# Patient Record
Sex: FEMALE
[De-identification: names, ages, dates, MRNs, and addresses within clinical notes are randomized per-mention and may not be internally consistent; named-entity substitution may affect disease eponyms.]

---

## 2018-08-16 ENCOUNTER — HOSPITAL ENCOUNTER (INPATIENT)
Dept: HOSPITAL 97 - ER | Age: 83
LOS: 5 days | Discharge: HOME HEALTH | DRG: 291 | End: 2018-08-21
Attending: FAMILY MEDICINE | Admitting: HOSPITALIST
Payer: COMMERCIAL

## 2018-08-16 DIAGNOSIS — D64.9: ICD-10-CM

## 2018-08-16 DIAGNOSIS — I25.810: ICD-10-CM

## 2018-08-16 DIAGNOSIS — Z16.12: ICD-10-CM

## 2018-08-16 DIAGNOSIS — I50.31: ICD-10-CM

## 2018-08-16 DIAGNOSIS — Z87.891: ICD-10-CM

## 2018-08-16 DIAGNOSIS — N30.00: ICD-10-CM

## 2018-08-16 DIAGNOSIS — R07.89: ICD-10-CM

## 2018-08-16 DIAGNOSIS — E11.22: ICD-10-CM

## 2018-08-16 DIAGNOSIS — B96.20: ICD-10-CM

## 2018-08-16 DIAGNOSIS — I25.10: ICD-10-CM

## 2018-08-16 DIAGNOSIS — Z79.4: ICD-10-CM

## 2018-08-16 DIAGNOSIS — Z95.1: ICD-10-CM

## 2018-08-16 DIAGNOSIS — N17.9: ICD-10-CM

## 2018-08-16 DIAGNOSIS — E11.40: ICD-10-CM

## 2018-08-16 DIAGNOSIS — N34.2: ICD-10-CM

## 2018-08-16 DIAGNOSIS — N18.3: ICD-10-CM

## 2018-08-16 DIAGNOSIS — I13.0: Primary | ICD-10-CM

## 2018-08-16 DIAGNOSIS — Z79.82: ICD-10-CM

## 2018-08-16 DIAGNOSIS — E78.2: ICD-10-CM

## 2018-08-16 LAB
ALBUMIN SERPL BCP-MCNC: 3.8 G/DL (ref 3.4–5)
ALP SERPL-CCNC: 72 U/L (ref 45–117)
ALT SERPL W P-5'-P-CCNC: 26 U/L (ref 12–78)
AST SERPL W P-5'-P-CCNC: 23 U/L (ref 15–37)
BUN BLD-MCNC: 38 MG/DL (ref 7–18)
CKMB CREATINE KINASE MB: 2.1 NG/ML (ref 0.3–3.6)
GLUCOSE SERPLBLD-MCNC: 262 MG/DL (ref 74–106)
HCT VFR BLD CALC: 30 % (ref 36–45)
INR BLD: 0.95
LIPASE SERPL-CCNC: 90 U/L (ref 73–393)
LYMPHOCYTES # SPEC AUTO: 1.7 K/UL (ref 0.7–4.9)
MAGNESIUM SERPL-MCNC: 2.2 MG/DL (ref 1.8–2.4)
MCH RBC QN AUTO: 32.9 PG (ref 27–35)
MCV RBC: 96 FL (ref 80–100)
NT-PROBNP SERPL-MCNC: 881 PG/ML (ref ?–450)
PMV BLD: 9.9 FL (ref 7.6–11.3)
POTASSIUM SERPL-SCNC: 4.7 MMOL/L (ref 3.5–5.1)
RBC # BLD: 3.12 M/UL (ref 3.86–4.86)

## 2018-08-16 PROCEDURE — 83970 ASSAY OF PARATHORMONE: CPT

## 2018-08-16 PROCEDURE — 93306 TTE W/DOPPLER COMPLETE: CPT

## 2018-08-16 PROCEDURE — 36415 COLL VENOUS BLD VENIPUNCTURE: CPT

## 2018-08-16 PROCEDURE — 82570 ASSAY OF URINE CREATININE: CPT

## 2018-08-16 PROCEDURE — 90670 PCV13 VACCINE IM: CPT

## 2018-08-16 PROCEDURE — 87186 SC STD MICRODIL/AGAR DIL: CPT

## 2018-08-16 PROCEDURE — 83690 ASSAY OF LIPASE: CPT

## 2018-08-16 PROCEDURE — 80048 BASIC METABOLIC PNL TOTAL CA: CPT

## 2018-08-16 PROCEDURE — 87077 CULTURE AEROBIC IDENTIFY: CPT

## 2018-08-16 PROCEDURE — 82553 CREATINE MB FRACTION: CPT

## 2018-08-16 PROCEDURE — 84550 ASSAY OF BLOOD/URIC ACID: CPT

## 2018-08-16 PROCEDURE — 82962 GLUCOSE BLOOD TEST: CPT

## 2018-08-16 PROCEDURE — 85730 THROMBOPLASTIN TIME PARTIAL: CPT

## 2018-08-16 PROCEDURE — 80061 LIPID PANEL: CPT

## 2018-08-16 PROCEDURE — 96372 THER/PROPH/DIAG INJ SC/IM: CPT

## 2018-08-16 PROCEDURE — 84100 ASSAY OF PHOSPHORUS: CPT

## 2018-08-16 PROCEDURE — 87086 URINE CULTURE/COLONY COUNT: CPT

## 2018-08-16 PROCEDURE — 81003 URINALYSIS AUTO W/O SCOPE: CPT

## 2018-08-16 PROCEDURE — 82043 UR ALBUMIN QUANTITATIVE: CPT

## 2018-08-16 PROCEDURE — 96365 THER/PROPH/DIAG IV INF INIT: CPT

## 2018-08-16 PROCEDURE — 84484 ASSAY OF TROPONIN QUANT: CPT

## 2018-08-16 PROCEDURE — 87088 URINE BACTERIA CULTURE: CPT

## 2018-08-16 PROCEDURE — 83880 ASSAY OF NATRIURETIC PEPTIDE: CPT

## 2018-08-16 PROCEDURE — 80069 RENAL FUNCTION PANEL: CPT

## 2018-08-16 PROCEDURE — 99285 EMERGENCY DEPT VISIT HI MDM: CPT

## 2018-08-16 PROCEDURE — 81015 MICROSCOPIC EXAM OF URINE: CPT

## 2018-08-16 PROCEDURE — 83735 ASSAY OF MAGNESIUM: CPT

## 2018-08-16 PROCEDURE — 84156 ASSAY OF PROTEIN URINE: CPT

## 2018-08-16 PROCEDURE — 82550 ASSAY OF CK (CPK): CPT

## 2018-08-16 PROCEDURE — 93005 ELECTROCARDIOGRAM TRACING: CPT

## 2018-08-16 PROCEDURE — 96375 TX/PRO/DX INJ NEW DRUG ADDON: CPT

## 2018-08-16 PROCEDURE — 85025 COMPLETE CBC W/AUTO DIFF WBC: CPT

## 2018-08-16 PROCEDURE — 80076 HEPATIC FUNCTION PANEL: CPT

## 2018-08-16 PROCEDURE — 71045 X-RAY EXAM CHEST 1 VIEW: CPT

## 2018-08-16 PROCEDURE — 82040 ASSAY OF SERUM ALBUMIN: CPT

## 2018-08-16 PROCEDURE — 85610 PROTHROMBIN TIME: CPT

## 2018-08-16 RX ADMIN — METOPROLOL TARTRATE SCH: 50 TABLET, FILM COATED ORAL at 21:00

## 2018-08-16 NOTE — XMS REPORT
[facility]

 Created on:May 8, 2018



Patient:Cecilia Lancaster

Sex:Female

:1935

External Reference #:[id number]





Demographics







 Address  [address] [place] [id number]

 

 Phone  [phone]

 

 Preferred Language  es

 

 Marital Status  Unknown

 

 Pentec[employer]al Affiliation  Unknown

 

 Race  

 

 Ethnic Group   or 









Author







 Organization  [employer]









Care Team Providers







 Name  Role  Phone

 

 Lopez, Na  Provider Role  Unavailable









Allergies

No Known Allergies



Problems







 Problem Type  Condition  Code  Onset Dates  Condition Status

 

 Problem  CKD (chronic kidney disease) stage  N18.3    Active



   3, GFR 30-59 ml/min      

 

 Problem  Arthritis  M19.90    Active

 

 Problem  Thrombocytopenia  D69.6    Active

 

 Problem  Primary osteoarthritis of both hips  M16.0    Active

 

 Problem  Type 2 diabetes mellitus without  E11.9    Active



   complications      

 

 Problem  Diabetes  E11.9    Active

 

 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



   conditions      

 

 Problem  Decreased hearing, unspecified  H91.90    Active



   laterality      

 

 Problem  GERD (gastroesophageal reflux  K21.9    Active



   disease)      

 

 Problem  Osteoporosis  M81.0    Active

 

 Problem  Vitamin B12 deficiency  E53.8    Active

 

 Problem  Benign essential HTN  I10    Active

 

 Problem  Trigger finger  M65.30    Active

 

 Problem  Arteriosclerosis of coronary artery  I25.10    Active

 

 Problem  DDD (degenerative disc disease),  M51.37    Active



   lumbosacral      

 

 Problem  DJD (degenerative joint disease),  M15.9    Active



   multiple sites      

 

 Problem  Diverticulosis of colon  K57.30    Active

 

 Problem  Long term current use of insulin  Z79.4    Active

 

 Problem  Hyperlipidemia, mixed  E78.2    Active

 

 Problem  Chronic renal disease  N18.9    Active

 

 Problem  Anemia in chronic illness  D63.8    Active

 

 Problem  Depression with anxiety  F41.8    Active







Medications







 Medication  Code System  Code  Instructions  Start Date  End Date  Status  
Dosage

 

 Levemir  NDC  [id number]  100 UNIT/ML  May 08,    Active  50 units



       Subcutaneous once  2018      



       daily        







Results

No Known Results



Summary Purpose

[employer] Submission

## 2018-08-16 NOTE — XMS REPORT
[facility]

 Created on:2018



Patient:Cecilia Lancaster

Sex:Female

:1935

External Reference #:[id number]





Demographics







 Address  [address] [place] [address]

 

 Phone  [phone]

 

 Preferred Language  es

 

 Marital Status  Unknown

 

 [employer] Affiliation  Unknown

 

 Race  

 

 Ethnic Group  Unknown









Author







 Organization  [employer]









Care Team Providers







 Name  Role  Phone

 

 Lopez, Na  Provider Role  Unavailable









Allergies

No Known Allergies



Problems







 Problem Type  Condition  Code  Onset Dates  Condition Status

 

 Problem  Benign essential HTN  I10    Active

 

 Problem  Vitamin B12 deficiency  E53.8    Active

 

 Problem  Hyperlipidemia, mixed  E78.2    Active

 

 Problem  Diabetes  E11.9    Active

 

 Problem  Anemia in chronic illness  D63.8    Active

 

 Problem  Osteoporosis  M81.0    Active

 

 Problem  Diverticulosis of colon  K57.30    Active

 

 Problem  Chronic renal disease  N18.9    Active

 

 Problem  Long term current use of insulin  Z79.4    Active

 

 Problem  Posterior auricular lymphadenopathy  R59.0    Active

 

 Problem  Hyperglycemia  R73.9    Active

 

 Problem  CKD (chronic kidney disease) stage  N18.3    Active



   3, GFR 30-59 ml/min      

 

 Problem  Depression with anxiety  F41.8    Active

 

 Problem  Urinary tract infection without  N39.0    Active



   hematuria, site unspecified      

 

 Problem  GERD (gastroesophageal reflux  K21.9    Active



   disease)      

 

 Problem  Decreased hearing, unspecified  H91.90    Active



   laterality      

 

 Problem  Primary osteoarthritis of both hips  M16.0    Active

 

 Problem  Fatty liver  K76.0    Active

 

 Problem  Right upper quadrant abdominal pain  R10.11    Active

 

 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



   conditions      

 

 Problem  Type 2 diabetes mellitus without  E11.9    Active



   complications      

 

 Problem  Thrombocytopenia  D69.6    Active

 

 Problem  Arthritis  M19.90    Active

 

 Problem  Trigger finger  M65.30    Active

 

 Problem  Arteriosclerosis of coronary artery  I25.10    Active

 

 Problem  DDD (degenerative disc disease),  M51.37    Active



   lumbosacral      

 

 Problem  DJD (degenerative joint disease),  M15.9    Active



   multiple sites      







Medications







 Medication  Code System  Code  Instructions  Start Date  End Date  Status  
Dosage

 

 Cipro  NDC  [id number]  500 MG Orally  ,  ,  Active  1 tablet



       every 12 hrs  2018    







Results

No Known Results



Summary Purpose

[employer] Submission

## 2018-08-16 NOTE — ER
Nurse's Notes                                                                                     

 [facility]                                                                

Name: Cecilia Lancaster                                                                                  

Age: 82 yrs                                                                                       

Sex: Female                                                                                       

: 1935                                                                                   

MRN: [id number]                                                                                   

Arrival Date: 2018                                                                          

Time: 18:43                                                                                       

Account#: [id number]                                                                            

Bed 4                                                                                             

Private MD:                                                                                       

Diagnosis: Other chest pain;Dyspnea;Essential (primary) hypertension;Type 1 diabetes              

  mellitus;Cystitis;Unspecified combined systolic (congestive) and diastolic                      

  (congestive) heart failure                                                                      

                                                                                                  

Presentation:                                                                                     

                                                                                             

18:44 Presenting complaint: EMS states: Chest pain radiating to left arm since 1730. ASA 81mg la1 

      x4 given PTA. Transition of care: patient was not received from another setting of          

      care. Onset of symptoms was 2018. Risk Assessment: Do you want to hurt           

      yourself or someone else? Patient reports no desire to harm self or others. Initial         

      Sepsis Screen: Does the patient meet any 2 criteria? No. Patient's initial sepsis           

      screen is negative. Does the patient have a suspected source of infection? No.              

      Patient's initial sepsis screen is negative. Care prior to arrival: Medication(s)           

      given: ASA, 81 mg, x 4.                                                                     

18:44 Method Of Arrival: EMS: [facility]                                                    la1 

18:44 Acuity: RICK 2                                                                           la1 

                                                                                                  

Historical:                                                                                       

- Allergies:                                                                                      

18:46 NKA;                                                                                    la1 

- PMHx:                                                                                           

18:46 Diabetes - IDDM; Hyperlipidemia; Hypertension;                                          la1 

                                                                                                  

- Immunization history:: Adult Immunizations up to date.                                          

- Social history:: Smoking status: Patient/guardian denies using tobacco.                         

- Ebola Screening: : No symptoms or risks identified at this time.                                

                                                                                                  

                                                                                                  

Screenin:50 Abuse screen: Denies threats or abuse. Nutritional screening: No deficits noted.        la1 

      Tuberculosis screening: No symptoms or risk factors identified. Fall Risk None              

      identified.                                                                                 

                                                                                                  

Assessment:                                                                                       

19:30 General: Appears in no apparent distress. Behavior is calm, cooperative, appropriate    lp1 

      for age. Pain: Complains of pain in chest Pain does not radiate. Pain began 2 hours         

      ago. Neuro: Level of Consciousness is awake, alert, obeys commands, Oriented to person,     

      place, time, situation. Cardiovascular: Patient's skin is warm and dry. Rhythm is sinus     

      rhythm. Respiratory: Respiratory effort is even, unlabored, Respiratory pattern is          

      regular, Breath sounds are clear bilaterally. GI: Abdomen is non-distended. : No          

      signs and/or symptoms were reported regarding the genitourinary system. EENT: No signs      

      and/or symptoms were reported regarding the EENT system. Derm: Skin is pink, warm \T\       

      dry. Musculoskeletal: Circulation, motion, and sensation intact.                            

20:30 Reassessment: Patient appears in no apparent distress at this time. No changes from     lp1 

      previously documented assessment. Patient and/or family updated on plan of care and         

      expected duration. Pain level reassessed.                                                   

21:30 Reassessment: Patient appears in no apparent distress at this time. Patient is alert,   lp1 

      oriented x 3, equal unlabored respirations, skin warm/dry/pink. Patient aware of admit,     

      family at bedside.                                                                          

                                                                                                  

Vital Signs:                                                                                      

18:47  / 61; Pulse 65; Resp 16; Pulse Ox 98% on R/A;                                    la1 

18:50 Temp 98.4(TE); Weight 79.38 kg (R);                                                     la1 

19:45  / 57; Pulse 61; Resp 17; Pulse Ox 99% on R/A;                                    lp1 

20:30  / 62; Pulse 60; Resp 14; Pulse Ox 99% on R/A;                                    lp1 

21:30  / 61; Pulse 58; Resp 16; Pulse Ox 99% on R/A; Pain 0/10;                         lp1 

                                                                                                  

ED Course:                                                                                        

18:43 Patient arrived in ED.                                                                  la1 

18:43 EKG done, by ED staff, reviewed by Johnathan Ireland MD.                                    eb  

18:45 Triage completed.                                                                       la1 

18:47 Arm band placed on left wrist. EKG completed in triage. Results shown to MD.            la1 

18:51 Placed in gown. Bed in low position. Call light in reach. Cardiac monitor on. Pulse ox  la1 

      on. NIBP on.                                                                                

18:51 Patient maintains SpO2 saturation greater than 95% on room air.                         la1 

19:20 Jason Acosta MD is Attending Physician.                                             desire 

19:20 Inserted saline lock: 22 gauge in right wrist, using aseptic technique. Blood collected.cc  

19:20 Initial lab(s) drawn, by me, sent to lab.                                               cc  

19:42 Manisha Valente, RN is Primary Nurse.                                                       lp1 

19:45 XRAY Chest (1 view) In Process Unspecified.                                             EDMS

19:49 Stephanie Rosas MD is Hospitalizing Provider.                                           desire 

22:17 No provider procedures requiring assistance completed. Patient admitted, IV remains in  lp1 

      place.                                                                                      

                                                                                                  

Administered Medications:                                                                         

19:55 CANCELLED (Duplicate Order): NS 0.9% 1000 ml IV at 75 ml/hr continuous                  desire 

19:58 Not Given (Given by EMS): Aspirin Chewable Tablet 162 mg PO once                        lp1 

21:45 Drug: Lovenox 1 mg/kg Route: Sub-Q; Site: right lower abdomen;                          lp1 

22:18 Follow up: Response: No adverse reaction                                                lp1 

21:45 Drug: Lasix 20 mg Route: IVP; Site: right wrist;                                        lp1 

22:19 Follow up: Response: No adverse reaction                                                lp1 

21:45 Drug: Rocephin - (cefTRIAXone) 1 grams Route: IVPB; Infused Over: 30 mins; Site: right  lp1 

      wrist;                                                                                      

22:19 Follow up: Response: No adverse reaction; IV Status: Completed infusion; IV Intake: 99atdd1 

22:06 Not Given (HR 57): Lopressor 25 mg PO once                                              lp1 

                                                                                                  

                                                                                                  

Intake:                                                                                           

22:19 IV: 10ml; Total: 10ml.                                                                  lp1 

                                                                                                  

Outcome:                                                                                          

19:51 Decision to Hospitalize by Provider.                                                    desire 

22:17 Admitted to Med/surg via wheelchair, room 220, with chart, Report called to  spike Quiñones 

      RN                                                                                          

22:17 Condition: stable                                                                           

22:17 Instructed on the need for admit.                                                           

22:28 Patient left the ED.                                                                    lp1 

                                                                                                  

Signatures:                                                                                       

Dispatcher [employer]                           EDJason Gomes MD MD cha Christian, Chelsea cc Pena, Laura, RN RN   lp1                                                  

Osbaldo Wood RN RN   [facility]                                                  

Taty Stovall                                                   

                                                                                                  

**************************************************************************************************

## 2018-08-16 NOTE — RAD REPORT
EXAM DESCRIPTION:  RAD - Chest Single View - 8/16/2018 7:44 pm

 

CLINICAL HISTORY:  CHEST PAIN

Chest pain.

 

COMPARISON:  Chest Pa And Lat (2 Views) dated 9/7/2017; CHEST SINGLE VIEW dated 3/10/2016; CHEST PA A
ND LAT 2 VIEW dated 1/13/2014; CHEST SINGLE VIEW dated 9/30/2013

 

FINDINGS:  Portable technique limits examination quality.

 

Mild interstitial pulmonary edema is present. The heart is mildly enlarged in size with sternotomy wi
res present. No displaced fractures.

 

IMPRESSION:  Mild CHF/ volume overload pattern.

## 2018-08-16 NOTE — XMS REPORT
[facility]

 Created on:2018



Patient:Cecilia Lancaster

Sex:Female

:1935

External Reference #:[id number]





Demographics







 Address  [address] [place] [address]

 

 Phone  [phone]

 

 Preferred Language  es

 

 Marital Status  Unknown

 

 Pent[employer]al Affiliation  Unknown

 

 Race  

 

 Ethnic Group  Unknown









Author







 Organization  [employer]









Care Team Providers







 Name  Role  Phone

 

 Lopez, Na  Provider Role  Unavailable









Allergies

No Known Allergies



Problems







 Problem Type  Condition  Code  Onset Dates  Condition Status

 

 Problem  Benign essential HTN  I10    Active

 

 Problem  Vitamin B12 deficiency  E53.8    Active

 

 Problem  Hyperlipidemia, mixed  E78.2    Active

 

 Problem  Diabetes  E11.9    Active

 

 Problem  Anemia in chronic illness  D63.8    Active

 

 Problem  Osteoporosis  M81.0    Active

 

 Problem  Diverticulosis of colon  K57.30    Active

 

 Problem  Chronic renal disease  N18.9    Active

 

 Problem  Long term current use of insulin  Z79.4    Active

 

 Problem  Posterior auricular lymphadenopathy  R59.0    Active

 

 Problem  Hyperglycemia  R73.9    Active

 

 Problem  CKD (chronic kidney disease) stage  N18.3    Active



   3, GFR 30-59 ml/min      

 

 Problem  Depression with anxiety  F41.8    Active

 

 Problem  Urinary tract infection without  N39.0    Active



   hematuria, site unspecified      

 

 Problem  GERD (gastroesophageal reflux  K21.9    Active



   disease)      

 

 Problem  Decreased hearing, unspecified  H91.90    Active



   laterality      

 

 Problem  Primary osteoarthritis of both hips  M16.0    Active

 

 Problem  Fatty liver  K76.0    Active

 

 Problem  Right upper quadrant abdominal pain  R10.11    Active

 

 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



   conditions      

 

 Problem  Type 2 diabetes mellitus without  E11.9    Active



   complications      

 

 Problem  Thrombocytopenia  D69.6    Active

 

 Problem  Arthritis  M19.90    Active

 

 Problem  Trigger finger  M65.30    Active

 

 Problem  Arteriosclerosis of coronary artery  I25.10    Active

 

 Problem  DDD (degenerative disc disease),  M51.37    Active



   lumbosacral      

 

 Problem  DJD (degenerative joint disease),  M15.9    Active



   multiple sites      







Medications

No Known Medications



Results

No Known Results



Summary Purpose

eClinicalWorks Submission

## 2018-08-16 NOTE — EDPHYS
Physician Documentation                                                                           

 [facility]                                                                

Name: Cecilia Lancaster                                                                                  

Age: 82 yrs                                                                                       

Sex: Female                                                                                       

: 1935                                                                                   

MRN: [id number]                                                                                   

Arrival Date: 2018                                                                          

Time: 18:43                                                                                       

Account#: [id number]                                                                            

Bed 4                                                                                             

Private MD:                                                                                       

ED Physician Jason Acosta                                                                      

HPI:                                                                                              

                                                                                             

19:44 This 82 yrs old  Female presents to ER via EMS with complaints of Chest Pain >  desire 

      29 y/o.                                                                                     

19:44 The patient or guardian reports chest pain that is located primarily in the substernal  desire 

      area. Onset: 3 day(s) ago. The pain does not radiate. Associated signs and symptoms:        

      The patient has no apparent associated signs or symptoms. The chest pain is described       

      as a pressure. Duration: The patient or guardian reports multiple episodes, with no         

      pattern. Severity of pain: At its worst the pain was mild in the emergency department       

      the pain has resolved. The patient has experienced similar episodes in the past,            

      several times.                                                                              

                                                                                                  

Historical:                                                                                       

- Allergies:                                                                                      

18:46 NKA;                                                                                    la1 

- PMHx:                                                                                           

18:46 Diabetes - IDDM; Hyperlipidemia; Hypertension;                                          la1 

                                                                                                  

- Immunization history:: Adult Immunizations up to date.                                          

- Social history:: Smoking status: Patient/guardian denies using tobacco.                         

- Ebola Screening: : No symptoms or risks identified at this time.                                

                                                                                                  

                                                                                                  

ROS:                                                                                              

19:47 Constitutional: Negative for fever, chills, and weight loss, Eyes: Negative for injury, desire 

      pain, redness, and discharge, ENT: Negative for injury, pain, and discharge, Neck:          

      Negative for injury, pain, and swelling, Abdomen/GI: Negative for abdominal pain,           

      nausea, vomiting, diarrhea, and constipation, Back: Negative for injury and pain, :       

      Negative for injury, bleeding, discharge, and swelling, MS/Extremity: Negative for          

      injury and deformity, Skin: Negative for injury, rash, and discoloration, Neuro:            

      Negative for headache, weakness, numbness, tingling, and seizure, Psych: Negative for       

      depression, anxiety, suicide ideation, homicidal ideation, and hallucinations,              

      Allergy/Immunology: Negative for hives, rash, and allergies, Endocrine: Negative for        

      neck swelling, polydipsia, polyuria, polyphagia, and marked weight changes,                 

      Hematologic/Lymphatic: Negative for swollen nodes, abnormal bleeding, and unusual           

      bruising.                                                                                   

19:47 Cardiovascular: Positive for chest pain, with cough.                                        

19:47 Respiratory: Positive for shortness of breath, at rest.                                     

                                                                                                  

Exam:                                                                                             

19:47 Constitutional:  This is a well developed, well nourished patient who is awake, alert,  desire 

      and in no acute distress. Head/Face:  Normocephalic, atraumatic. Eyes:  Pupils equal        

      round and reactive to light, extra-ocular motions intact.  Lids and lashes normal.          

      Conjunctiva and sclera are non-icteric and not injected.  Cornea within normal limits.      

      Periorbital areas with no swelling, redness, or edema. ENT:  Nares patent. No nasal         

      discharge, no septal abnormalities noted.  Tympanic membranes are normal and external       

      auditory canals are clear.  Oropharynx with no redness, swelling, or masses, exudates,      

      or evidence of obstruction, uvula midline.  Mucous membranes moist. Neck:  Trachea          

      midline, no thyromegaly or masses palpated, and no cervical lymphadenopathy.  Supple,       

      full range of motion without nuchal rigidity, or vertebral point tenderness.  No            

      Meningismus. Chest/axilla:  Normal chest wall appearance and motion.  Nontender with no     

      deformity.  No lesions are appreciated. Cardiovascular:  Regular rate and rhythm with a     

      normal S1 and S2.  No gallops, murmurs, or rubs.  Normal PMI, no JVD.  No pulse             

      deficits. Respiratory:  Lungs have equal breath sounds bilaterally, clear to                

      auscultation and percussion.  No rales, rhonchi or wheezes noted.  No increased work of     

      breathing, no retractions or nasal flaring. Abdomen/GI:  Soft, non-tender, with normal      

      bowel sounds.  No distension or tympany.  No guarding or rebound.  No evidence of           

      tenderness throughout. Back:  No spinal tenderness.  No costovertebral tenderness.          

      Full range of motion. Skin:  Warm, dry with normal turgor.  Normal color with no            

      rashes, no lesions, and no evidence of cellulitis. MS/ Extremity:  Pulses equal, no         

      cyanosis.  Neurovascular intact.  Full, normal range of motion. Neuro:  Awake and           

      alert, GCS 15, oriented to person, place, time, and situation.  Cranial nerves II-XII       

      grossly intact.  Motor strength 5/5 in all extremities.  Sensory grossly intact.            

      Cerebellar exam normal.  Normal gait. Psych:  Awake, alert, with orientation to person,     

      place and time.  Behavior, mood, and affect are within normal limits.                       

                                                                                                  

Vital Signs:                                                                                      

18:47  / 61; Pulse 65; Resp 16; Pulse Ox 98% on R/A;                                    la1 

18:50 Temp 98.4(TE); Weight 79.38 kg (R);                                                     la1 

19:45  / 57; Pulse 61; Resp 17; Pulse Ox 99% on R/A;                                    lp1 

20:30  / 62; Pulse 60; Resp 14; Pulse Ox 99% on R/A;                                    lp1 

21:30  / 61; Pulse 58; Resp 16; Pulse Ox 99% on R/A; Pain 0/10;                         lp1 

                                                                                                  

MDM:                                                                                              

19:20 Patient medically screened.                                                             [facility] 

19:56 Data reviewed: vital signs, nurses notes, lab test result(s), EKG, radiologic studies,  desire 

      plain films.                                                                                

                                                                                                  

                                                                                             

18:52 Order name: Basic Metabolic Panel; Complete Time: 19:54                                 [facility] 

                                                                                             

18:52 Order name: CBC with Diff; Complete Time: 19:54                                         [facility] 

                                                                                             

18:52 Order name: Ckmb; Complete Time: 19:54                                                  [facility] 

                                                                                             

18:52 Order name: CPK; Complete Time: 19:54                                                   [facility] 

                                                                                             

18:52 Order name: LFT's; Complete Time: 19:54                                                 [facility] 

                                                                                             

18:52 Order name: Magnesium; Complete Time: 19:54                                             la                                                                                             

18:52 Order name: NT PRO-BNP; Complete Time: 19:54                                            [facility] 

                                                                                             

18:52 Order name: PT-INR; Complete Time: 19:54                                                [facility] 

                                                                                             

18:52 Order name: Ptt, Activated; Complete Time: 19:54                                        [facility] 

                                                                                             

18:52 Order name: Troponin (emerg Dept Use Only); Complete Time: 19:54                        [facility] 

                                                                                             

18:52 Order name: XRAY Chest (1 view); Complete Time: 19:54                                   [facility] 

                                                                                             

18:52 Order name: Lipase; Complete Time: 19:54                                                la                                                                                             

19:32 Order name: Urine Dipstick--Ancillary (enter results); Complete Time: 21:07             ms  

                                                                                             

19:56 Order name: Urine Culture                                                               [facility] 

                                                                                             

18:52 Order name: EKG; Complete Time: 18:53                                                   la                                                                                             

18:52 Order name: Cardiac monitoring; Complete Time: 18:54                                    la 

16                                                                                             

18:52 Order name: EKG - Nurse/Tech; Complete Time: 19:32                                                                                                                                   

18:52 Order name: IV Saline Lock; Complete Time: 19:32                                                                                                                                     

18:52 Order name: Labs collected and sent; Complete Time: 19:32                                                                                                                            

18:52 Order name: O2 Per Protocol; Complete Time: 19:32                                                                                                                                    

18:52 Order name: O2 Sat Monitoring; Complete Time: 19:32                                                                                                                                  

18:52 Order name: Urine Dipstick-Ancillary (obtain specimen); Complete Time: 19:32                                                                                                         

20:02 Order name: CONS Physician Consult                                                      EDMS

                                                                                                  

Administered Medications:                                                                         

19:55 CANCELLED (Duplicate Order): NS 0.9% 1000 ml IV at 75 ml/hr continuous                  desire 

19:58 Not Given (Given by EMS): Aspirin Chewable Tablet 162 mg PO once                        lp1 

21:45 Drug: Lovenox 1 mg/kg Route: Sub-Q; Site: right lower abdomen;                          lp1 

22:18 Follow up: Response: No adverse reaction                                                lp1 

21:45 Drug: Lasix 20 mg Route: IVP; Site: right wrist;                                        lp1 

22:19 Follow up: Response: No adverse reaction                                                lp1 

21:45 Drug: Rocephin - (cefTRIAXone) 1 grams Route: IVPB; Infused Over: 30 mins; Site: right  lp1 

      wrist;                                                                                      

22:19 Follow up: Response: No adverse reaction; IV Status: Completed infusion; IV Intake: [id number] 

22:06 Not Given (HR 57): Lopressor 25 mg PO once                                              lp1 

                                                                                                  

                                                                                                  

Disposition:                                                                                      

18 19:51 Hospitalization ordered by Stephanie Rosas for Observation. Preliminary             

  diagnosis are Other chest pain, Dyspnea, Essential (primary) hypertension,                      

  Type 1 diabetes mellitus, Cystitis, Unspecified combined systolic                               

  (congestive) and diastolic (congestive) heart failure.                                          

- Bed requested for Telemetry/MedSurg (observation).                                              

- Status is Observation.                                                                      lp1 

- Condition is Stable.                                                                            

- Problem is new.                                                                                 

- Symptoms have improved.                                                                         

UTI on Admission? Yes                                                                             

                                                                                                  

                                                                                                  

                                                                                                  

Signatures:                                                                                       

Dispatcher [employer]                           EDMS                                                 

Nitza Ramírez RN RN mw Anderson, Corey, MD MD cha Pena, Laura, RN RN   lp1                                                  

Attema, Osbaldo, RN                         RN   la1                                                  

                                                                                                  

Corrections: (The following items were deleted from the chart)                                    

19:51 19:51 Hospitalization Ordered by Stephanie Rosas MD for Observation. Preliminary          desire 

      diagnosis is Other chest pain; Dyspnea; Essential (primary) hypertension; Type 1            

      diabetes mellitus. Bed requested for Telemetry/MedSurg (observation). Status is             

      Observation. Condition is Stable. Problem is new. Symptoms have improved. UTI on            

      Admission? No. desire                                                                          

19:55 19:21 NS 0.9% 1000 ml IV at 75 ml/hr continuous ordered. [facility] 

19:56 19:51 2018 19:51 Hospitalization Ordered by Stephanie Rosas MD for Observation.     desire 

      Preliminary diagnosis is Other chest pain; Dyspnea; Essential (primary) hypertension;       

      Type 1 diabetes mellitus; Cystitis. Bed requested for Telemetry/MedSurg (observation).      

      Status is Observation. Condition is Stable. Problem is new. Symptoms have improved. UTI     

      on Admission? Yes. [facility]                                                                      

20:04 19:56 2018 19:51 Hospitalization Ordered by Stephanie Rosas MD for Observation.       

      Preliminary diagnosis is Other chest pain; Dyspnea; Essential (primary) hypertension;       

      Type 1 diabetes mellitus; Cystitis; Unspecified combined systolic (congestive) and          

      diastolic (congestive) heart failure. Bed requested for Telemetry/MedSurg                   

      (observation). Status is Observation. Condition is Stable. Problem is new. Symptoms         

      have improved. UTI on Admission? Yes. [facility]                                                   

22:28 20:04 2018 19:51 Hospitalization Ordered by Stephanie Rosas MD for Observation.     lp1 

      Preliminary diagnosis is Other chest pain; Dyspnea; Essential (primary) hypertension;       

      Type 1 diabetes mellitus; Cystitis; Unspecified combined systolic (congestive) and          

      diastolic (congestive) heart failure. Bed requested for Telemetry/MedSurg                   

      (observation). Status is Observation. Condition is Stable. Problem is new. Symptoms         

      have improved. UTI on Admission? Yes. mw                                                    

                                                                                                  

**************************************************************************************************

## 2018-08-16 NOTE — XMS REPORT
[facility]

 Created on:2018



Patient:Cecilia Lancaster

Sex:Female

:1935

External Reference #:[id number]





Demographics







 Address  [address] [place] [id number]

 

 Phone  [phone]

 

 Preferred Language  es

 

 Marital Status  Unknown

 

 Rastafarian Affiliation  Unknown

 

 Race  

 

 Ethnic Group  Unknown









Author







 Organization  [employer]









Care Team Providers







 Name  Role  Phone

 

 Lopez, Na  Provider Role  Unavailable









Allergies, Adverse Reactions, Alerts







 Substance  Reaction  Event Type

 

 N.K.D.A.  Info Not Available  Non Drug Allergy







Problems







 Problem Type  Condition  Code  Onset Dates  Condition Status

 

 Assessment  Fatty liver  K76.0    Active

 

 Assessment  Urinary tract infection without  N39.0    Active



   hematuria, site unspecified      

 

 Assessment  Long term current use of insulin  Z79.4    Active

 

 Assessment  CKD (chronic kidney disease) stage  N18.3    Active



   3, GFR 30-59 ml/min      

 

 Assessment  Hyperglycemia  R73.9    Active

 

 Assessment  Type 2 diabetes mellitus without  E11.9    Active



   complications      

 

 Problem  Benign essential HTN  I10    Active

 

 Problem  Vitamin B12 deficiency  E53.8    Active

 

 Problem  Hyperlipidemia, mixed  E78.2    Active

 

 Problem  Diabetes  E11.9    Active

 

 Problem  Anemia in chronic illness  D63.8    Active

 

 Problem  Osteoporosis  M81.0    Active

 

 Problem  Diverticulosis of colon  K57.30    Active

 

 Problem  Chronic renal disease  N18.9    Active

 

 Problem  Long term current use of insulin  Z79.4    Active

 

 Problem  Posterior auricular lymphadenopathy  R59.0    Active

 

 Problem  Hyperglycemia  R73.9    Active

 

 Problem  CKD (chronic kidney disease) stage  N18.3    Active



   3, GFR 30-59 ml/min      

 

 Problem  Depression with anxiety  F41.8    Active

 

 Problem  Urinary tract infection without  N39.0    Active



   hematuria, site unspecified      

 

 Problem  GERD (gastroesophageal reflux  K21.9    Active



   disease)      

 

 Problem  Decreased hearing, unspecified  H91.90    Active



   laterality      

 

 Problem  Primary osteoarthritis of both hips  M16.0    Active

 

 Problem  Fatty liver  K76.0    Active

 

 Problem  Right upper quadrant abdominal pain  R10.11    Active

 

 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



   conditions      

 

 Problem  Type 2 diabetes mellitus without  E11.9    Active



   complications      

 

 Problem  Thrombocytopenia  D69.6    Active

 

 Problem  Arthritis  M19.90    Active

 

 Problem  Trigger finger  M65.30    Active

 

 Problem  Arteriosclerosis of coronary artery  I25.10    Active

 

 Problem  DDD (degenerative disc disease),  M51.37    Active



   lumbosacral      

 

 Problem  DJD (degenerative joint disease),  M15.9    Active



   multiple sites      







Medications







 Medication  Code  Code  Instructions  Start  End  Status  Dosage



   System      Date  Date    

 

 Aspir-81  NDC  [id number]  81 MG Orally      Active  1 tablet



       Once a day        

 

 Metoprolol  NDC  [id number]  25 MG Orally      Active  1 tablet



 Succinate ER      Once a day        

 

 NovoFine Plus  NDC  [id number]  32G X 4 MM SC  ,    Active  as directed



       twice daily  2018      

 

 Zocor  NDC  [id number]  80 MG      Active  TOME CARLOS



               TABLETA



               TODOS LOS



               PADRON

 

 NovoLog Flexpen  ND  [id number]  100 UNIT/ML  ,    Active  as directed



       Subcutaneous 5        



       units before        



       lunch and        



       dinner        

 

 Cozaar  NDC  [id number]  25 MG Orally      Active  1 tablet



       Once a day        

 

 Tradjenta  NDC  [id number]  5 MG      Active  TOME CAROLS



               TABLETA



               TODOS LOS



               PADRON

 

 Toprol XL  NDC  [id number]  25 MG      Active  TAKE 1



               TABLET BY



               MOUTH DAILY

 

 Citalopram  NDC  [id number]  10 MG      Active  TOME CARLOS



 Hydrobromide              TABLETA POR



               VIA ORAL



               ONCE A DAY

 

 Zantac  ND  [id number]  150 MG      Active  TOME CARLOS



               TABLETA POR



               VIA ORAL



               DOS VECES



               AL JAN

 

 Simvastatin  NDC  [id number]  80 MG Orally      Active  1 tablet in



       Once a day        the evening

 

 Levemir  ND  [id number]  100 UNIT/ML  May 08,    Active  50 units



       Subcutaneous  2018      



       once daily        

 

 Flonase  NDC  [id number]  50 MCG/ACT      Active  1 spray in



       Nasally Once a        each



       day        nostril

 

 Omeprazole  NDC  [id number]  40 MG Orally      Active  1 capsule



       Once a day        

 

 Promethazine HCl  NDC  [id number]  25 MG Orally      Active  1 tablet as



       every 6 hrs        needed

 

 Macrobid  NDC  [id number]  100 MG Orally  ,    Active  1 capsule



       every 12 hrs  2018      with food







Results

No Known Results



Summary Purpose

[employer]ClinicalWorks Submission

## 2018-08-16 NOTE — XMS REPORT
[facility]

 Created on:2018



Patient:Cecilia Lancaster

Sex:Female

:1935

External Reference #:[id number]





Demographics







 Address  [address] [place] [id number]

 

 Phone  [phone]

 

 Preferred Language  es

 

 Marital Status  Unknown

 

 Quaker Affiliation  Unknown

 

 Race  

 

 Ethnic Group  Unknown









Author







 Organization  [employer]









Care Team Providers







 Name  Role  Phone

 

 Lopez, Na  Provider Role  Unavailable









Allergies, Adverse Reactions, Alerts







 Substance  Reaction  Event Type

 

 N.K.D.A.  Info Not Available  Non Drug Allergy







Problems







 Problem Type  Condition  Code  Onset Dates  Condition Status

 

 Assessment  Right upper quadrant abdominal pain  R10.11    Active

 

 Assessment  Posterior auricular lymphadenopathy  R59.0    Active

 

 Assessment  Long term current use of insulin  Z79.4    Active

 

 Assessment  CKD (chronic kidney disease) stage  N18.3    Active



   3, GFR 30-59 ml/min      

 

 Assessment  Hyperglycemia  R73.9    Active

 

 Assessment  Type 2 diabetes mellitus without  E11.9    Active



   complications      

 

 Problem  Benign essential HTN  I10    Active

 

 Problem  Vitamin B12 deficiency  E53.8    Active

 

 Problem  Hyperlipidemia, mixed  E78.2    Active

 

 Problem  Diabetes  E11.9    Active

 

 Problem  Anemia in chronic illness  D63.8    Active

 

 Problem  Osteoporosis  M81.0    Active

 

 Problem  Diverticulosis of colon  K57.30    Active

 

 Problem  Chronic renal disease  N18.9    Active

 

 Problem  Long term current use of insulin  Z79.4    Active

 

 Problem  Posterior auricular lymphadenopathy  R59.0    Active

 

 Problem  Hyperglycemia  R73.9    Active

 

 Problem  CKD (chronic kidney disease) stage  N18.3    Active



   3, GFR 30-59 ml/min      

 

 Problem  Depression with anxiety  F41.8    Active

 

 Problem  Urinary tract infection without  N39.0    Active



   hematuria, site unspecified      

 

 Problem  GERD (gastroesophageal reflux  K21.9    Active



   disease)      

 

 Problem  Decreased hearing, unspecified  H91.90    Active



   laterality      

 

 Problem  Primary osteoarthritis of both hips  M16.0    Active

 

 Problem  Fatty liver  K76.0    Active

 

 Problem  Right upper quadrant abdominal pain  R10.11    Active

 

 Assessment  Fatty liver  K76.0    Active

 

 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



   conditions      

 

 Assessment  Primary osteoarthritis of both hips  M16.0    Active

 

 Problem  Type 2 diabetes mellitus without  E11.9    Active



   complications      

 

 Problem  Thrombocytopenia  D69.6    Active

 

 Problem  Arthritis  M19.90    Active

 

 Problem  Trigger finger  M65.30    Active

 

 Problem  Arteriosclerosis of coronary artery  I25.10    Active

 

 Problem  DDD (degenerative disc disease),  M51.37    Active



   lumbosacral      

 

 Problem  DJD (degenerative joint disease),  M15.9    Active



   multiple sites      







Medications







 Medication  Code  Code  Instructions  Start  End  Status  Dosage



   System      Date  Date    

 

 Tradjenta  NDC  [id number]  5 MG      Active  TOME CARLOS



               TABLETA



               TODOS LOS



               PADRON

 

 Zantac  NDC  [id number]  150 MG      Active  TOME CARLOS



               TABLETA



               POR VIA



               ORAL DOS



               VECES AL



               JAN

 

 Metoprolol  NDC  [id number]  25 MG Orally      Active  1 tablet



 Succinate ER      Once a day        

 

 Flonase  NDC  [id number]  50 MCG/ACT      Active  1 spray in



       Nasally Once a        each



       day        nostril

 

 Toprol XL  NDC  [id number]  25 MG      Active  TAKE 1



               TABLET BY



               MOUTH



               DAILY

 

 Zocor  NDC  [id number]  80 MG      Active  TOME CARLOS



               TABLETA



               TODOS LOS



               PADRON

 

 Citalopram  NDC  [id number]  10 MG      Active  TOME CARLOS



 Hydrobromide              TABLETA



               POR VIA



               ORAL ONCE



               A DAY

 

 Cozaar  NDC  [id number]  25 MG Orally      Active  1 tablet



       Once a day        

 

 Aspir-81  NDC  [id number]  81 MG Orally      Active  1 tablet



       Once a day        

 

 Promethazine HCl  NDC  [id number]  25 MG Orally      Active  1 tablet



       every 6 hrs        as needed

 

 Levemir  NDC  [id number]  100 UNIT/ML  May 08,    Active  50 units



       Subcutaneous  2018      



       once daily        

 

 Simvastatin  NDC  [id number]  80 MG Orally      Active  1 tablet



       Once a day        in the



               evening

 

 Omeprazole  NDC  [id number]  40 MG Orally      Active  1 capsule



       Once a day        







Results

No Known Results



Summary Purpose

[employer]ClinicalWorks Submission

## 2018-08-17 LAB
BUN BLD-MCNC: 35 MG/DL (ref 7–18)
GLUCOSE SERPLBLD-MCNC: 277 MG/DL (ref 74–106)
HCT VFR BLD CALC: 29.4 % (ref 36–45)
HDLC SERPL-MCNC: 29 MG/DL (ref 40–60)
LDLC SERPL CALC-MCNC: 67 MG/DL (ref ?–130)
LYMPHOCYTES # SPEC AUTO: 1.4 K/UL (ref 0.7–4.9)
MCH RBC QN AUTO: 33.6 PG (ref 27–35)
MCV RBC: 94.4 FL (ref 80–100)
PMV BLD: 10.6 FL (ref 7.6–11.3)
POTASSIUM SERPL-SCNC: 5.1 MMOL/L (ref 3.5–5.1)
RBC # BLD: 3.12 M/UL (ref 3.86–4.86)

## 2018-08-17 RX ADMIN — FLUTICASONE PROPIONATE SCH SPRAY: 50 SPRAY, METERED NASAL at 20:54

## 2018-08-17 RX ADMIN — SODIUM CHLORIDE SCH MG: 0.9 INJECTION, SOLUTION INTRAVENOUS at 06:48

## 2018-08-17 RX ADMIN — FLUTICASONE PROPIONATE SCH: 50 SPRAY, METERED NASAL at 09:00

## 2018-08-17 RX ADMIN — CITALOPRAM HYDROBROMIDE SCH MG: 10 TABLET ORAL at 20:53

## 2018-08-17 RX ADMIN — Medication SCH ML: at 05:08

## 2018-08-17 RX ADMIN — HUMAN INSULIN SCH UNIT: 100 INJECTION, SOLUTION SUBCUTANEOUS at 21:55

## 2018-08-17 RX ADMIN — INSULIN LISPRO SCH UNIT: 100 INJECTION, SOLUTION INTRAVENOUS; SUBCUTANEOUS at 11:55

## 2018-08-17 RX ADMIN — METOPROLOL TARTRATE SCH: 50 TABLET, FILM COATED ORAL at 20:54

## 2018-08-17 RX ADMIN — METOPROLOL TARTRATE SCH: 50 TABLET, FILM COATED ORAL at 09:00

## 2018-08-17 RX ADMIN — SODIUM CHLORIDE SCH MLS: 9 INJECTION, SOLUTION INTRAVENOUS at 20:53

## 2018-08-17 RX ADMIN — Medication SCH ML: at 20:55

## 2018-08-17 RX ADMIN — PNEUMOCOCCAL VACCINE POLYVALENT ONE
25; 25; 25; 25; 25; 25; 25; 25; 25; 25; 25; 25; 25; 25; 25; 25; 25; 25; 25; 25; 25; 25; 25 INJECTION, SOLUTION INTRAMUSCULAR; SUBCUTANEOUS at 09:00

## 2018-08-17 RX ADMIN — CETIRIZINE HYDROCHLORIDE SCH MG: 5 TABLET, FILM COATED ORAL at 08:59

## 2018-08-17 RX ADMIN — ATORVASTATIN CALCIUM SCH MG: 40 TABLET, FILM COATED ORAL at 20:53

## 2018-08-17 RX ADMIN — Medication SCH ML: at 09:09

## 2018-08-17 RX ADMIN — SODIUM CHLORIDE SCH MG: 0.9 INJECTION, SOLUTION INTRAVENOUS at 08:58

## 2018-08-17 RX ADMIN — INSULIN LISPRO SCH UNIT: 100 INJECTION, SOLUTION INTRAVENOUS; SUBCUTANEOUS at 08:58

## 2018-08-17 RX ADMIN — ISOSORBIDE MONONITRATE SCH MG: 30 TABLET, EXTENDED RELEASE ORAL at 08:59

## 2018-08-17 RX ADMIN — HUMAN INSULIN SCH: 100 INJECTION, SOLUTION SUBCUTANEOUS at 16:30

## 2018-08-17 RX ADMIN — ENOXAPARIN SODIUM SCH MG: 40 INJECTION SUBCUTANEOUS at 08:58

## 2018-08-17 RX ADMIN — ASPIRIN SCH MG: 81 TABLET, COATED ORAL at 09:01

## 2018-08-17 NOTE — P.PN
Subjective


Date of Service: 08/17/18


Chief Complaint: Chest pain rule out acute coronary syndrome








Pt seen and examined with team. Chart reviewed. 


-Overnight no c/o offer 


-Today feels better than before. C/o of being tired today


-Denies Fever, Chills, SOB or CP at this time 





Review of Systems


10-point ROS is otherwise unremarkable


General: Weakness, Malaise





Physical Examination





- Vital Signs


Temperature: 97.3 F


Blood Pressure: 117/59


Pulse: 65


Respirations: 12


Pulse Ox (%): 98





- Physical Exam


General: Alert, In no apparent distress


HEENT: Atraumatic, PERRLA, EOMI


Neck: Supple, JVD not distended


Respiratory: Clear to auscultation bilaterally, Normal air movement


Cardiovascular: Regular rate/rhythm, Normal S1 S2


Gastrointestinal: Normal bowel sounds, No tenderness


Musculoskeletal: No tenderness


Integumentary: No rashes


Neurological: Normal speech, Normal tone, Normal affect


Lymphatics: No axilla or inguinal lymphadenopathy





- Studies


Laboratory Data (last 24 hrs)





08/16/18 19:20: PT 11.2, INR 0.95, APTT 31.6


08/16/18 19:20: WBC 4.4  D, Hgb 10.3 L, Hct 30.0 L, Plt Count 101 L


08/16/18 19:20: Sodium 138, Potassium 4.7, BUN 38 H, Creatinine 1.30, Glucose 

262 H, Magnesium 2.2, Total Bilirubin 0.4, AST 23, ALT 26, Alkaline Phosphatase 

72, Lipase 90





Medications List Reviewed: Yes





Assessment & Plan





- Problems (Diagnosis)


(1) Chest pain


Onset Date: 08/17/18   Current Visit: No   Status: Acute   


Plan: 


Atypical Chest Pain 


-Troponinx 2 negative. EKG with no acute changes 


-ACS medication - BB, ARB, ASA and statin


-Cardiology consulted. Appreciate Reccs


-ECHO pending 





Qualifiers: 


   Chest pain type: other chest pain   Qualified Code(s): R07.89 - Other chest 

pain; R07.8 - Other chest pain   





(2) UTI (urinary tract infection)


Current Visit: Yes   Status: Acute   


Plan: 


UA concern for UTI. Patient with Generalized weakness 


-Culture + for 3+ gram - rods 


-IV rocephin for now 





Qualifiers: 


   Urinary tract infection type: urethritis   Qualified Code(s): N34.2 - Other 

urethritis   





(3) HTN (hypertension)


Current Visit: Yes   Status: Chronic   


Qualifiers: 


   Hypertension type: essential hypertension   Qualified Code(s): I10 - 

Essential (primary) hypertension   





(4) Diabetes


Current Visit: Yes   Status: Chronic   


Qualifiers: 


   Diabetes mellitus type: type 2   Diabetes mellitus long term insulin use: 

with long term use   Diabetes mellitus complication status: without 

complication   Qualified Code(s): E11.9 - Type 2 diabetes mellitus without 

complications; Z79.4 - Long term (current) use of insulin   





(5) Hyperlipidemia


Current Visit: Yes   Status: Chronic   


Qualifiers: 


   Hyperlipidemia type: mixed hyperlipidemia   Qualified Code(s): E78.2 - Mixed 

hyperlipidemia

## 2018-08-17 NOTE — EKG
Test Date:    2018-08-16               Test Time:    18:43:50

Technician:   OLIVIA                                     

                                                     

MEASUREMENT RESULTS:                                       

Intervals:                                           

Rate:         63                                     

RI:           176                                    

QRSD:         128                                    

QT:           452                                    

QTc:          462                                    

Axis:                                                

P:            36                                     

RI:           176                                    

QRS:          87                                     

T:            85                                     

                                                     

INTERPRETIVE STATEMENTS:                                       

                                                     

Normal sinus rhythm

Right bundle branch block

Abnormal ECG

Compared to ECG 03/10/2016 10:26:51

Right bundle-branch block now present

Sinus arrhythmia no longer present



Electronically Signed On 08-17-18 07:01:32 CDT by James Velasquez

## 2018-08-17 NOTE — CON
Date of Consultation:  08/17/2018



Reason For Consultation:  Congestive heart failure.



History Of Present Illness:  Ms. Lancaster is an 82-year-old Latin-American woman, she is known to me from
 previous office visits and admissions.  She has a history of hypertension, dyslipidemia, diabetes, h
as a history of coronary artery disease, status post CABG.  In 2016, a heart catheterization showed a
n occluded vein graft to the OM.  She had a patent LIMA to the LAD.  She had an RCA graft that is pat
ent to the RCA.  She had 100% occlusion of all her native vessels.  She has been treated successfully
 for coronary artery disease since.  She came in mostly with dyspnea on exertion with some sharp ches
t pain.  She had a negative troponin.  She had a BNP of 881.  Her glucose was 262.  She was hypertens
sukhwinder at 180/60.  She had a UTI.



Allergies:  NONE.



Review of Systems:

Negative.



Social History:  Negative.



Family History:  Noncontributory.



Medications:  At home include aspirin, insulin, Imdur, Cozaar, Toprol, Zocor, and Ultram.



Physical Examination:

General:  Ms. Lancaster is obese.  She was in no acute distress. 

Vital signs:  Stable.  She was afebrile.  She was in sinus rhythm.  Blood pressure initially was 181/
61, was 140/70 this morning. 

HEENT: Negative. 

Neck:  Supple without any bruit, lymphadenopathy, JVD, or thyromegaly. 

Chest:  Clear to auscultation and percussion today. 

Extremities:  Revealed no clubbing, cyanosis.  She had trace edema. 

Abdomen:  Obese, but benign.



Diagnostic Data:  As stated earlier.  Her chest x-ray shows CHF.  EKG was nonspecific.



Impression And Plan:  

1.New onset acute diastolic congestive heart failure.

2.Chronic coronary artery disease.

3.Hypertension, poorly controlled.

4.Dyslipidemia.

5.History of coronary artery disease, status post coronary artery bypass graft.

6.Urinary tract infection.

7.Mild anemia. 



I agree with the echocardiogram that is pending today.  We will continue her present medication.  She
 received 1 dose of Lasix and her edema and chest x-ray have resolved.  I would definitely send her h
ome on her home medication plus Lasix 40 mg daily.  I would also like to increase her beta-blocker li
ke it already has been done, that will control her blood pressure and it is a good medicine for diast
olic congestive heart failure.  I do not have any further plans for another catheterization at this p
oint.  The case was discussed with the with nurse and Dr. Rosas and the family.  Compliance issues wer
e discussed regarding salt intake and medication intake. 



Forty five minutes were spent in the care of Mrs Lancaster.





MACIE

DD:  08/17/2018 08:48:03Voice ID:  [id number]

DT:  08/17/2018 13:35:07Report ID:  [id number]

## 2018-08-17 NOTE — P.HP
Certification for Inpatient


Patient admitted to: Observation


With expected LOS: <2 Midnights


Patient will require the following post-hospital care: None


Practitioner: I am a practitioner with admitting privileges, knowledge of 

patient current condition, hospital course, and medical plan of care.


Services: Services provided to patient in accordance with Admission 

requirements found in Title 42 Section 412.3 of the Code of Federal Regulations





Patient History


Date of Service: 08/16/18


Reason for admission: Chest pain rule out acute coronary syndrome


History of Present Illness: 


  Patient is a 82-year-old female came to the hospital chest discomfort.  Pain 

was mainly in the sternal region.  She states the pain started substernally.  

And then went around to the sternum.  There is no diaphoresis, shortness of 

breath, lightheadedness, nausea, or vomiting.  She described it as a pressure 

sensation.  She came into the hospital for evaluation.  In the emergency room 

she had serial troponins and EKG which are negative at this time.  She will be 

admitted to the hospital for further evaluation.








Allergies





No Known Allergies Allergy (Verified 05/05/16 13:16)


 





Home Medications: 








Aspirin [Aspirin EC 81 MG] 81 mg PO DAILY 08/16/18 


Cetirizine HCl [Zyrtec] 10 mg PO DAILY 08/16/18 


Citalopram Hydrobromide [Citalopram HBr] 10 mg PO BEDTIME 08/16/18 


Fluticasone [Flonase 50MCG Nasal Spray*] 1 spray NS TID PRN 08/16/18 


Insulin Aspart [Novolog Flexpen] 14 units SQ SEECOM 08/16/18 


Insulin Detemir [Levemir Flextouch] 65 unit SQ BEDTIME 08/16/18 


Isosorbide Mononitrate [Isosorbide Mononitrate ER] 30 mg PO DAILY 08/16/18 


Losartan Potassium [Cozaar] 25 mg PO DAILY 08/16/18 


Metoprolol Succinate [Toprol Xl] 25 mg PO DAILY 08/16/18 


Simvastatin 80 mg PO BEDTIME 08/16/18 


Tramadol HCl [Ultram] 50 mg PO Q6HR PRN 08/16/18 








- Past Medical/Surgical History


Has patient received pneumonia vaccine in the past: No


Diabetic: Yes


-:   Dyslipidemia


-:  depression


-:  type 2 diabetes


-:  coronary artery disease


-:  hypertension


-: hysterectomy


-: cholecystectomy


-: appendectomy


-: lens implant on right eye


-: bilateral cataracts





- Family History


  ** Mother


Medical History: Cancer


Notes: pancreatic cancer





- Social History


Smoking Status: Former smoker


Alcohol use: No


CD- Drugs: No


Caffeine use: Yes


Place of Residence: Home





Review of Systems


10-point ROS is otherwise unremarkable





Physical Examination





- Vital Signs


Temperature: 97.3 F


Blood Pressure: 117/59


Pulse: 65


Respirations: 12


Pulse Ox (%): 98





- Physical Exam


General: Alert, In no apparent distress, Oriented x3


HEENT: Atraumatic, PERRLA, Mucous membr. moist/pink, EOMI, Sclerae nonicteric


Neck: Supple, 2+ carotid pulse no bruit, No LAD, Without JVD or thyroid 

abnormality


Respiratory: Clear to auscultation bilaterally, Normal air movement


Cardiovascular: Regular rate/rhythm, Normal S1 S2, No murmurs


Gastrointestinal: Normal bowel sounds, Soft and benign, Non-distended, No 

tenderness


Musculoskeletal: No clubbing, No swelling, No tenderness


Integumentary: No rashes


Neurological: Normal gait, Normal speech, Normal strength at 5/5 x4 extr, 

Normal tone, Sensation intact, Cranial nerves 3-12 intact, Normal affect


Lymphatics: No axilla or inguinal lymphadenopathy





- Studies


Laboratory Data (last 24 hrs)





08/16/18 19:20: PT 11.2, INR 0.95, APTT 31.6


08/16/18 19:20: WBC 4.4  D, Hgb 10.3 L, Hct 30.0 L, Plt Count 101 L


08/16/18 19:20: Sodium 138, Potassium 4.7, BUN 38 H, Creatinine 1.30, Glucose 

262 H, Magnesium 2.2, Total Bilirubin 0.4, AST 23, ALT 26, Alkaline Phosphatase 

72, Lipase 90








Assessment & Plan





- Plan





 assessment:


1.  Chest pain rule out acute coronary syndrome


2. history of coronary artery disease


3.  History of diabetes 


4.  History of hypertension 


5. gastritis





 PLAN:


1. Serial troponins and EKG


2. Cardiology consultation


3. Echocardiogram and inpatient stress test(pending cardiology evaluation)


4. Anti-platelet therapy, anti coagulation, beta-blocker, statin, and O2 as 

needed


5. IV morphine for pain


6. Nitro p.r.n.





Discharge Plan: Home


Plan to discharge in: 24 Hours





- Advance Directives


Does patient have a Living Will: Yes


Does patient have a Durable POA for Healthcare: Yes





- Code Status/Comfort Care


Code Status Assessed: Yes


Code Status: Full Code


Critical Care: No


Time Spent Managing PTS Care (In Minutes): 50

## 2018-08-18 PROCEDURE — 02HV33Z INSERTION OF INFUSION DEVICE INTO SUPERIOR VENA CAVA, PERCUTANEOUS APPROACH: ICD-10-PCS

## 2018-08-18 RX ADMIN — FLUTICASONE PROPIONATE SCH SPRAY: 50 SPRAY, METERED NASAL at 20:54

## 2018-08-18 RX ADMIN — SODIUM CHLORIDE SCH MLS: 9 INJECTION, SOLUTION INTRAVENOUS at 20:54

## 2018-08-18 RX ADMIN — FUROSEMIDE SCH: 40 TABLET ORAL at 16:42

## 2018-08-18 RX ADMIN — HUMAN INSULIN SCH UNIT: 100 INJECTION, SOLUTION SUBCUTANEOUS at 08:10

## 2018-08-18 RX ADMIN — ASPIRIN SCH MG: 81 TABLET, COATED ORAL at 08:12

## 2018-08-18 RX ADMIN — CITALOPRAM HYDROBROMIDE SCH MG: 10 TABLET ORAL at 20:55

## 2018-08-18 RX ADMIN — ENOXAPARIN SODIUM SCH MG: 40 INJECTION SUBCUTANEOUS at 08:10

## 2018-08-18 RX ADMIN — METOPROLOL TARTRATE SCH MG: 50 TABLET, FILM COATED ORAL at 08:11

## 2018-08-18 RX ADMIN — CETIRIZINE HYDROCHLORIDE SCH MG: 5 TABLET, FILM COATED ORAL at 08:10

## 2018-08-18 RX ADMIN — FLUTICASONE PROPIONATE SCH SPRAY: 50 SPRAY, METERED NASAL at 08:10

## 2018-08-18 RX ADMIN — LOSARTAN POTASSIUM SCH MG: 50 TABLET, FILM COATED ORAL at 08:11

## 2018-08-18 RX ADMIN — METOPROLOL TARTRATE SCH: 50 TABLET, FILM COATED ORAL at 21:00

## 2018-08-18 RX ADMIN — ISOSORBIDE MONONITRATE SCH MG: 30 TABLET, EXTENDED RELEASE ORAL at 08:10

## 2018-08-18 RX ADMIN — Medication SCH ML: at 08:12

## 2018-08-18 RX ADMIN — HUMAN INSULIN SCH UNIT: 100 INJECTION, SOLUTION SUBCUTANEOUS at 20:55

## 2018-08-18 RX ADMIN — ATORVASTATIN CALCIUM SCH MG: 40 TABLET, FILM COATED ORAL at 20:56

## 2018-08-18 RX ADMIN — SODIUM CHLORIDE SCH MLS: 9 INJECTION, SOLUTION INTRAVENOUS at 08:12

## 2018-08-18 RX ADMIN — HUMAN INSULIN SCH: 100 INJECTION, SOLUTION SUBCUTANEOUS at 16:30

## 2018-08-18 RX ADMIN — Medication SCH ML: at 20:55

## 2018-08-18 RX ADMIN — HUMAN INSULIN SCH UNIT: 100 INJECTION, SOLUTION SUBCUTANEOUS at 12:01

## 2018-08-18 NOTE — CON
Date of Consultation:  08/17/2018



NEPHROLOGY CONSULTATION



Additional Consulting Physician:  Dr. Reddy.



Reason For Consultation:  Elevated BUN and creatinine, electrolyte imbalance, 
UTI.



History Of Present Illness:  This is an 82-year-old female, well known to us 
from the office, known to Dr. Sherman with significant past medical history of 
hypertension, diabetes complicated with neuropathy, hyperlipidemia, chronic 
kidney disease, stage 3, baseline creatinine of 1.1 to 1.2. 



Patient came to the hospital because of pain on the sternal area without any 
nausea or any vomiting.  Had some lightheadedness.  The patient was admitted 
for workup for acute coronary syndrome.  The patient also had UTI with ESBL.  
We have been consulted for further nephrology management.



Allergies:  NO KNOWN DRUG ALLERGIES.



Home Medications:  Include:

1.   Aspirin.

2.   Tramadol.

3.   Flonase.

4.   Insulin.

5.   Isosorbide.

6.   Losartan 25.

7.   Metoprolol.

8.   Simvastatin.





Past Medical History:  

1.   Diabetes.

2.   Hypertension.

3.   Coronary artery disease.

4.   Hyperlipidemia.

5.   Chronic kidney disease, baseline creatinine 1.1 to 1.2.



Past Surgical History:  Includes;

1.   Appendectomy.

2.   Cholecystectomy.

3.   Bilateral cataract.



Family History:  Positive for cancer and hypertension.



Social History:  Is a smoker.  Denies alcohol.  Denies drug abuse.



Review of Systems:

Head and Neck:  No red eye.  No ear pain. 

GI:  Has abdominal pain. 

:  No polyuria.  No dysuria.  No hematuria. 

GYN:  No vaginal discharge. 

Respiratory:  No shortness of breath. 

Cardiovascular:  No chest pain. 

Neuro:  Has neuropathy. 

Musculoskeletal:  No joint pain. 

Endocrine:  No polydipsia. 

Skin:  No rash.



Current Medications:  Include:

1.   Aspirin.

2.   Insulin.

3.   Lovenox.

4.   Isosorbide.

5.   Losartan 25.

6.   Nitroglycerin.

7.   Metoprolol.

8.   Advair.





Physical Examination:

General:  When I saw the patient, the patient was lying in bed, comfortable. 

Vital Signs:  When I saw the patient, blood pressure 117/56, pulse of 55, 
afebrile. 

Chest:  Clear to auscultation. 

Heart:  S1, S2.  Regular. 

Abdomen:  Soft, nontender. 

EXTREMITIES:  No edema. 

NEUROLOGIC:  Alert, oriented x3.  Nonfocal.



Laboratory Data:  WBC 4.2, H and H 10.5/29.4, platelets 102.  Sodium 139, 
potassium 5.1, bicarb 28, BUN 35, creatinine 1.2, calcium 8.7, and glucose 277.
  Baseline creatinine 1.1 to 1.2.  Urinalysis, nitrite positive.



Microbiology:  Growing on urine, ESBL E coli.



Assessment And Plan:  

1.   Chronic kidney disease, stage 3, stable on baseline, normal-sized kidney.  
We will continue current medication.

2.   Hypertension, controlled, optimal.  Continue current medication.

3.   Urinary tract infection extended-spectrum beta-lactamases.  Discontinue 
cephalosporins.  Start the patient on meropenem and we will follow up.

4.   Chest pain.  We will follow up with Cardiology. 



Case discussed with staff, discussed with Dr. Reddy, agreed on the plan.





VIVIAN

DD:  08/17/2018 23:21:28   Voice ID:  [id number]

DT:  08/18/2018 03:46:17   Report ID:  [id number]

[email or web address]

## 2018-08-18 NOTE — PN
Date of Progress Note:  08/18/2018



Subjective:  Ms. Lancaster was seen yesterday on 08/17/2018 for acute exacerbation of diastolic congestive
 heart failure.  Echocardiogram showed decreased left ventricular compliance with a normal ejection f
raction.  Mitral annular calcification with some tricuspid regurgitation, normal right ventricular sy
stolic pressure.  Today, she is not having any chest pain.  No rales.  No edema.  I would send her ho
me on her home medication plus Lasix 40 b.i.d., and we will see her in the office in 2 weeks.





NB/ERIK

DD:  08/18/2018 08:21:03Voice ID:  [id number]

DT:  08/18/2018 13:03:18Report ID:  [id number]

## 2018-08-18 NOTE — RAD REPORT
EXAM DESCRIPTION:  RAD - Chest Single View - 8/18/2018 3:30 pm

 

CLINICAL HISTORY:   Device placement PICC line placement

 

COMPARISON:  August 16

 

FINDINGS:   A PICC line has been inserted with its tip in the  superior vena cava.

 

The lungs appear clear of acute infiltrate. The heart is mildly enlarged. Postsurgical changes involv
e the chest. .

 

IMPRESSION:   PICC line with its tip in the  superior vena cava

## 2018-08-18 NOTE — P.PN
Subjective


Date of Service: 08/18/18


Chief Complaint: Chest pain rule out acute coronary syndrome


Pt seen and examined with team. Chart reviewed. 


-Overnight no c/o offer 


-Today feels better than before. 


-Denies Fever, Chills, SOB or CP at this time 





Review of Systems


General: As per HPI





Physical Examination





- Vital Signs


Temperature: 98.4 F


Blood Pressure: 133/62


Pulse: 63


Respirations: 17


Pulse Ox (%): 95





- Physical Exam


General: Alert, In no apparent distress


HEENT: Atraumatic, PERRLA, EOMI


Neck: Supple, JVD not distended


Respiratory: Clear to auscultation bilaterally, Normal air movement


Cardiovascular: Regular rate/rhythm, Normal S1 S2


Gastrointestinal: Normal bowel sounds, No tenderness


Musculoskeletal: No tenderness


Integumentary: No rashes


Neurological: Normal speech, Normal tone, Normal affect


Lymphatics: No axilla or inguinal lymphadenopathy





- Studies


Microbiology Data (last 24 hrs): 








08/16/18 19:25   Clean Catch Urine   [place] Count - Final


                            >100,000 CFU/ML.


08/16/18 19:25   Clean Catch Urine    - Final


                            Escherichia Coli





Medications List Reviewed: Yes





Assessment & Plan





- Problems (Diagnosis)


(1) Chest pain


Onset Date: 08/17/18   Current Visit: No   Status: Acute   


Plan: 


Atypical Chest Pain 


-Troponin x 2 negative. EKG with no acute changes 


-ACS medication - BB, ARB, ASA and statin


-Cardiology consulted. Appreciate Reccs


-ECHO With Diasystolic Dysfunction 


   -Started on Lasix 40mg BID 





Qualifiers: 


   Chest pain type: other chest pain   Qualified Code(s): R07.89 - Other chest 

pain; R07.8 - Other chest pain   





(2) UTI (urinary tract infection)


Current Visit: Yes   Status: Acute   


Plan: 


UA concern for UTI. Patient with Generalized weakness 


-Culture + for ESBL


-IV merrem 1g BID 


-PICC line and HH to be arranged for her IV abx for 2 weeks 








Qualifiers: 


   Urinary tract infection type: urethritis   Qualified Code(s): N34.2 - Other 

urethritis   





(3) HTN (hypertension)


Current Visit: Yes   Status: Chronic   


Qualifiers: 


   Hypertension type: essential hypertension   Qualified Code(s): I10 - 

Essential (primary) hypertension   





(4) Diabetes


Current Visit: Yes   Status: Chronic   


Qualifiers: 


   Diabetes mellitus type: type 2   Diabetes mellitus long term insulin use: 

with long term use   Diabetes mellitus complication status: without 

complication   Qualified Code(s): E11.9 - Type 2 diabetes mellitus without 

complications; Z79.4 - Long term (current) use of insulin   





(5) Hyperlipidemia


Current Visit: Yes   Status: Chronic   


Qualifiers: 


   Hyperlipidemia type: mixed hyperlipidemia   Qualified Code(s): E78.2 - Mixed 

hyperlipidemia   


Discharge Plan: Home


Plan to discharge in: 48 Hours





- Code Status/Comfort Care


Code Status Assessed: Yes


Critical Care: No

## 2018-08-18 NOTE — PN
Date of Progress Note:  08/18/2018



Subjective:  Patient is doing better.  No nausea.  No vomiting.  Patient is going for a PICC line tobin
cement today.  No shortness of breath.



Physical Examination:

Vital Signs:  When I saw the patient, blood pressure of 133/62, pulse of 63, afebrile. 

Chest:  Clear to auscultation. 

Heart:  S1, S2.  Regular. 

Abdomen:  Soft, nontender. 

Extremities:  No edema.



Laboratory Data:  H and H 10.5/29.4.  Sodium 139, potassium 5.1, bicarb 28, BUN 35, creatinine 1.2.



Current Medications:  The patient is on include:

1.Aspirin.

2.Meropenem.

3.Cetirizine.

4.Lovenox.

5.Losartan 25.

6.Isosorbide.

7.Metoprolol.

8.Lasix 40 b.i.d.



Assessment And Plan:  

1.Chronic kidney disease, stage 3, stable on baseline.  We will continue current medication.

2.Edema.  Continue Lasix.

3.Urinary tract infection Extended-spectrum beta-lactamase.  Continue current antibiotic.  Plan for 
outpatient setup.

4.Chest pain, acute coronary syndrome will be ruled out.  Follow up with the primary.  Case was disc
ussed with the patient, who 

verbalized understanding.  Discussed with Dr. Reddy, agreed on the plan.





VIVIAN

DD:  08/18/2018 14:19:18Voice ID:  [id number]

DT:  08/18/2018 18:57:06Report ID:  [id number]

## 2018-08-19 RX ADMIN — SODIUM CHLORIDE SCH MLS: 9 INJECTION, SOLUTION INTRAVENOUS at 21:26

## 2018-08-19 RX ADMIN — ENOXAPARIN SODIUM SCH MG: 40 INJECTION SUBCUTANEOUS at 09:11

## 2018-08-19 RX ADMIN — FUROSEMIDE SCH MG: 40 TABLET ORAL at 09:12

## 2018-08-19 RX ADMIN — ASPIRIN SCH MG: 81 TABLET, COATED ORAL at 09:11

## 2018-08-19 RX ADMIN — Medication SCH ML: at 21:27

## 2018-08-19 RX ADMIN — LOSARTAN POTASSIUM SCH MG: 50 TABLET, FILM COATED ORAL at 09:19

## 2018-08-19 RX ADMIN — HUMAN INSULIN SCH UNIT: 100 INJECTION, SOLUTION SUBCUTANEOUS at 09:10

## 2018-08-19 RX ADMIN — SODIUM CHLORIDE SCH MLS: 9 INJECTION, SOLUTION INTRAVENOUS at 09:14

## 2018-08-19 RX ADMIN — ISOSORBIDE MONONITRATE SCH MG: 30 TABLET, EXTENDED RELEASE ORAL at 09:12

## 2018-08-19 RX ADMIN — HUMAN INSULIN SCH UNIT: 100 INJECTION, SOLUTION SUBCUTANEOUS at 21:28

## 2018-08-19 RX ADMIN — CETIRIZINE HYDROCHLORIDE SCH MG: 5 TABLET, FILM COATED ORAL at 09:11

## 2018-08-19 RX ADMIN — METOPROLOL TARTRATE SCH MG: 50 TABLET, FILM COATED ORAL at 21:26

## 2018-08-19 RX ADMIN — FLUTICASONE PROPIONATE SCH SPRAY: 50 SPRAY, METERED NASAL at 21:00

## 2018-08-19 RX ADMIN — FLUTICASONE PROPIONATE SCH SPRAY: 50 SPRAY, METERED NASAL at 09:00

## 2018-08-19 RX ADMIN — HUMAN INSULIN SCH UNIT: 100 INJECTION, SOLUTION SUBCUTANEOUS at 12:30

## 2018-08-19 RX ADMIN — CITALOPRAM HYDROBROMIDE SCH MG: 10 TABLET ORAL at 21:26

## 2018-08-19 RX ADMIN — ATORVASTATIN CALCIUM SCH MG: 40 TABLET, FILM COATED ORAL at 21:26

## 2018-08-19 RX ADMIN — FUROSEMIDE SCH MG: 40 TABLET ORAL at 17:06

## 2018-08-19 RX ADMIN — HUMAN INSULIN SCH UNIT: 100 INJECTION, SOLUTION SUBCUTANEOUS at 17:05

## 2018-08-19 RX ADMIN — Medication SCH ML: at 09:15

## 2018-08-19 RX ADMIN — METOPROLOL TARTRATE SCH: 50 TABLET, FILM COATED ORAL at 09:00

## 2018-08-19 NOTE — PN
Date of Progress Note:  08/19/2018



Subjective:  Patient was admitted with ESBL gastroenteritis.



Physical Examination:

Vital Signs:  Blood pressure 128/56, pulse of 58. 

Chest:  Clear to auscultation. 

Heart:  S1, S2.  Regular.  Systolic murmur. 

Abdomen:  Soft, nontender. 

Extremities:  No edema.



Laboratory Data:  WBC 4.2, H and H 10.5/29.4, platelets 102.  Sodium 139, potassium 5.1, bicarb 28, B
UN 35, creatinine 1.2, calcium 8.7.



Current Medications:  The patient on its include:

1.Aspirin.

2.Meropenem 1 g b.i.d.

3.Cetrizine.

4.Isosorbide.

5.Losartan 25 daily.

6.Metoprolol 50 b.i.d.

7.Lasix 40 b.i.d.



Assessment And Plan:  

1.Chronic kidney disease, stage 3, stable on her baseline, normal volume.  Continue current medicati
on.  We will monitor.

2.Hypertension, controlled, optimal.  Continue current treatment.  Continue ARB.

3.Hyperkalemia, marginal.  We will follow up after adding the Lasix.

4.Urinary tract infection Escherichia coli, extended-spectrum beta-lactamase.  PICC line has been in
serted.  Plan for discontinue, on IV antibiotic.

5.We will follow up with the primary.  Patient cleared from the renal 

standpoint for discharge planning to follow up in the office in 2-3 weeks.





VIVIAN

DD:  08/19/2018 11:17:44Voice ID:  [id number]

DT:  08/19/2018 16:51:46Report ID:  [id number]

## 2018-08-19 NOTE — P.PN
Subjective


Date of Service: 08/19/18


Chief Complaint: Chest pain rule out acute coronary syndrome


Pt seen and examined with team. Chart reviewed. 


-Overnight no c/o offer 


-Today feels better than before. 


-Denies Fever, Chills, SOB or CP at this time 


-Awaiting HH set for IV abx   





Review of Systems


General: As per HPI





Physical Examination





- Vital Signs


Temperature: 97.4 F


Blood Pressure: 128/56


Pulse: 58


Respirations: 18


Pulse Ox (%): 94





- Physical Exam


General: Alert, In no apparent distress


HEENT: Atraumatic, PERRLA, EOMI


Neck: Supple, JVD not distended


Respiratory: Clear to auscultation bilaterally, Normal air movement


Cardiovascular: Regular rate/rhythm, Normal S1 S2


Gastrointestinal: Normal bowel sounds, No tenderness


Musculoskeletal: No tenderness


Integumentary: No rashes


Neurological: Normal speech, Normal tone, Normal affect


Lymphatics: No axilla or inguinal lymphadenopathy





- Studies


Microbiology Data (last 24 hrs): 








08/16/18 19:25   Clean Catch Urine   [place] Count - Final


                            >100,000 CFU/ML.


08/16/18 19:25   Clean Catch Urine    - Final


                            Escherichia Coli





Medications List Reviewed: Yes





Assessment & Plan





- Problems (Diagnosis)


(1) Chest pain


Onset Date: 08/17/18   Current Visit: No   Status: Acute   


Plan: 


Atypical Chest Pain 


-Troponin x 2 negative. EKG with no acute changes 


-ACS medication - BB, ARB, ASA and statin


-Cardiology consulted. Appreciate Reccs


-ECHO With Diasystolic Dysfunction 


   -Started on Lasix 40mg BID 





Qualifiers: 


   Chest pain type: other chest pain   Qualified Code(s): R07.89 - Other chest 

pain; R07.8 - Other chest pain   





(2) UTI (urinary tract infection)


Current Visit: Yes   Status: Acute   


Plan: 


UA concern for UTI. Patient with Generalized weakness 


-Culture + for ESBL


-IV merrem 1g BID 


-PICC line and HH to be arranged for her IV abx for 2 weeks 








Qualifiers: 


   Urinary tract infection type: urethritis   Qualified Code(s): N34.2 - Other 

urethritis   





(3) HTN (hypertension)


Current Visit: Yes   Status: Chronic   


Qualifiers: 


   Hypertension type: essential hypertension   Qualified Code(s): I10 - 

Essential (primary) hypertension   





(4) Diabetes


Current Visit: Yes   Status: Chronic   


Qualifiers: 


   Diabetes mellitus type: type 2   Diabetes mellitus long term insulin use: 

with long term use   Diabetes mellitus complication status: without 

complication   Qualified Code(s): E11.9 - Type 2 diabetes mellitus without 

complications; Z79.4 - Long term (current) use of insulin   





(5) Hyperlipidemia


Current Visit: Yes   Status: Chronic   


Qualifiers: 


   Hyperlipidemia type: mixed hyperlipidemia   Qualified Code(s): E78.2 - Mixed 

hyperlipidemia   


Discharge Plan: Home


Plan to discharge in: 24 Hours





- Code Status/Comfort Care


Code Status Assessed: Yes


Critical Care: No

## 2018-08-20 LAB
ALBUMIN SERPL BCP-MCNC: 3.6 G/DL (ref 3.4–5)
BUN BLD-MCNC: 35 MG/DL (ref 7–18)
GLUCOSE SERPLBLD-MCNC: 180 MG/DL (ref 74–106)
POTASSIUM SERPL-SCNC: 4.6 MMOL/L (ref 3.5–5.1)

## 2018-08-20 RX ADMIN — CETIRIZINE HYDROCHLORIDE SCH MG: 5 TABLET, FILM COATED ORAL at 09:33

## 2018-08-20 RX ADMIN — FUROSEMIDE SCH MG: 40 TABLET ORAL at 09:33

## 2018-08-20 RX ADMIN — SODIUM CHLORIDE SCH MLS: 9 INJECTION, SOLUTION INTRAVENOUS at 09:35

## 2018-08-20 RX ADMIN — LOSARTAN POTASSIUM SCH MG: 50 TABLET, FILM COATED ORAL at 09:34

## 2018-08-20 RX ADMIN — HUMAN INSULIN SCH: 100 INJECTION, SOLUTION SUBCUTANEOUS at 21:00

## 2018-08-20 RX ADMIN — HUMAN INSULIN SCH UNIT: 100 INJECTION, SOLUTION SUBCUTANEOUS at 09:33

## 2018-08-20 RX ADMIN — ASPIRIN SCH MG: 81 TABLET, COATED ORAL at 09:34

## 2018-08-20 RX ADMIN — FLUTICASONE PROPIONATE SCH SPRAY: 50 SPRAY, METERED NASAL at 21:00

## 2018-08-20 RX ADMIN — ENOXAPARIN SODIUM SCH MG: 40 INJECTION SUBCUTANEOUS at 09:32

## 2018-08-20 RX ADMIN — METOPROLOL TARTRATE SCH MG: 50 TABLET, FILM COATED ORAL at 21:53

## 2018-08-20 RX ADMIN — ATORVASTATIN CALCIUM SCH MG: 40 TABLET, FILM COATED ORAL at 21:53

## 2018-08-20 RX ADMIN — FLUTICASONE PROPIONATE SCH SPRAY: 50 SPRAY, METERED NASAL at 09:00

## 2018-08-20 RX ADMIN — METOPROLOL TARTRATE SCH: 50 TABLET, FILM COATED ORAL at 09:00

## 2018-08-20 RX ADMIN — Medication SCH ML: at 09:36

## 2018-08-20 RX ADMIN — ISOSORBIDE MONONITRATE SCH MG: 30 TABLET, EXTENDED RELEASE ORAL at 09:34

## 2018-08-20 RX ADMIN — SODIUM CHLORIDE SCH MLS: 9 INJECTION, SOLUTION INTRAVENOUS at 21:56

## 2018-08-20 RX ADMIN — HUMAN INSULIN SCH UNIT: 100 INJECTION, SOLUTION SUBCUTANEOUS at 13:08

## 2018-08-20 RX ADMIN — CITALOPRAM HYDROBROMIDE SCH MG: 10 TABLET ORAL at 21:53

## 2018-08-20 RX ADMIN — Medication SCH ML: at 21:56

## 2018-08-20 RX ADMIN — FUROSEMIDE SCH MG: 40 TABLET ORAL at 17:19

## 2018-08-20 RX ADMIN — HUMAN INSULIN SCH UNIT: 100 INJECTION, SOLUTION SUBCUTANEOUS at 17:20

## 2018-08-20 NOTE — ECHO
HEIGHT: 5 ft 7 in   WEIGHT: 172 lb 14.4 oz   DATE OF STUDY: 08/17/2018   REFER DR: 

2-DIMENSIONAL: YES

     M.MODE: YES

 DOPPLER: YES

COLOR FLOW: YES



                    TDS:  NO

PORTABLE: NO

 DEFINITY:  NO

BUBBLE STUDY: NO





DIAGNOSIS:  CONGESTIVE HEART FAILURE



CARDIAC HISTORY:  

CATHERIZATION: NO

SURGERY: NO

PROSTHETIC VALVE: NO

PACEMAKER: NO





MEASUREMENTS (cm)

    DIASTOLIC (NORMALS)                 SYSTOLIC (NORMALS)

IVSd                 1.3 (0.6-1.2)                    LA Diam 4.4 (1.9-4.0)     LVEF       
  53%  

LVIDd               3.5 (3.5-5.7)                        LVIDs      2.6 (2.0-3.5)     %FS  
        26%

LVPWd             1.4 (0.6-1.2)

Ao Diam           2.7 (2.0-3.7)



2 DIMENSIONAL ASSESSMENT:

RIGHT ATRIUM:                   NORMAL

LEFT ATRIUM:       DILATED



RIGHT VENTRICLE:            NORMAL

LEFT VENTRICLE: LEFT VENTRICULAR HYPERTROPHY



TRICUSPID VALVE:             NORMAL

MITRAL VALVE:     MITRAL ANNULAR CALCIFICATION



PULMONIC VALVE:             NORMAL

AORTIC VALVE:     SCLEROTIC



PERICARDIAL EFFUSION: NONE

AORTIC ROOT:      NORMAL





LEFT VENTRICULAR WALL MOTION:     DECREASED LEFT VENTRICULAR COMPLIANCE.



DOPPLER/COLOR FLOW:     MITRAL TRICUSPID REGURGITATION.



COMMENTS:      NORMAL EJECTION FRACTION. LEFT VENTRICULAR HYPERTROPHY. DECREASED LEFT 
VENTRICULAR COMPLIANCE. MITRAL ANNULAR CALCIFICATION. AORTIC SCLEROSIS. LEFT ATRIAL 
ENLARGEMENT. MILD TRICUSPID REGURGITATION.



TECHNOLOGIST:   YANI CONNORS RDCS

## 2018-08-20 NOTE — P.PN
Subjective


Date of Service: 08/20/18


Chief Complaint: Chest pain rule out acute coronary syndrome


Pt seen and examined with team. Chart reviewed. 


-Overnight no c/o offer 


-Today feels better than before. 


-Denies Fever, Chills, SOB or CP at this time 


-Awaiting HH set for IV abx   





Review of Systems


General: As per HPI





Physical Examination





- Vital Signs


Temperature: 97.3 F


Blood Pressure: 104/50


Pulse: 66


Respirations: 17


Pulse Ox (%): 94





- Physical Exam


General: Alert, In no apparent distress


HEENT: Atraumatic, PERRLA, EOMI


Neck: Supple, JVD not distended


Respiratory: Clear to auscultation bilaterally, Normal air movement


Cardiovascular: Regular rate/rhythm, Normal S1 S2


Gastrointestinal: Normal bowel sounds, No tenderness


Musculoskeletal: No tenderness


Integumentary: No rashes


Neurological: Normal speech, Normal tone, Normal affect


Lymphatics: No axilla or inguinal lymphadenopathy





- Studies


Medications List Reviewed: Yes





Assessment & Plan





- Problems (Diagnosis)


(1) Chest pain


Onset Date: 08/17/18   Current Visit: Yes   Status: Acute   


Plan: 


Atypical Chest Pain 


-Troponin x 2 negative. EKG with no acute changes 


-ACS medication - BB, ARB, ASA and statin


-Cardiology consulted. Appreciate Reccs


-ECHO With Diastolic Dysfunction 


   -Started on Lasix 40mg BID 





Qualifiers: 


   Chest pain type: other chest pain   Qualified Code(s): R07.89 - Other chest 

pain; R07.8 - Other chest pain   





(2) UTI (urinary tract infection)


Onset Date: 08/20/18   Current Visit: Yes   Status: Acute   


Plan: 


UA concern for UTI. Patient with Generalized weakness 


-Culture + for ESBL


-IV merrem 1g BID 


-PICC line and HH to be arranged for her IV abx for 2 weeks 








Qualifiers: 


   Urinary tract infection type: urethritis   Qualified Code(s): N34.2 - Other 

urethritis   





(3) ANA (acute kidney injury)


Current Visit: Yes   Status: Acute   


Plan: 


acute on chronic ANA 


-Slightly elevated Bun.CR 


-Piter continue to monitor 


-Nephrology consulted. Appreciate Reccs   








(4) HTN (hypertension)


Onset Date: 08/20/18   Current Visit: Yes   Status: Chronic   


Qualifiers: 


   Hypertension type: essential hypertension   Qualified Code(s): I10 - 

Essential (primary) hypertension   





(5) Diabetes


Onset Date: 08/20/18   Current Visit: Yes   Status: Chronic   


Qualifiers: 


   Diabetes mellitus type: type 2   Diabetes mellitus long term insulin use: 

with long term use   Diabetes mellitus complication status: without 

complication   Qualified Code(s): E11.9 - Type 2 diabetes mellitus without 

complications; Z79.4 - Long term (current) use of insulin   





(6) Hyperlipidemia


Onset Date: 08/20/18   Current Visit: Yes   Status: Chronic   


Qualifiers: 


   Hyperlipidemia type: mixed hyperlipidemia   Qualified Code(s): E78.2 - Mixed 

hyperlipidemia   


Discharge Plan: Home


Plan to discharge in: 24 Hours





- Code Status/Comfort Care


Code Status Assessed: Yes


Critical Care: No

## 2018-08-20 NOTE — PN
Date of Progress Note:  08/20/2018



Chief Complaint:  Chronic kidney disease stage 3, diabetic kidney disease.



Subjective:  The patient is on angiotensin receptor blocker for diabetic kidney disease.  The patient
 is admitted for chest pain.  She was found to have urinary tract infection.  She is on meropenem for
 multidrug resistant UTI with cystitis. 



The patient denies hematuria, dysuria.  Denies fever.  Denies chest pain today.



Physical Examination:

Lungs:  Clear to auscultation bilaterally. 

Heart:  S1 and S2. 

Abdomen:  Soft, benign. 

Extremities:  No edema.



Laboratory Data:  Sodium 138, potassium 4.6, chloride 102, CO2 29, BUN 35, creatinine 1.4, glucose 18
0, calcium 8.6, phosphorus 4.3.



Impression And Plan:  

1.Chronic kidney disease stage 3.  Renal function at stable range.  Monitor electrolytes and resume 
angiotensin receptor blocker.

2.Diabetes mellitus.  Continue insulin.

3.Urinary tract infection.  The patient is on antibiotics.  She will require treatment for 2 weeks w
ith antibiotic coverage according to sensitivities panel.





OLIVIA/ERIK

DD:  08/20/2018 10:52:08Voice ID:  [id number]

DT:  08/20/2018 15:38:19Report ID:  [id number]

## 2018-08-21 LAB
ALBUMIN SERPL BCP-MCNC: 3.4 G/DL (ref 3.4–5)
BUN BLD-MCNC: 42 MG/DL (ref 7–18)
GLUCOSE SERPLBLD-MCNC: 224 MG/DL (ref 74–106)
POTASSIUM SERPL-SCNC: 4.5 MMOL/L (ref 3.5–5.1)

## 2018-08-21 RX ADMIN — FLUTICASONE PROPIONATE SCH SPRAY: 50 SPRAY, METERED NASAL at 09:00

## 2018-08-21 RX ADMIN — SODIUM CHLORIDE SCH MLS: 9 INJECTION, SOLUTION INTRAVENOUS at 09:18

## 2018-08-21 RX ADMIN — HUMAN INSULIN SCH UNIT: 100 INJECTION, SOLUTION SUBCUTANEOUS at 12:14

## 2018-08-21 RX ADMIN — ASPIRIN SCH MG: 81 TABLET, COATED ORAL at 09:19

## 2018-08-21 RX ADMIN — CETIRIZINE HYDROCHLORIDE SCH MG: 5 TABLET, FILM COATED ORAL at 09:18

## 2018-08-21 RX ADMIN — Medication SCH ML: at 09:21

## 2018-08-21 RX ADMIN — PNEUMOCOCCAL VACCINE POLYVALENT ONE ML
25; 25; 25; 25; 25; 25; 25; 25; 25; 25; 25; 25; 25; 25; 25; 25; 25; 25; 25; 25; 25; 25; 25 INJECTION, SOLUTION INTRAMUSCULAR; SUBCUTANEOUS at 05:07

## 2018-08-21 RX ADMIN — HUMAN INSULIN SCH UNIT: 100 INJECTION, SOLUTION SUBCUTANEOUS at 09:17

## 2018-08-21 RX ADMIN — FUROSEMIDE SCH MG: 40 TABLET ORAL at 09:20

## 2018-08-21 RX ADMIN — LOSARTAN POTASSIUM SCH MG: 50 TABLET, FILM COATED ORAL at 09:19

## 2018-08-21 RX ADMIN — ISOSORBIDE MONONITRATE SCH MG: 30 TABLET, EXTENDED RELEASE ORAL at 09:19

## 2018-08-21 RX ADMIN — METOPROLOL TARTRATE SCH MG: 50 TABLET, FILM COATED ORAL at 09:19

## 2018-08-21 RX ADMIN — ENOXAPARIN SODIUM SCH MG: 40 INJECTION SUBCUTANEOUS at 09:18

## 2018-08-21 NOTE — P.DS
Admission Date: 08/18/18


Discharge Date: 08/21/18


Disposition: ROUTINE DISCHARGE


Discharge Condition: GOOD


Reason for Admission: Chest pain rule out acute coronary syndrome


Consultations: 





Cards   





- Problems


(1) Chest pain


Onset Date: 08/17/18   Status: Acute   


Qualifiers: 


   Chest pain type: other chest pain   Qualified Code(s): R07.89 - Other chest 

pain; R07.8 - Other chest pain   





(2) UTI (urinary tract infection)


Onset Date: 08/20/18   Status: Acute   


Qualifiers: 


   Urinary tract infection type: urethritis   Qualified Code(s): N34.2 - Other 

urethritis   





(3) ANA (acute kidney injury)


Status: Acute   





(4) HTN (hypertension)


Onset Date: 08/20/18   Status: Chronic   


Qualifiers: 


   Hypertension type: essential hypertension   Qualified Code(s): I10 - 

Essential (primary) hypertension   





(5) Diabetes


Onset Date: 08/20/18   Status: Chronic   


Qualifiers: 


   Diabetes mellitus type: type 2   Diabetes mellitus long term insulin use: 

with long term use   Diabetes mellitus complication status: without 

complication   Qualified Code(s): E11.9 - Type 2 diabetes mellitus without 

complications; Z79.4 - Long term (current) use of insulin   





(6) Hyperlipidemia


Onset Date: 08/20/18   Status: Chronic   


Qualifiers: 


   Hyperlipidemia type: mixed hyperlipidemia   Qualified Code(s): E78.2 - Mixed 

hyperlipidemia   


Brief History of Present Illness: 





Patient is a 82-year-old female came to the hospital chest discomfort.  Pain 

was mainly in the sternal region.  She states the pain started substernally.  

And then went around to the sternum.  There is no diaphoresis, shortness of 

breath, lightheadedness, nausea, or vomiting.  She described it as a pressure 

sensation.  She came into the hospital for evaluation.  In the emergency room 

she had serial troponins and EKG which are negative at this time.  She will be 

admitted to the hospital for further evaluation.


Hospital Course: 





Overall during the hospital stay patient remained stable





Patient was initially admitted to the hospital for chest pain was found to have 

acute on diastolic dysfunction.  Cardiology was consulted who recommended the 

patient be started on Lasix b.i.d. and get an echocardiogram done here in the 

hospital.  Patient had an echocardiogram done which had within normal limits of 

the ejection fraction however dilated atrium and ventricle was noted.  Patient 

had marked improvement in her symptoms chest pain had resolved shortness of 

breath has resolved after initiation of Lasix in this patient was switched over 

to p.o. Lasix twice daily.  Of note while patient was here in the hospital 

patient was found to have a urinary tract infection with the drug-resistant E. 

coli and was started on IV meropenem.  Home health was arranged for patient to 

receive IV meropenem at the house and thus was discharged home under stable 

condition.  Patient was asked to follow up with her primary care provider and 

cardiology in about 1-2 weeks post discharge.  Patient demonstrated 

understanding and thus was discharged home under stable condition.  Meropenem 

will be continued for total 14 days.


Vital Signs/Physical Exam: 














Temp Pulse Resp BP Pulse Ox


 


 97.8 F   54   12   126/58 L  99 


 


 08/21/18 12:00  08/21/18 12:00  08/21/18 12:00  08/21/18 12:00  08/21/18 12:00








General: Alert, In no apparent distress


HEENT: Atraumatic, PERRLA, EOMI


Neck: Supple, JVD not distended


Respiratory: Clear to auscultation bilaterally, Normal air movement


Cardiovascular: Regular rate/rhythm, Normal S1 S2


Gastrointestinal: Normal bowel sounds, No tenderness


Musculoskeletal: No tenderness


Integumentary: No rashes


Neurological: Normal speech, Normal tone, Normal affect


Lymphatics: No axilla or inguinal lymphadenopathy


Laboratory Data at Discharge: 














WBC  4.2 K/uL (4.3-10.9)  L  08/17/18  04:14    


 


Hgb  10.5 g/dL (12.0-15.0)  L  08/17/18  04:14    


 


Hct  29.4 % (36.0-45.0)  L  08/17/18  04:14    


 


Plt Count  102 K/uL (152-406)  L  08/17/18  04:14    


 


PT  11.2 SECONDS (9.5-12.5)   08/16/18  19:20    


 


INR  0.95   08/16/18  19:20    


 


APTT  43.2 SECONDS (24.3-36.9)  H  08/17/18  04:14    


 


Sodium  137 mmol/L (136-145)   08/21/18  04:41    


 


Potassium  4.5 mmol/L (3.5-5.1)   08/21/18  04:41    


 


BUN  42 mg/dL (7-18)  H  08/21/18  04:41    


 


Creatinine  1.50 mg/dL (0.55-1.3)  H  08/21/18  04:41    


 


Glucose  224 mg/dL ()  H  08/21/18  04:41    


 


Phosphorus  4.3 mg/dL (2.5-4.9)   08/21/18  04:41    


 


Magnesium  2.2 mg/dL (1.8-2.4)   08/16/18  19:20    


 


Total Bilirubin  0.4 mg/dL (0.2-1.0)   08/16/18  19:20    


 


AST  23 U/L (15-37)   08/16/18  19:20    


 


ALT  26 U/L (12-78)   08/16/18  19:20    


 


Alkaline Phosphatase  72 U/L ()   08/16/18  19:20    


 


Troponin I  < 0.02 ng/mL (0.0-0.045)   08/17/18  00:09    


 


Triglycerides  395 mg/dL (<150)  H  08/17/18  04:14    


 


Cholesterol  175 mg/dL (<200)   08/17/18  04:14    


 


HDL Cholesterol  29 mg/dL (40-60)  L  08/17/18  04:14    


 


Cholesterol/HDL Ratio  6.03   08/17/18  04:14    


 


Lipase  90 U/L ()   08/16/18  19:20    








Home Medications: 








Aspirin [Aspirin EC 81 MG] 81 mg PO DAILY 08/16/18 


Cetirizine HCl [Zyrtec] 10 mg PO DAILY 08/16/18 


Citalopram Hydrobromide [Citalopram HBr] 10 mg PO BEDTIME 08/16/18 


Fluticasone [Flonase 50MCG Nasal Spray*] 1 spray NS TID PRN 08/16/18 


Insulin Aspart [Novolog Flexpen] 14 units SQ SEECOM 08/16/18 


Insulin Detemir [Levemir Flextouch] 65 unit SQ BEDTIME 08/16/18 


Isosorbide Mononitrate [Isosorbide Mononitrate ER] 30 mg PO DAILY 08/16/18 


Losartan Potassium [Cozaar*] 25 mg PO DAILY 08/16/18 


Simvastatin 80 mg PO BEDTIME 08/16/18 


Tramadol HCl [Ultram] 50 mg PO Q6HR PRN 08/16/18 


Furosemide [Lasix*] 40 mg PO BIDL #60 tab 08/21/18 


Meropenem [Merrem 1 GM/100 ML NS IVPB] 1 gm IV BID #28 bag 08/21/18 


Metoprolol Tartrate [Lopressor*] 50 mg PO BID #60 tab 08/21/18 





New Medications: 


Furosemide [Lasix*] 40 mg PO BIDL #60 tab


Meropenem [Merrem 1 GM/100 ML NS IVPB] 1 gm IV BID #28 bag


Metoprolol Tartrate [Lopressor*] 50 mg PO BID #60 tab


Patient Discharge Instructions: Please f.u with PCP in 1 to 2 weeks post 

discharge.  Please f.u with Cardioloyg in 1 to 2 week post discharge.  New 

medication.  Lasix 40mg BID.  Metoprolol 50mg BID


Diet: Regular


Activity: Ad uh


Followup: 


James Velasquez MD [ACTIVE - CAN ADMIT] - 1 Week


(2 weeks, 


take Lasix 40mg BID )

## 2019-08-26 ENCOUNTER — HOSPITAL ENCOUNTER (EMERGENCY)
Dept: HOSPITAL 97 - ER | Age: 84
Discharge: HOME | End: 2019-08-26
Payer: COMMERCIAL

## 2019-08-26 DIAGNOSIS — E11.22: ICD-10-CM
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
## 2019-08-26 DIAGNOSIS — F32.9: ICD-10-CM

## 2019-08-26 DIAGNOSIS — S32.010A: Primary | ICD-10-CM

## 2019-08-26 DIAGNOSIS — I12.9: ICD-10-CM

## 2019-08-26 DIAGNOSIS — Z79.82: ICD-10-CM

## 2019-08-26 DIAGNOSIS — Z79.4: ICD-10-CM
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
## 2019-08-26 DIAGNOSIS — Z23: ICD-10-CM

## 2019-08-26 DIAGNOSIS — N18.3: ICD-10-CM

## 2019-08-26 LAB
BLD SMEAR INTERP: (no result)
BUN BLD-MCNC: 33 MG/DL (ref 7–18)
GLUCOSE SERPLBLD-MCNC: 158 MG/DL (ref 74–106)
HCT VFR BLD CALC: 27.3 % (ref 36–45)
LYMPHOCYTES # SPEC AUTO: 1 K/UL (ref 0.7–4.9)
MORPHOLOGY BLD-IMP: (no result)
PMV BLD: 10.6 FL (ref 7.6–11.3)
POTASSIUM SERPL-SCNC: 5.1 MMOL/L (ref 3.5–5.1)
RBC # BLD: 2.83 M/UL (ref 3.86–4.86)

## 2019-08-26 PROCEDURE — 36415 COLL VENOUS BLD VENIPUNCTURE: CPT

## 2019-08-26 PROCEDURE — 85025 COMPLETE CBC W/AUTO DIFF WBC: CPT

## 2019-08-26 PROCEDURE — 74176 CT ABD & PELVIS W/O CONTRAST: CPT

## 2019-08-26 PROCEDURE — 93005 ELECTROCARDIOGRAM TRACING: CPT

## 2019-08-26 PROCEDURE — 99284 EMERGENCY DEPT VISIT MOD MDM: CPT

## 2019-08-26 PROCEDURE — 80048 BASIC METABOLIC PNL TOTAL CA: CPT

## 2019-08-26 PROCEDURE — 90471 IMMUNIZATION ADMIN: CPT

## 2019-08-26 PROCEDURE — 70450 CT HEAD/BRAIN W/O DYE: CPT

## 2019-08-26 PROCEDURE — 90714 TD VACC NO PRESV 7 YRS+ IM: CPT

## 2019-08-26 NOTE — XMS REPORT
[facility]

 Created on:2019



Patient:Cecilia Lancaster

Sex:Female

:1935

External Reference #:[id number]





Demographics







 Address  [address] [place] [address]

 

 Phone  [phone]

 

 Preferred Language  es

 

 Marital Status  Unknown

 

 Catholic Affiliation  Unknown

 

 Race  

 

 Ethnic Group  Unknown









Author







 Organization  [employer]









Care Team Providers







 Name  Role  Phone

 

 Virginia Salazar  Provider Role  Unavailable









Allergies

No Known Allergies



Problems







 Problem Type  Condition  Code  Onset Dates  Condition Status

 

 Assessment  Urinary tract infection without  N39.0    Active



   hematuria, site unspecified      

 

 Problem  DJD (degenerative joint disease),  M15.9    Active



   multiple sites      

 

 Problem  DDD (degenerative disc disease),  M51.37    Active



   lumbosacral      

 

 Problem  Trigger finger  M65.30    Active

 

 Problem  Arteriosclerosis of coronary artery  I25.10    Active

 

 Problem  Hyperlipidemia, mixed  E78.2    Active

 

 Problem  Anemia in chronic illness  D63.8    Active

 

 Problem  Diverticulosis of colon  K57.30    Active

 

 Problem  Benign essential HTN  I10    Active

 

 Problem  Primary osteoarthritis of both hips  M16.0    Active

 

 Problem  Decreased hearing, unspecified  H91.90    Active



   laterality      

 

 Problem  Vitamin B12 deficiency  E53.8    Active

 

 Problem  Fatty liver  K76.0    Active

 

 Problem  Posterior auricular lymphadenopathy  R59.0    Active

 

 Problem  Hyperglycemia  R73.9    Active

 

 Problem  Unsteady gait  R26.81    Active

 

 Problem  Seasonal allergies  J30.2    Active

 

 Problem  GERD (gastroesophageal reflux  K21.9    Active



   disease)      

 

 Problem  Osteoporosis  M81.0    Active

 

 Problem  Malignant neoplasm of urinary  C67.9    Active



   bladder, unspecified site      

 

 Problem  Diabetes  E11.9    Active

 

 Problem  Urinary tract infection without  N39.0    Active



   hematuria, site unspecified      

 

 Problem  Right upper quadrant abdominal pain  R10.11    Active

 

 Problem  Acute on chronic diastolic heart  I50.33    Active



   failure      

 

 Problem  Mixed hyperlipidemia  E78.2    Active

 

 Problem  Arthritis  M19.90    Active

 

 Problem  Depression with anxiety  F41.8    Active

 

 Problem  CKD (chronic kidney disease) stage  N18.3    Active



   3, GFR 30-59 ml/min      

 

 Problem  Thrombocytopenia  D69.6    Active

 

 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



   conditions      

 

 Problem  Type 2 diabetes mellitus without  E11.9    Active



   complications      

 

 Problem  Long term current use of insulin  Z79.4    Active

 

 Problem  Chronic renal disease  N18.9    Active







Medications

No Known Medications



Results

No Known Results



Summary Purpose

[employer] Submission

## 2019-08-26 NOTE — XMS REPORT
[facility]

 Created on:2019



Patient:Cecilia Lancaster

Sex:Female

:1935

External Reference #:[id number]





Demographics







 Address  [address] [place] [address]

 

 Phone  [phone]

 

 Preferred Language  es

 

 Marital Status  Unknown

 

 Rastafarian Affiliation  Unknown

 

 Race  

 

 Ethnic Group  Unknown









Author







 Organization  [employer]









Care Team Providers







 Name  Role  Phone

 

 Lopez, Na  Provider Role  Unavailable









Allergies, Adverse Reactions, Alerts







 Substance  Reaction  Event Type

 

 N.K.D.A.  Info Not Available  Non Drug Allergy







Problems







 Problem Type  Condition  Code  Onset Dates  Condition Status

 

 Assessment  Type 2 diabetes mellitus without  E11.9    Active



   complications      

 

 Assessment  Cervicalgia  M54.2    Active

 

 Problem  DDD (degenerative disc disease),  M51.37    Active



   lumbosacral      

 

 Problem  DJD (degenerative joint disease),  M15.9    Active



   multiple sites      

 

 Problem  Trigger finger  M65.30    Active

 

 Problem  Arteriosclerosis of coronary artery  I25.10    Active

 

 Problem  Hyperlipidemia, mixed  E78.2    Active

 

 Problem  Anemia in chronic illness  D63.8    Active

 

 Problem  Diverticulosis of colon  K57.30    Active

 

 Problem  Type 2 diabetes mellitus without  E11.9    Active



   complications      

 

 Problem  Primary osteoarthritis of both hips  M16.0    Active

 

 Problem  Benign essential HTN  I10    Active

 

 Problem  Decreased hearing, unspecified  H91.90    Active



   laterality      

 

 Problem  Hyperglycemia  R73.9    Active

 

 Problem  Fatty liver  K76.0    Active

 

 Problem  Seasonal allergies  J30.2    Active

 

 Problem  Acute on chronic diastolic heart  I50.33    Active



   failure      

 

 Problem  Osteoporosis  M81.0    Active

 

 Problem  Diabetes  E11.9    Active

 

 Assessment  Anemia in chronic illness  D63.8    Active

 

 Problem  Unsteady gait  R26.81    Active

 

 Problem  Vitamin B12 deficiency  E53.8    Active

 

 Problem  Right upper quadrant abdominal pain  R10.11    Active

 

 Problem  Posterior auricular lymphadenopathy  R59.0    Active

 

 Problem  Mixed hyperlipidemia  E78.2    Active

 

 Problem  Urinary tract infection without  N39.0    Active



   hematuria, site unspecified      

 

 Assessment  Mixed hyperlipidemia  E78.2    Active

 

 Problem  Thrombocytopenia  D69.6    Active

 

 Assessment  Acute on chronic diastolic heart  I50.33    Active



   failure      

 

 Problem  Arthritis  M19.90    Active

 

 Assessment  CKD (chronic kidney disease) stage  N18.3    Active



   3, GFR 30-59 ml/min      

 

 Problem  GERD (gastroesophageal reflux  K21.9    Active



   disease)      

 

 Assessment  Fatty liver  K76.0    Active

 

 Problem  CKD (chronic kidney disease) stage  N18.3    Active



   3, GFR 30-59 ml/min      

 

 Assessment  Thrombocytopenia  D69.6    Active

 

 Problem  Chronic renal disease  N18.9    Active

 

 Assessment  Long term current use of insulin  Z79.4    Active

 

 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



   conditions      

 

 Problem  Depression with anxiety  F41.8    Active

 

 Problem  Long term current use of insulin  Z79.4    Active







Medications







 Medication  Code  Code  Instructions  Start  End Date  Status  Dosage



   System      Date      

 

 BD Pen Needle  NDC  [id number]  31G X 8 MM      Active  USE WITH



 Short U/F              PEN 2 TIMES



               A DAY

 

 Tizanidine HCl  NDC  76554470087  2 MG Orally at    Active  1 
tablet as



       bedtime  2019    needed

 

 NovoLog Flexpen  ND  [id number]  100 UNIT/ML      Active  as directed



       Subcutaneous 5        



       units before        



       lunch and        



       dinner        

 

 Metoprolol  NDC  95531553216  25 MG Orally      Active  1 tablet



 Succinate ER      Once a day        

 

 Levemir  ND  [id number]  100 UNIT/ML  May 08,    Active  50 units



       Subcutaneous  2018      



       once daily        

 

 Tradjenta  NDC  90763043559  5 MG      Active  TOME CARLOS



               TABLETA



               



               PADRON

 

 Macrobid  NDC  [id number]  100 MG Orally  ,    Active  1 capsule



       every 12 hrs  2018      with food

 

 Cetirizine HCl  NDC  30257563666  10 MG Orally  Dec 27,    Active  1 tablet



       Once a day  2018      

 

 NovoFine Plus  NDC  [id number]  32G X 4 MM SC      Active  as directed



       twice daily        

 

 Simvastatin  ND  91788604616  80 MG Orally      Active  1 tablet in



       Once a day        the evening

 

 Promethazine  NDC  04961828657  25 MG Orally      Active  1 tablet as



 HCl      every 6 hrs        needed

 

 Furosemide  NDC  42999824579  40 MG Orally      Active  1 tablet



       Once a day        

 

 Citalopram  NDC  15075424063  10 MG      Active  TOME CARLOS



 Hydrobromide              TABLETA POR



               VIA ORAL



               ONCE A DAY

 

 Augmentin  NDC  31128375526  875-125 MG  Oct 30,    Active  1 tablet



       Orally every 12  2018      



       hrs        

 

 Flonase  ND  44925378725  50 MCG/ACT      Active  1 spray in



       Nasally Once a        each



       day        nostril

 

 Aspir-81  NDC  74908376243  81 MG Orally      Active  1 tablet



       Once a day        

 

 Omeprazole  NDC  84850855444  40 MG Orally      Active  1 capsule



       Once a day        

 

 Zocor  NDC  36959867330  80 MG      Active  TOME CARLOS



               TABLETA



               TODOS LOS



               PADRON

 

 Zantac  NDC  83415512735  150 MG      Active  TOME CARLOS



               TABLETA POR



               VIA ORAL



               DOS VECES



               AL JAN

 

 Toprol XL  NDC  62199356340  25 MG      Active  TAKE 1



               TABLET BY



               MOUTH DAILY

 

 NovoFine Plus  NDC  [id number]  32G X 4 MM SC      Active  as directed



       twice daily        

 

 Cozaar  NDC  53416175242  25 MG Orally      Active  1 tablet



       Once a day        

 

 Macrobid  NDC  92792043579  100 MG Orally  ,    Active  1 capsule



       every 12 hrs        with food







Results

No Known Results



Summary Purpose

[employer] Submission

## 2019-08-26 NOTE — RAD REPORT
EXAM DESCRIPTION:  Shoulder  Right 2 View - 8/26/2019 11:38 am

 

CLINICAL HISTORY:  Fall, right shoulder pain

 

COMPARISON:  None.

 

TECHNIQUE:  Internal and external rotation views of the right shoulder were obtained.

 

FINDINGS:  Two views the right shoulder were obtained both essentially internal rotation. No dislocat
ion or fracture of the proximal humerus seen. No significant AC joint degenerative change present. No
 AC joint separation. Degenerative change present along the undersurface of the acromion.  Acromial h
umeral joint space is normal with no abnormal soft tissue calcifications. No acute or destructive pro
cess.

 

IMPRESSION:  Mild for age degenerative changes are present. No fracture or acute finding.

## 2019-08-26 NOTE — XMS REPORT
[facility]

 Created on:May 15, 2019



Patient:Cecilia Lancaster

Sex:Female

:1935

External Reference #:[id number]





Demographics







 Address  [address] [place] [address]

 

 Phone  [phone]

 

 Preferred Language  es

 

 Marital Status  Unknown

 

 Caodaism Affiliation  Unknown

 

 Race  

 

 Ethnic Group  Unknown









Author







 Organization  [employer]









Care Team Providers







 Name  Role  Phone

 

 Lopez, Na  Provider Role  Unavailable









Allergies

No Known Allergies



Problems







 Problem Type  Condition  Code  Onset Dates  Condition Status

 

 Problem  DJD (degenerative joint disease),  M15.9    Active



   multiple sites      

 

 Problem  DDD (degenerative disc disease),  M51.37    Active



   lumbosacral      

 

 Problem  Trigger finger  M65.30    Active

 

 Problem  Arteriosclerosis of coronary artery  I25.10    Active

 

 Problem  Hyperlipidemia, mixed  E78.2    Active

 

 Problem  Anemia in chronic illness  D63.8    Active

 

 Problem  Diverticulosis of colon  K57.30    Active

 

 Problem  Benign essential HTN  I10    Active

 

 Problem  Primary osteoarthritis of both hips  M16.0    Active

 

 Problem  Decreased hearing, unspecified  H91.90    Active



   laterality      

 

 Problem  Vitamin B12 deficiency  E53.8    Active

 

 Problem  Fatty liver  K76.0    Active

 

 Problem  Posterior auricular lymphadenopathy  R59.0    Active

 

 Problem  Hyperglycemia  R73.9    Active

 

 Problem  Unsteady gait  R26.81    Active

 

 Problem  Seasonal allergies  J30.2    Active

 

 Problem  GERD (gastroesophageal reflux  K21.9    Active



   disease)      

 

 Problem  Osteoporosis  M81.0    Active

 

 Problem  Malignant neoplasm of urinary  C67.9    Active



   bladder, unspecified site      

 

 Problem  Diabetes  E11.9    Active

 

 Problem  Urinary tract infection without  N39.0    Active



   hematuria, site unspecified      

 

 Problem  Right upper quadrant abdominal pain  R10.11    Active

 

 Problem  Acute on chronic diastolic heart  I50.33    Active



   failure      

 

 Problem  Mixed hyperlipidemia  E78.2    Active

 

 Problem  Arthritis  M19.90    Active

 

 Problem  Depression with anxiety  F41.8    Active

 

 Problem  CKD (chronic kidney disease) stage  N18.3    Active



   3, GFR 30-59 ml/min      

 

 Problem  Thrombocytopenia  D69.6    Active

 

 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



   conditions      

 

 Problem  Type 2 diabetes mellitus without  E11.9    Active



   complications      

 

 Problem  Long term current use of insulin  Z79.4    Active

 

 Problem  Chronic renal disease  N18.9    Active







Medications







 Medication  Code  Code  Instructions  Start  End  Status  Dosage



   System      Date  Date    

 

 Levemir  NDC  [id number]  100 UNIT/ML  May 15,    Active  as directed



 FlexTouch      Subcutaneous 50  2019      



       units once a day        







Results

No Known Results



Summary Purpose

[employer] Submission

## 2019-08-26 NOTE — XMS REPORT
[facility]

 Created on:2018



Patient:Cecilia Lancaster

Sex:Female

:1935

External Reference #:[id number]





Demographics







 Address  [address], [place] [address]

 

 Phone  [phone]

 

 Preferred Language  es

 

 Marital Status  Unknown

 

 Alevism Affiliation  Unknown

 

 Race  

 

 Ethnic Group  Unknown









Author







 Organization  [employer]









Care Team Providers







 Name  Role  Phone

 

 Lopez, Na  Provider Role  Unavailable









Allergies, Adverse Reactions, Alerts







 Substance  Reaction  Event Type

 

 N.K.D.A.  Info Not Available  Non Drug Allergy







Problems







 Problem Type  Condition  Code  Onset Dates  Condition Status

 

 Assessment  Type 2 diabetes mellitus without  E11.9    Active



   complications      

 

 Assessment  Mixed hyperlipidemia  E78.2    Active

 

 Assessment  Hospital discharge follow-up  Z09    Active

 

 Assessment  Acute on chronic diastolic heart  I50.33    Active



   failure      

 

 Problem  DDD (degenerative disc disease),  M51.37    Active



   lumbosacral      

 

 Problem  DJD (degenerative joint disease),  M15.9    Active



   multiple sites      

 

 Problem  Trigger finger  M65.30    Active

 

 Problem  Arteriosclerosis of coronary artery  I25.10    Active

 

 Problem  Type 2 diabetes mellitus without  E11.9    Active



   complications      

 

 Problem  Hyperlipidemia, mixed  E78.2    Active

 

 Problem  Long term current use of insulin  Z79.4    Active

 

 Problem  Anemia in chronic illness  D63.8    Active

 

 Problem  Chronic renal disease  N18.9    Active

 

 Problem  Decreased hearing, unspecified  H91.90    Active



   laterality      

 

 Problem  Primary osteoarthritis of both hips  M16.0    Active

 

 Problem  Acute on chronic diastolic heart  I50.33    Active



   failure      

 

 Problem  Urinary tract infection without  N39.0    Active



   hematuria, site unspecified      

 

 Problem  Vitamin B12 deficiency  E53.8    Active

 

 Problem  Benign essential HTN  I10    Active

 

 Problem  Mixed hyperlipidemia  E78.2    Active

 

 Problem  Diverticulosis of colon  K57.30    Active

 

 Problem  Hyperglycemia  R73.9    Active

 

 Problem  Fatty liver  K76.0    Active

 

 Problem  Posterior auricular lymphadenopathy  R59.0    Active

 

 Problem  Right upper quadrant abdominal pain  R10.11    Active

 

 Assessment  Long term current use of insulin  Z79.4    Active

 

 Problem  GERD (gastroesophageal reflux  K21.9    Active



   disease)      

 

 Assessment  Fatty liver  K76.0    Active

 

 Problem  Depression with anxiety  F41.8    Active

 

 Assessment  Posterior auricular lymphadenopathy  R59.0    Active

 

 Problem  Diabetes  E11.9    Active

 

 Assessment  CKD (chronic kidney disease) stage  N18.3    Active



   3, GFR 30-59 ml/min      

 

 Problem  Osteoporosis  M81.0    Active

 

 Problem  Arthritis  M19.90    Active

 

 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



   conditions      

 

 Problem  CKD (chronic kidney disease) stage  N18.3    Active



   3, GFR 30-59 ml/min      

 

 Problem  Thrombocytopenia  D69.6    Active







Medications







 Medication  Code  Code  Instructions  Start  End  Status  Dosage



   System      Date  Date    

 

 NovoFine Plus  NDC  [id number]  32G X 4 MM SC      Active  as directed



       twice daily        

 

 Zocor  NDC  [id number]  80 MG      Active  TOME CARLOS



               TABLETA



               TODOS LOS



               PADRON

 

 Toprol XL  NDC  [id number]  25 MG      Active  TAKE 1



               TABLET BY



               MOUTH DAILY

 

 Citalopram  NDC  [id number]  10 MG      Active  TOME CARLOS



 Hydrobromide              TABLETA POR



               VIA ORAL



               ONCE A DAY

 

 Flonase  ND  [id number]  50 MCG/ACT      Active  1 spray in



       Nasally Once a        each



       day        nostril

 

 Macrobid  NDC  [id number]  100 MG Orally  ,    Active  1 capsule



       every 12 hrs        with food

 

 Levemir  ND  [id number]  100 UNIT/ML  May 08,    Active  50 units



       Subcutaneous  2018      



       once daily        

 

 Cozaar  NDC  [id number]  25 MG Orally      Active  1 tablet



       Once a day        

 

 Simvastatin  NDC  59958943739  80 MG Orally      Active  1 tablet in



       Once a day        the evening

 

 Promethazine HCl  NDC  87602578713  25 MG Orally      Active  1 tablet as



       every 6 hrs        needed

 

 NovoLog Flexpen  ND  [id number]  100 UNIT/ML      Active  as directed



       Subcutaneous 5        



       units before        



       lunch and        



       dinner        

 

 Omeprazole  NDC  [id number]  40 MG Orally      Active  1 capsule



       Once a day        

 

 Metoprolol  NDC  [id number]  25 MG Orally      Active  1 tablet



 Succinate ER      Once a day        

 

 Zantac  NDC  [id number]  150 MG      Active  TOME CARLOS



               TABLETA POR



               VIA ORAL



               DOS VECES



               AL JAN

 

 Tradjenta  NDC  [id number]  5 MG      Active  TOME CARLOS



               TABLETA



               TODOS LOS



               PADRON

 

 Aspir-81  NDC  [id number]  81 MG Orally      Active  1 tablet



       Once a day        







Results

No Known Results



Summary Purpose

[employer]ClinicalWorks Submission

## 2019-08-26 NOTE — XMS REPORT
[facility]

 Created on:2019



Patient:Cecilia Lancaster

Sex:Female

:1935

External Reference #:[id number]





Demographics







 Address  [address] [place] [address]

 

 Phone  [phone]

 

 Preferred Language  es

 

 Marital Status  Unknown

 

 Druze Affiliation  Unknown

 

 Race  

 

 Ethnic Group  Unknown









Author







 Organization  [employer]









Care Team Providers







 Name  Role  Phone

 

 Lopez, Na  Provider Role  Unavailable









Allergies

No Known Allergies



Problems







 Problem Type  Condition  Code  Onset Dates  Condition Status

 

 Problem  DDD (degenerative disc disease),  M51.37    Active



   lumbosacral      

 

 Problem  DJD (degenerative joint disease),  M15.9    Active



   multiple sites      

 

 Problem  Trigger finger  M65.30    Active

 

 Problem  Arteriosclerosis of coronary artery  I25.10    Active

 

 Problem  Hyperlipidemia, mixed  E78.2    Active

 

 Problem  Anemia in chronic illness  D63.8    Active

 

 Problem  Diverticulosis of colon  K57.30    Active

 

 Problem  Type 2 diabetes mellitus without  E11.9    Active



   complications      

 

 Problem  Primary osteoarthritis of both hips  M16.0    Active

 

 Problem  Benign essential HTN  I10    Active

 

 Problem  Decreased hearing, unspecified  H91.90    Active



   laterality      

 

 Problem  Hyperglycemia  R73.9    Active

 

 Problem  Fatty liver  K76.0    Active

 

 Problem  Seasonal allergies  J30.2    Active

 

 Problem  Acute on chronic diastolic heart  I50.33    Active



   failure      

 

 Problem  Osteoporosis  M81.0    Active

 

 Problem  Diabetes  E11.9    Active

 

 Problem  Unsteady gait  R26.81    Active

 

 Problem  Vitamin B12 deficiency  E53.8    Active

 

 Problem  Right upper quadrant abdominal pain  R10.11    Active

 

 Problem  Posterior auricular lymphadenopathy  R59.0    Active

 

 Problem  Mixed hyperlipidemia  E78.2    Active

 

 Problem  Urinary tract infection without  N39.0    Active



   hematuria, site unspecified      

 

 Problem  Thrombocytopenia  D69.6    Active

 

 Problem  Arthritis  M19.90    Active

 

 Problem  GERD (gastroesophageal reflux  K21.9    Active



   disease)      

 

 Problem  CKD (chronic kidney disease) stage  N18.3    Active



   3, GFR 30-59 ml/min      

 

 Problem  Chronic renal disease  N18.9    Active

 

 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



   conditions      

 

 Problem  Depression with anxiety  F41.8    Active

 

 Problem  Long term current use of insulin  Z79.4    Active







Medications

No Known Medications



Results

No Known Results



Summary Purpose

[employer]Clinical[employer] Submission

## 2019-08-26 NOTE — XMS REPORT
[facility]

 Created on:2019



Patient:Cecilia Lancaster

Sex:Female

:1935

External Reference #:[id number]





Demographics







 Address  [address] [place] [address]

 

 Phone  [phone]

 

 Preferred Language  es

 

 Marital Status  Unknown

 

 Muslim Affiliation  Unknown

 

 Race  

 

 Ethnic Group  Unknown









Author







 Organization  [employer]









Care Team Providers







 Name  Role  Phone

 

 Lopez, Na  Provider Role  Unavailable









Allergies

No Known Allergies



Problems







 Problem Type  Condition  Code  Onset Dates  Condition Status

 

 Problem  DDD (degenerative disc disease),  M51.37    Active



   lumbosacral      

 

 Problem  Trigger finger  M65.30    Active

 

 Problem  DJD (degenerative joint disease),  M15.9    Active



   multiple sites      

 

 Problem  Arteriosclerosis of coronary artery  I25.10    Active

 

 Problem  Hyperlipidemia, mixed  E78.2    Active

 

 Problem  Anemia in chronic illness  D63.8    Active

 

 Problem  Diverticulosis of colon  K57.30    Active

 

 Problem  Benign essential HTN  I10    Active

 

 Problem  Vitamin B12 deficiency  E53.8    Active

 

 Problem  Decreased hearing, unspecified  H91.90    Active



   laterality      

 

 Problem  Fatty liver  K76.0    Active

 

 Problem  Diabetes  E11.9    Active

 

 Problem  Hyperglycemia  R73.9    Active

 

 Problem  Right upper quadrant abdominal pain  R10.11    Active

 

 Problem  Posterior auricular lymphadenopathy  R59.0    Active

 

 Problem  Malignant neoplasm of urinary  C67.9    Active



   bladder, unspecified site      

 

 Problem  Unsteady gait  R26.81    Active

 

 Problem  CKD (chronic kidney disease) stage  N18.3    Active



   3, GFR 30-59 ml/min      

 

 Problem  GERD (gastroesophageal reflux  K21.9    Active



   disease)      

 

 Problem  Leukocytopenia, unspecified  D72.819    Active

 

 Problem  Osteoporosis  M81.0    Active

 

 Problem  Mixed hyperlipidemia  E78.2    Active

 

 Problem  Urinary tract infection without  N39.0    Active



   hematuria, site unspecified      

 

 Problem  Seasonal allergies  J30.2    Active

 

 Problem  Acute on chronic diastolic heart  I50.33    Active



   failure      

 

 Problem  Depression with anxiety  F41.8    Active

 

 Problem  Long term current use of insulin  Z79.4    Active

 

 Problem  Thrombocytopenia  D69.6    Active

 

 Problem  Arthritis  M19.90    Active

 

 Problem  Type 2 diabetes mellitus without  E11.9    Active



   complications      

 

 Problem  Primary osteoarthritis of both hips  M16.0    Active

 

 Problem  Chronic renal disease  N18.9    Active

 

 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



   conditions      







Medications

No Known Medications



Results

No Known Results



Summary Purpose

eClinicalWorks Submission

## 2019-08-26 NOTE — XMS REPORT
[facility]

 Created on:2019



Patient:Cecilia Lancaster

Sex:Female

:1935

External Reference #:[id number]





Demographics







 Address  [address] [place] [address]

 

 Phone  [phone]

 

 Preferred Language  es

 

 Marital Status  Unknown

 

 Mormon Affiliation  Unknown

 

 Race  

 

 Ethnic Group  Unknown









Author







 Organization  [employer]









Care Team Providers







 Name  Role  Phone

 

 Lopez, Na  Provider Role  Unavailable









Allergies

No Known Allergies



Problems







 Problem Type  Condition  Code  Onset Dates  Condition Status

 

 Problem  DDD (degenerative disc disease),  M51.37    Active



   lumbosacral      

 

 Problem  DJD (degenerative joint disease),  M15.9    Active



   multiple sites      

 

 Problem  Trigger finger  M65.30    Active

 

 Problem  Arteriosclerosis of coronary artery  I25.10    Active

 

 Problem  Hyperlipidemia, mixed  E78.2    Active

 

 Problem  Anemia in chronic illness  D63.8    Active

 

 Problem  Diverticulosis of colon  K57.30    Active

 

 Problem  Type 2 diabetes mellitus without  E11.9    Active



   complications      

 

 Problem  Primary osteoarthritis of both hips  M16.0    Active

 

 Problem  Benign essential HTN  I10    Active

 

 Problem  Decreased hearing, unspecified  H91.90    Active



   laterality      

 

 Problem  Hyperglycemia  R73.9    Active

 

 Problem  Fatty liver  K76.0    Active

 

 Problem  Seasonal allergies  J30.2    Active

 

 Problem  Acute on chronic diastolic heart  I50.33    Active



   failure      

 

 Problem  Osteoporosis  M81.0    Active

 

 Problem  Diabetes  E11.9    Active

 

 Problem  Unsteady gait  R26.81    Active

 

 Problem  Vitamin B12 deficiency  E53.8    Active

 

 Problem  Right upper quadrant abdominal pain  R10.11    Active

 

 Problem  Posterior auricular lymphadenopathy  R59.0    Active

 

 Problem  Mixed hyperlipidemia  E78.2    Active

 

 Problem  Urinary tract infection without  N39.0    Active



   hematuria, site unspecified      

 

 Problem  Thrombocytopenia  D69.6    Active

 

 Problem  Arthritis  M19.90    Active

 

 Problem  GERD (gastroesophageal reflux  K21.9    Active



   disease)      

 

 Problem  CKD (chronic kidney disease) stage  N18.3    Active



   3, GFR 30-59 ml/min      

 

 Problem  Chronic renal disease  N18.9    Active

 

 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



   conditions      

 

 Problem  Depression with anxiety  F41.8    Active

 

 Problem  Long term current use of insulin  Z79.4    Active







Medications







 Medication  Code System  Code  Instructions  Start Date  End Date  Status  
Dosage

 

 Zantac  NDC  [id number]  150 MG      Active  TOME CARLOS



               TABLETA POR



               VIA ORAL DOS



               VECES AL JAN







Results

No Known Results



Summary Purpose

eClinicalWorks Submission

## 2019-08-26 NOTE — XMS REPORT
[facility]

 Created on:February 15, 2019



Patient:Cecilia Lancaster

Sex:Female

:1935

External Reference #:[id number]





Demographics







 Address  [address] [place] [address]

 

 Phone  [phone]

 

 Preferred Language  es

 

 Marital Status  Unknown

 

 Mormon Affiliation  Unknown

 

 Race  

 

 Ethnic Group  Unknown









Author







 Organization  [employer]









Care Team Providers







 Name  Role  Phone

 

 Lopez, Na  Provider Role  Unavailable









Allergies

No Known Allergies



Problems







 Problem Type  Condition  Code  Onset Dates  Condition Status

 

 Problem  DDD (degenerative disc disease),  M51.37    Active



   lumbosacral      

 

 Problem  DJD (degenerative joint disease),  M15.9    Active



   multiple sites      

 

 Problem  Trigger finger  M65.30    Active

 

 Problem  Arteriosclerosis of coronary artery  I25.10    Active

 

 Problem  Hyperlipidemia, mixed  E78.2    Active

 

 Problem  Anemia in chronic illness  D63.8    Active

 

 Problem  Diverticulosis of colon  K57.30    Active

 

 Problem  Type 2 diabetes mellitus without  E11.9    Active



   complications      

 

 Problem  Primary osteoarthritis of both hips  M16.0    Active

 

 Problem  Benign essential HTN  I10    Active

 

 Problem  Decreased hearing, unspecified  H91.90    Active



   laterality      

 

 Problem  Hyperglycemia  R73.9    Active

 

 Problem  Fatty liver  K76.0    Active

 

 Problem  Seasonal allergies  J30.2    Active

 

 Problem  Acute on chronic diastolic heart  I50.33    Active



   failure      

 

 Problem  Osteoporosis  M81.0    Active

 

 Problem  Diabetes  E11.9    Active

 

 Problem  Unsteady gait  R26.81    Active

 

 Problem  Vitamin B12 deficiency  E53.8    Active

 

 Problem  Right upper quadrant abdominal pain  R10.11    Active

 

 Problem  Posterior auricular lymphadenopathy  R59.0    Active

 

 Problem  Mixed hyperlipidemia  E78.2    Active

 

 Problem  Urinary tract infection without  N39.0    Active



   hematuria, site unspecified      

 

 Problem  Thrombocytopenia  D69.6    Active

 

 Problem  Arthritis  M19.90    Active

 

 Problem  GERD (gastroesophageal reflux  K21.9    Active



   disease)      

 

 Problem  CKD (chronic kidney disease) stage  N18.3    Active



   3, GFR 30-59 ml/min      

 

 Problem  Chronic renal disease  N18.9    Active

 

 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



   conditions      

 

 Problem  Depression with anxiety  F41.8    Active

 

 Problem  Long term current use of insulin  Z79.4    Active







Medications

No Known Medications



Results

No Known Results



Summary Purpose

eClinicalWorks Submission

## 2019-08-26 NOTE — XMS REPORT
[facility]

 Created on:2019



Patient:Cecilia Lancaster

Sex:Female

:1935

External Reference #:[id number]





Demographics







 Address  [address] [place] [address]

 

 Phone  [phone]

 

 Preferred Language  es

 

 Marital Status  Unknown

 

 Taoism Affiliation  Unknown

 

 Race  

 

 Ethnic Group  Unknown









Author







 Organization  [employer]









Care Team Providers







 Name  Role  Phone

 

 Allan Yen  Provider Role  Unavailable









Allergies, Adverse Reactions, Alerts







 Substance  Reaction  Event Type

 

 N.K.D.A.  Info Not Available  Non Drug Allergy







Problems







 Problem Type  Condition  Code  Onset Dates  Condition Status

 

 Assessment  Recurrent UTI  N39.0    Active

 

 Problem  DJD (degenerative joint disease),  M15.9    Active



   multiple sites      

 

 Problem  DDD (degenerative disc disease),  M51.37    Active



   lumbosacral      

 

 Problem  Trigger finger  M65.30    Active

 

 Problem  Arteriosclerosis of coronary artery  I25.10    Active

 

 Problem  Hyperlipidemia, mixed  E78.2    Active

 

 Problem  Anemia in chronic illness  D63.8    Active

 

 Problem  Diverticulosis of colon  K57.30    Active

 

 Problem  Benign essential HTN  I10    Active

 

 Problem  Primary osteoarthritis of both hips  M16.0    Active

 

 Problem  Decreased hearing, unspecified  H91.90    Active



   laterality      

 

 Problem  Vitamin B12 deficiency  E53.8    Active

 

 Problem  Fatty liver  K76.0    Active

 

 Problem  Posterior auricular lymphadenopathy  R59.0    Active

 

 Problem  Hyperglycemia  R73.9    Active

 

 Problem  Unsteady gait  R26.81    Active

 

 Problem  Seasonal allergies  J30.2    Active

 

 Problem  GERD (gastroesophageal reflux  K21.9    Active



   disease)      

 

 Problem  Osteoporosis  M81.0    Active

 

 Problem  Malignant neoplasm of urinary  C67.9    Active



   bladder, unspecified site      

 

 Problem  Diabetes  E11.9    Active

 

 Problem  Urinary tract infection without  N39.0    Active



   hematuria, site unspecified      

 

 Problem  Right upper quadrant abdominal pain  R10.11    Active

 

 Problem  Acute on chronic diastolic heart  I50.33    Active



   failure      

 

 Problem  Mixed hyperlipidemia  E78.2    Active

 

 Assessment  Cystitis cystica  N30.80    Active

 

 Problem  Arthritis  M19.90    Active

 

 Assessment  Malignant neoplasm of urinary  C67.9    Active



   bladder, unspecified site      

 

 Problem  Depression with anxiety  F41.8    Active

 

 Problem  CKD (chronic kidney disease) stage  N18.3    Active



   3, GFR 30-59 ml/min      

 

 Problem  Thrombocytopenia  D69.6    Active

 

 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



   conditions      

 

 Problem  Type 2 diabetes mellitus without  E11.9    Active



   complications      

 

 Problem  Long term current use of insulin  Z79.4    Active

 

 Problem  Chronic renal disease  N18.9    Active







Medications







 Medication  Code  Code  Instructions  Start  End Date  Status  Dosage



   System      Date      

 

 Cetirizine HCl  NDC  [id number]  10 MG Orally  Dec 27,    Active  1 tablet



       Once a day        

 

 NovoLog Flexpen  ND  [id number]  100 UNIT/ML      Active  as directed



       Subcutaneous 5        



       units before        



       lunch and        



       dinner        

 

 BD Pen Needle  NDC  [id number]  31G X 8 MM      Active  USE WITH



 Short U/F              PEN 2 TIMES



               A DAY

 

 Aspir-81  NDC  [id number]  81 MG Orally      Active  1 tablet



       Once a day        

 

 Levemir  ND  [id number]  100 UNIT/ML  May 08,    Active  50 units



       Subcutaneous  2018      



       once daily        

 

 Metoprolol  NDC  [id number]  25 MG Orally      Active  1 tablet



 Succinate ER      Once a day        

 

 Zantac  NDC  [id number]  150 MG      Active  TOME CARLOS



               TABLETA POR



               VIA ORAL



               DOS VECES



               AL JAN

 

 Augmentin  NDC  [id number]  875-125 MG  Oct 30,    Active  1 tablet



       Orally every 12        



       hrs        

 

 NovoFine Plus  NDC  [id number]  32G X 4 MM SC      Active  as directed



       twice daily        

 

 Bactrim  NDC  [id number]  400-80 MG    Active  1 tablet



       Orally BID  2019    

 

 Flonase  NDC  [id number]  50 MCG/ACT      Active  1 spray in



       Nasally Once a        each



       day        nostril

 

 Cozaar  NDC  [id number]  25 MG Orally      Active  1 tablet



       Once a day        

 

 Citalopram  NDC  [id number]  10 MG      Active  TOME CARLOS



 Hydrobromide              TABLETA POR



               VIA ORAL



               ONCE A DAY

 

 Tradjenta  NDC  [id number]  5 MG      Active  TOME CARLOS



               TABLETA



               TODOS LOS



               PADRON

 

 Toprol XL  NDC  [id number]  25 MG      Active  TAKE 1



               TABLET BY



               MOUTH DAILY

 

 Macrobid  NDC  [id number]  100 MG Orally  ,    Active  1 capsule



       every 12 hrs        with food

 

 NovoFine Plus  NDC  [id number]  32G X 4 MM SC      Active  as directed



       twice daily        

 

 Trimethoprim  NDC  [id number]  100 mg Orally  ,  Active  1 
tablet



       daily  2019    

 

 Macrobid  NDC  [id number]  100 MG Orally  ,    Active  1 capsule



       every 12 hrs  2018      with food

 

 Simvastatin  NDC  [id number]  80 MG Orally      Active  1 tablet in



       Once a day        the evening

 

 Omeprazole  NDC  [id number]  40 MG Orally      Active  1 capsule



       Once a day        

 

 Furosemide  NDC  [id number]  40 MG Orally      Active  1 tablet



       Once a day        

 

 Promethazine  NDC  [id number]  25 MG Orally      Active  1 tablet as



 HCl      every 6 hrs        needed

 

 Zocor  NDC  [id number]  80 MG      Active  TOME CARLOS



               TABLETA



               TODOS LOS



               PADRON







Results

No Known Results



Summary Purpose

eClinicalWorks Submission

## 2019-08-26 NOTE — EDPHYS
Physician Documentation                                                                           

 [facility]                                                                 

Name: Cecilia Lancaster                                                                                  

Age: 83 yrs                                                                                       

Sex: Female                                                                                       

: 1935                                                                                   

MRN: [id number]                                                                                   

Arrival Date: 2019                                                                          

Time: 10:30                                                                                       

Account#: [id number]                                                                            

Bed 17                                                                                            

Private MD: Yasmin Loepz                                                                              

ED Physician Johnathan Ireland                                                                       

HPI:                                                                                              

                                                                                             

14:04 This 83 yrs old  Female presents to ER via Ambulatory with complaints of Back   gs  

      Pain - fell yest.                                                                           

14:04 The patient presents with pain that is acute. The symptoms are located in the low back, gs  

      L1. Onset: The symptoms/episode began/occurred yesterday, ground level fall. Associated     

      signs and symptoms: Pertinent negatives: abdominal pain, dysuria, hematuria,                

      incontinence, urinary retention. The problem was sustained during a fall, while             

      walking, got lightheaded. Modifying factors: The patient symptoms are alleviated by         

      nothing, the patient symptoms are aggravated by movement. Severity of symptoms: At          

      their worst the symptoms were moderate, in the emergency department the symptoms are        

      unchanged. The patient has experienced similar episodes in the past, a few times.           

                                                                                                  

Historical:                                                                                       

- Allergies:                                                                                      

10:56 NKA;                                                                                    jl7 

- Home Meds:                                                                                      

10:56 Novolog 100 unit/mL Sub-Q soln [Active]; Levemir 100 unit/mL subcutaneous soln          jl7 

      [Active]; simvastatin 80 mg Oral tab [Active]; ranitidine HCl 150 mg Oral cap [Active];     

      aspirin 81 mg Oral TbEC [Active]; trimethoprim 100 mg Oral tab [Active]; losartan 25 mg     

      oral tab [Active]; metoprolol tartrate 25 mg Oral tab [Active]; Lasix 40 mg Oral tab        

      [Active]; citalopram 10 mg tab [Active];                                                    

- PMHx:                                                                                           

10:56 Diabetes - IDDM; Hyperlipidemia; Hypertension; Depression;                              jl7 

11:15 Stage 3 renal disease;                                                                  jl7 

                                                                                                  

- Immunization history:: Adult Immunizations unknown.                                             

- Social history:: Smoking status: Patient/guardian denies using tobacco.                         

- Ebola Screening: : No symptoms or risks identified at this time.                                

                                                                                                  

                                                                                                  

ROS:                                                                                              

14:04 All other systems are negative.                                                         gs  

                                                                                                  

Exam:                                                                                             

14:04 Head/Face:  Normocephalic, atraumatic. Eyes:  Pupils equal round and reactive to light, gs  

      extra-ocular motions intact.  Lids and lashes normal.  Conjunctiva and sclera are           

      non-icteric and not injected.  Cornea within normal limits.  Periorbital areas with no      

      swelling, redness, or edema. ENT:  Nares patent. No nasal discharge, no septal              

      abnormalities noted.  Tympanic membranes are normal and external auditory canals are        

      clear.  Oropharynx with no redness, swelling, or masses, exudates, or evidence of           

      obstruction, uvula midline.  Mucous membranes moist. Neck:  Trachea midline, no             

      thyromegaly or masses palpated, and no cervical lymphadenopathy.  Supple, full range of     

      motion without nuchal rigidity, or vertebral point tenderness.  No Meningismus.             

      Chest/axilla:  Normal chest wall appearance and motion.  Nontender with no deformity.       

      No lesions are appreciated. Cardiovascular:  Regular rate and rhythm with a normal S1       

      and S2.  No gallops, murmurs, or rubs.  Normal PMI, no JVD.  No pulse deficits.             

      Respiratory:  Lungs have equal breath sounds bilaterally, clear to auscultation and         

      percussion.  No rales, rhonchi or wheezes noted.  No increased work of breathing, no        

      retractions or nasal flaring. Abdomen/GI:  Soft, non-tender, with normal bowel sounds.      

      No distension or tympany.  No guarding or rebound.  No evidence of tenderness               

      throughout.                                                                                 

14:04 Skin:  Warm, dry with normal turgor.  Normal color with no rashes, no lesions, and no       

      evidence of cellulitis. Neuro:  Awake and alert, GCS 15, oriented to person, place,         

      time, and situation.  Cranial nerves II-XII grossly intact.  Motor strength 5/5 in all      

      extremities.  Sensory grossly intact.  Cerebellar exam normal.  Normal gait.                

14:04 Constitutional: The patient appears alert, awake.                                           

14:04 Back: vertebral tenderness, is appreciated at L1.                                           

14:04 Musculoskeletal/extremity: Circulation is intact in all extremities. Joints: the  right     

      shoulder displays painful range of motion, tenderness, the  left wrist displays             

      tenderness.                                                                                 

                                                                                                  

Vital Signs:                                                                                      

10:56  / 44; Pulse 65; Resp 16 S; Temp 98.9(TE); Pulse Ox 99% on R/A; Pain 8/10;        jl7 

11:49  / 71; Pulse 50; Resp 16 S; Temp 98.4(TE); Pulse Ox 98% on R/A;                   mh5 

13:31  / 43; Pulse 47; Resp 18; Temp 98.7(TE); Pulse Ox 98% on R/A;                     mh5 

                                                                                                  

MDM:                                                                                              

10:51 Patient medically screened.                                                               

14:04 Differential diagnosis: spinal injury, vertebral fracture, head injury fracture. Data   gs  

      reviewed: vital signs, nurses notes. Counseling: I had a detailed discussion with the       

      patient and/or guardian regarding: the historical points, exam findings, and any            

      diagnostic results supporting the discharge/admit diagnosis, radiology results, the         

      need for outpatient follow up.                                                              

14:04 Data reviewed: lab test result(s), EKG, radiologic studies.                               

                                                                                                  

                                                                                             

10:55 Order name: CBC with Diff; Complete Time: 12:32                                         gs  

                                                                                             

10:55 Order name: Basic Metabolic Panel; Complete Time: 12:32                                   

                                                                                             

10:55 Order name: Shoulder Right (2 View) XRAY; Complete Time: 12:53                            

                                                                                             

10:55 Order name: CT Head Brain wo Cont; Complete Time: 12:53                                   

                                                                                             

12:20 Order name: CBC Smear Scan; Complete Time: 12:32                                        EDMS

                                                                                             

10:55 Order name: CT Abd/Pelvis - Without Contrast; Complete Time: 12:53                        

                                                                                             

10:55 Order name: EKG - Nurse/Tech; Complete Time: 11:50                                        

                                                                                             

10:55 Order name: EKG; Complete Time: 10:56                                                   gs  

                                                                                                  

Administered Medications:                                                                         

11:50 Drug: Tetanus-Diphtheria Toxoid Adult 0.5 ml {: [employer]. Exp:         jl7 

      2021. Lot #: [id number]. } Route: IM; Site: right deltoid;                                

12:05 Follow up: Response: No adverse reaction                                                [facility] 

12:00 Drug: Tylenol 650 mg Route: PO;                                                          

12:30 Follow up: Response: No adverse reaction; Pain is decreased                              

                                                                                                  

                                                                                                  

Disposition:                                                                                      

19 13:21 Discharged to Home. Impression: Fracture of lumbar vertebra.                       

- Condition is Stable.                                                                            

- Discharge Instructions: Vertebral Fracture, Easy-to-Read.                                       

- Prescriptions for Tylenol- Codeine #3 300-30 mg Oral Tablet - take 1 tablet by ORAL             

  route every 6 hours As needed; 10 tablet.                                                       

- Medication Reconciliation Form, Thank You Letter, Antibiotic Education, Prescription            

  Opioid Use form.                                                                                

- Follow up: Napoleon Austin MD; When: 2 - 3 days; Reason: Re-evaluation by your                   

  physician.                                                                                      

                                                                                                  

                                                                                                  

                                                                                                  

Signatures:                                                                                       

Dispatcher [employer]                           [facility]                                                 

Grisel Quiroz RN                        RN   jl7                                                  

Johnathan Ireland MD MD   gs                                                   

                                                                                                  

Corrections: (The following items were deleted from the chart)                                    

12:29 10:56 Hand Left 3 View+RAD.RAD.BRZ ordered. [facility]

13:46 13:21 2019 13:21 Discharged to Home. Impression: Fracture of lumbar vertebra.     jl7 

      Condition is Stable. Forms are Medication Reconciliation Form, Thank You Letter,            

      Antibiotic Education, Prescription Opioid Use. Follow up: Napoleon Austin; When: 2 - 3         

      days; Reason: Re-evaluation by your physician. gs                                           

                                                                                                  

**************************************************************************************************

## 2019-08-26 NOTE — EKG
Test Date:    2019-08-26               Test Time:    11:53:48

Technician:   JENNIFER                                     

                                                     

MEASUREMENT RESULTS:                                       

Intervals:                                           

Rate:         51                                     

KY:           224                                    

QRSD:         130                                    

QT:           512                                    

QTc:          471                                    

Axis:                                                

P:            38                                     

KY:           224                                    

QRS:          55                                     

T:            76                                     

                                                     

INTERPRETIVE STATEMENTS:                                       

                                                     

Sinus bradycardia with 1st degree AV block

Right bundle branch block

Abnormal ECG

Compared to ECG 08/16/2018 18:43:50

First degree AV block now present

Sinus rhythm no longer present



Electronically Signed On 08-26-19 12:40:23 CDT by Kit Ruiz

## 2019-08-26 NOTE — XMS REPORT
[facility]

 Created on:2018



Patient:Cecilia Lancaster

Sex:Female

:1935

External Reference #:[id number]





Demographics







 Address  [address] [place] [address]

 

 Phone  [phone]

 

 Preferred Language  es

 

 Marital Status  Unknown

 

 Zoroastrian Affiliation  Unknown

 

 Race  

 

 Ethnic Group  Unknown









Author







 Organization  [employer]









Care Team Providers







 Name  Role  Phone

 

 Lopez, Na  Provider Role  Unavailable









Allergies, Adverse Reactions, Alerts







 Substance  Reaction  Event Type

 

 N.K.D.A.  Info Not Available  Non Drug Allergy







Problems







 Problem Type  Condition  Code  Onset Dates  Condition Status

 

 Assessment  Acute urinary tract infection  N39.0    Active

 

 Problem  DDD (degenerative disc disease),  M51.37    Active



   lumbosacral      

 

 Problem  DJD (degenerative joint disease),  M15.9    Active



   multiple sites      

 

 Problem  Trigger finger  M65.30    Active

 

 Problem  Arteriosclerosis of coronary artery  I25.10    Active

 

 Problem  Type 2 diabetes mellitus without  E11.9    Active



   complications      

 

 Problem  Hyperlipidemia, mixed  E78.2    Active

 

 Problem  Long term current use of insulin  Z79.4    Active

 

 Problem  Anemia in chronic illness  D63.8    Active

 

 Problem  Chronic renal disease  N18.9    Active

 

 Problem  Decreased hearing, unspecified  H91.90    Active



   laterality      

 

 Problem  Primary osteoarthritis of both hips  M16.0    Active

 

 Problem  Acute on chronic diastolic heart  I50.33    Active



   failure      

 

 Problem  Urinary tract infection without  N39.0    Active



   hematuria, site unspecified      

 

 Problem  Vitamin B12 deficiency  E53.8    Active

 

 Problem  Benign essential HTN  I10    Active

 

 Problem  Mixed hyperlipidemia  E78.2    Active

 

 Problem  Diverticulosis of colon  K57.30    Active

 

 Problem  Hyperglycemia  R73.9    Active

 

 Problem  Fatty liver  K76.0    Active

 

 Problem  Posterior auricular lymphadenopathy  R59.0    Active

 

 Problem  Right upper quadrant abdominal pain  R10.11    Active

 

 Problem  GERD (gastroesophageal reflux  K21.9    Active



   disease)      

 

 Problem  Depression with anxiety  F41.8    Active

 

 Problem  Diabetes  E11.9    Active

 

 Problem  Osteoporosis  M81.0    Active

 

 Problem  Arthritis  M19.90    Active

 

 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



   conditions      

 

 Problem  CKD (chronic kidney disease) stage  N18.3    Active



   3, GFR 30-59 ml/min      

 

 Problem  Thrombocytopenia  D69.6    Active







Medications







 Medication  Code  Code  Instructions  Start  End  Status  Dosage



   System      Date  Date    

 

 Promethazine HCl  NDC  20468165913  25 MG Orally      Active  1 tablet as



       every 6 hrs        needed

 

 Simvastatin  NDC  77959912875  80 MG Orally      Active  1 tablet in



       Once a day        the evening

 

 Zocor  NDC  87472256260  80 MG      Active  TOME CARLOS



               TABLETA



               TODOS LOS



               PADRON

 

 Flonase  ND  [id number]  50 MCG/ACT      Active  1 spray in



       Nasally Once a        each



       day        nostril

 

 Citalopram  NDC  22164684586  10 MG      Active  TOME CARLOS



 Hydrobromide              TABLETA POR



               VIA ORAL



               ONCE A DAY

 

 Macrobid  NDC  [id number]  100 MG Orally  ,    Active  1 capsule



       every 12 hrs        with food

 

 Zantac  NDC  [id number]  150 MG      Active  TOME CARLOS



               TABLETA POR



               VIA ORAL



               DOS VECES



               AL JAN

 

 Macrobid  NDC  [id number]  100 MG Orally  ,    Active  1 capsule



       every 12 hrs  2018      with food

 

 Toprol XL  NDC  74491787929  25 MG      Active  TAKE 1



               TABLET BY



               MOUTH DAILY

 

 Metoprolol  NDC  40778514850  25 MG Orally      Active  1 tablet



 Succinate ER      Once a day        

 

 NovoFine Plus  NDC  [id number]  32G X 4 MM SC      Active  as directed



       twice daily        

 

 NovoLog Flexpen  ND  94844192967  100 UNIT/ML      Active  as directed



       Subcutaneous 5        



       units before        



       lunch and        



       dinner        

 

 Levemir  ND  [id number]  100 UNIT/ML  May 08,    Active  50 units



       Subcutaneous  2018      



       once daily        

 

 Tradjenta  NDC  06040637015  5 MG      Active  TOME CARLOS



               TABLETA



               TODOS LOS



               PADRON

 

 Omeprazole  NDC  51791440546  40 MG Orally      Active  1 capsule



       Once a day        

 

 Aspir-81  NDC  07691448156  81 MG Orally      Active  1 tablet



       Once a day        

 

 Cozaar  NDC  79845021043  25 MG Orally      Active  1 tablet



       Once a day        







Results

No Known Results



Summary Purpose

[employer] Submission

## 2019-08-26 NOTE — XMS REPORT
[facility]

 Created on:2019



Patient:Cecilia Lancaster

Sex:Female

:1935

External Reference #:[id number]





Demographics







 Address  [address] [place] [address]

 

 Phone  [phone]

 

 Preferred Language  es

 

 Marital Status  Unknown

 

 Oriental orthodox Affiliation  Unknown

 

 Race  

 

 Ethnic Group  Unknown









Author







 Organization  [employer]









Care Team Providers







 Name  Role  Phone

 

 Lopez, Na  Provider Role  Unavailable









Allergies, Adverse Reactions, Alerts







 Substance  Reaction  Event Type

 

 N.K.D.A.  Info Not Available  Non Drug Allergy







Problems







 Problem Type  Condition  Code  Onset Dates  Condition Status

 

 Assessment  Type 2 diabetes mellitus without  E11.9    Active



   complications      

 

 Assessment  Mixed hyperlipidemia  E78.2    Active

 

 Assessment  Acute on chronic diastolic heart  I50.33    Active



   failure      

 

 Problem  DDD (degenerative disc disease),  M51.37    Active



   lumbosacral      

 

 Problem  DJD (degenerative joint disease),  M15.9    Active



   multiple sites      

 

 Problem  Trigger finger  M65.30    Active

 

 Problem  Arteriosclerosis of coronary artery  I25.10    Active

 

 Problem  Hyperlipidemia, mixed  E78.2    Active

 

 Problem  Anemia in chronic illness  D63.8    Active

 

 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



   conditions      

 

 Problem  Type 2 diabetes mellitus without  E11.9    Active



   complications      

 

 Problem  Diverticulosis of colon  K57.30    Active

 

 Problem  Primary osteoarthritis of both hips  M16.0    Active

 

 Problem  Fatty liver  K76.0    Active

 

 Problem  Decreased hearing, unspecified  H91.90    Active



   laterality      

 

 Problem  Acute on chronic diastolic heart  I50.33    Active



   failure      

 

 Problem  Mixed hyperlipidemia  E78.2    Active

 

 Problem  Diabetes  E11.9    Active

 

 Problem  Vitamin B12 deficiency  E53.8    Active

 

 Problem  Seasonal allergies  J30.2    Active

 

 Problem  Benign essential HTN  I10    Active

 

 Problem  Posterior auricular lymphadenopathy  R59.0    Active

 

 Problem  Hyperglycemia  R73.9    Active

 

 Problem  Urinary tract infection without  N39.0    Active



   hematuria, site unspecified      

 

 Problem  Right upper quadrant abdominal pain  R10.11    Active

 

 Assessment  CKD (chronic kidney disease) stage  N18.3    Active



   3, GFR 30-59 ml/min      

 

 Problem  Depression with anxiety  F41.8    Active

 

 Assessment  Fatty liver  K76.0    Active

 

 Problem  CKD (chronic kidney disease) stage  N18.3    Active



   3, GFR 30-59 ml/min      

 

 Assessment  Thrombocytopenia  D69.6    Active

 

 Problem  Osteoporosis  M81.0    Active

 

 Assessment  Long term current use of insulin  Z79.4    Active

 

 Problem  GERD (gastroesophageal reflux  K21.9    Active



   disease)      

 

 Assessment  Bilateral leg edema  R60.0    Active

 

 Problem  Long term current use of insulin  Z79.4    Active

 

 Assessment  Anemia in chronic illness  D63.8    Active

 

 Problem  Chronic renal disease  N18.9    Active

 

 Problem  Thrombocytopenia  D69.6    Active

 

 Problem  Arthritis  M19.90    Active







Medications







 Medication  Code  Code  Instructions  Start  End  Status  Dosage



   System      Date  Date    

 

 Macrobid  NDC  [id number]  100 MG Orally  ,    Active  1 capsule



       every 12 hrs  2018      with food

 

 Augmentin  NDC  [id number]  875-125 MG  Oct 30,    Active  1 tablet



       Orally every 12  2018      



       hrs        

 

 Tradjenta  NDC  81390235726  5 MG      Active  TOME CARLOS



               TABLETA



               TODOS LOS



               PADRON

 

 Toprol XL  NDC  [id number]  25 MG      Active  TAKE 1



               TABLET BY



               MOUTH DAILY

 

 Zantac  ND  [id number]  150 MG      Active  TOME CARLOS



               TABLETA POR



               VIA ORAL



               DOS VECES



               AL JAN

 

 Zocor  ND  89045951011  80 MG      Active  TOME CARLOS



               TABLETA



               TODOS LOS



               PADRON

 

 Cozaar  NDC  [id number]  25 MG Orally      Active  1 tablet



       Once a day        

 

 Citalopram  NDC  59490460301  10 MG      Active  TOME CARLOS



 Hydrobromide              TABLETA POR



               VIA ORAL



               ONCE A DAY

 

 Promethazine HCl  NDC  51095835779  25 MG Orally      Active  1 tablet as



       every 6 hrs        needed

 

 Levemir  ND  57927405483  100 UNIT/ML  May 08,    Active  50 units



       Subcutaneous  2018      



       once daily        

 

 NovoFine Plus  NDC  [id number]  32G X 4 MM SC      Active  as directed



       twice daily        

 

 NovoLog Flexpen  ND  47773791269  100 UNIT/ML      Active  as directed



       Subcutaneous 5        



       units before        



       lunch and        



       dinner        

 

 Macrobid  NDC  [id number]  100 MG Orally  ,    Active  1 capsule



       every 12 hrs  2018      with food

 

 Furosemide  ND  [id number]  40 MG Orally  Dec 06,    Active  1 tablet



       Once a day  2018      

 

 Aspir-81  NDC  [id number]  81 MG Orally      Active  1 tablet



       Once a day        

 

 Metoprolol  NDC  14834696640  25 MG Orally      Active  1 tablet



 Succinate ER      Once a day        

 

 Flonase  ND  [id number]  50 MCG/ACT      Active  1 spray in



       Nasally Once a        each



       day        nostril

 

 Simvastatin  NDC  01491555302  80 MG Orally      Active  1 tablet in



       Once a day        the evening

 

 Omeprazole  NDC  58363277686  40 MG Orally      Active  1 capsule



       Once a day        







Results

No Known Results



Summary Purpose

[employer] Submission

## 2019-08-26 NOTE — XMS REPORT
[facility]

 Created on:2019



Patient:Cecilia Lancaster

Sex:Female

:1935

External Reference #:[id number]





Demographics







 Address  [address] [place] [address]

 

 Phone  [phone]

 

 Preferred Language  es

 

 Marital Status  Unknown

 

 Hindu Affiliation  Unknown

 

 Race  

 

 Ethnic Group  Unknown









Author







 Organization  [employer]









Care Team Providers







 Name  Role  Phone

 

 Virginia Salazar  Provider Role  Unavailable









Allergies, Adverse Reactions, Alerts







 Substance  Reaction  Event Type

 

 N.K.D.A.  Info Not Available  Non Drug Allergy







Problems







 Problem Type  Condition  Code  Onset Dates  Condition Status

 

 Problem  DDD (degenerative disc disease),  M51.37    Active



   lumbosacral      

 

 Problem  Trigger finger  M65.30    Active

 

 Problem  DJD (degenerative joint disease),  M15.9    Active



   multiple sites      

 

 Problem  Arteriosclerosis of coronary artery  I25.10    Active

 

 Problem  Hyperlipidemia, mixed  E78.2    Active

 

 Problem  Anemia in chronic illness  D63.8    Active

 

 Problem  Diverticulosis of colon  K57.30    Active

 

 Problem  Benign essential HTN  I10    Active

 

 Problem  Vitamin B12 deficiency  E53.8    Active

 

 Problem  Decreased hearing, unspecified  H91.90    Active



   laterality      

 

 Problem  Fatty liver  K76.0    Active

 

 Problem  Diabetes  E11.9    Active

 

 Problem  Hyperglycemia  R73.9    Active

 

 Problem  Right upper quadrant abdominal pain  R10.11    Active

 

 Problem  Posterior auricular lymphadenopathy  R59.0    Active

 

 Problem  Malignant neoplasm of urinary  C67.9    Active



   bladder, unspecified site      

 

 Problem  Unsteady gait  R26.81    Active

 

 Problem  CKD (chronic kidney disease) stage  N18.3    Active



   3, GFR 30-59 ml/min      

 

 Problem  GERD (gastroesophageal reflux  K21.9    Active



   disease)      

 

 Problem  Leukocytopenia, unspecified  D72.819    Active

 

 Problem  Osteoporosis  M81.0    Active

 

 Problem  Mixed hyperlipidemia  E78.2    Active

 

 Problem  Urinary tract infection without  N39.0    Active



   hematuria, site unspecified      

 

 Problem  Seasonal allergies  J30.2    Active

 

 Problem  Acute on chronic diastolic heart  I50.33    Active



   failure      

 

 Problem  Depression with anxiety  F41.8    Active

 

 Assessment  Recurrent UTI  N39.0    Active

 

 Problem  Long term current use of insulin  Z79.4    Active

 

 Problem  Thrombocytopenia  D69.6    Active

 

 Problem  Arthritis  M19.90    Active

 

 Problem  Type 2 diabetes mellitus without  E11.9    Active



   complications      

 

 Problem  Primary osteoarthritis of both hips  M16.0    Active

 

 Problem  Chronic renal disease  N18.9    Active

 

 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



   conditions      







Medications







 Medication  Code  Code  Instructions  Start  End  Status  Dosage



   System      Date  Date    

 

 Citalopram  NDC  [id number]  10 MG      Active  TOME CARLOS



 Hydrobromide              TABLETA POR



               VIA ORAL



               ONCE A DAY

 

 Furosemide  NDC  86905563761  40 MG Orally      Active  1 tablet



       Once a day        

 

 Simvastatin  NDC  04444783804  80 MG Orally      Active  1 tablet in



       Once a day        the evening

 

 Levemir  ND  [id number]  100 UNIT/ML  May 15,    Active  as directed



 FlexTouch      Subcutaneous 50  2019      



       units once a        



       day        

 

 Metoprolol  NDC  [id number]  25 MG Orally      Active  1 tablet



 Succinate ER      Once a day        

 

 Macrobid  NDC  [id number]  100 MG Orally  ,    Active  1 capsule



       every 12 hrs  2018      with food

 

 Tradjenta  NDC  [id number]  5 MG      Active  TOME CARLOS



               TABLETA



               TODOS LOS



               PADRON

 

 Furosemide  NDC  35012439566  40 MG Orally      Active  1 tablet



       Once a day        

 

 Flonase  ND  [id number]  50 MCG/ACT      Active  1 spray in



       Nasally Once a        each



       day        nostril

 

 BD Pen Needle  NDC  [id number]  31G X 8 MM      Active  USE WITH



 Short U/F              PEN 2 TIMES



               A DAY

 

 Promethazine HCl  NDC  [id number]  25 MG Orally      Active  1 tablet as



       every 6 hrs        needed

 

 Omeprazole  NDC  94938125893  40 MG Orally      Active  1 capsule



       Once a day        

 

 Zocor  NDC  86534985420  80 MG      Active  TOME CARLOS



               TABLETA



               TODOS LOS



               PADRON

 

 Macrobid  NDC  [id number]  100 MG Orally  ,    Active  1 capsule



       every 12 hrs  2018      with food

 

 Premarin  NDC  29277417981  0.625 MG/GM  ,    Active  1 gm



       Vaginal twice  2019      



       weekly        

 

 NovoLog Flexpen  ND  [id number]  100 UNIT/ML      Active  as directed



       Subcutaneous 15        



       units before        



       lunch and        



       dinner        

 

 Toprol XL  NDC  26317647675  25 MG      Active  TAKE 1



               TABLET BY



               MOUTH DAILY

 

 Levemir  ND  [id number]  100 UNIT/ML      Active  50 units



       Subcutaneous        



       once daily        

 

 Zantac  NDC  52542384833  150 MG      Active  TOME CARLOS



               TABLETA POR



               VIA ORAL



               DOS VECES



               AL JAN

 

 Augmentin  NDC  [id number]  875-125 MG  Oct 30,  July  Active  1 tablet



       Orally every 2018  12,    



       hrs    2019    

 

 Cetirizine HCl  NDC  13141254914  10 MG Orally  Dec 27,    Active  1 tablet



       Once a day  2018      

 

 Trimethoprim  NDC  34987349085  100 mg Orally    Active  1 tablet



       daily  2019    

 

 Aspir-81  NDC  73098417877  81 MG Orally      Active  1 tablet



       Once a day        

 

 NovoFine Plus  NDC  02663448525  32G X 4 MM SC      Active  as directed



       twice daily        

 

 NovoFine Plus  NDC  39350520820  32G X 4 MM SC      Active  as directed



       twice daily        

 

 Cozaar  NDC  [id number]  25 MG Orally      Active  1 tablet



       Once a day        







Results







 Name  Result  Date  Reference Range  Unit  Abnormality Flag

 

 UMIC with Reflex to Urine          



 Culture          

 

 ----Urine Culture Reflex  REFLEXED  2019      



 Order          

 

 ----Urine WBC  5-10  [id number]  <5    AA

 

 ----Urine RBC  <5  [id number]  NONE SEEN    

 

 ----Urine Bacteria  >50  [id number]  <20    AA

 

 ----Sqamous Epithelial  <5  [id number]  NONE SEEN    







Summary Purpose

[employer]Works Submission

## 2019-08-26 NOTE — XMS REPORT
[facility]

 Created on:2018



Patient:Cecilia Lancaster

Sex:Female

:1935

External Reference #:[id number]





Demographics







 Address  [address] [place] [address]

 

 Phone  [phone]

 

 Preferred Language  es

 

 Marital Status  Unknown

 

 Yarsani Affiliation  Unknown

 

 Race  

 

 Ethnic Group  Unknown









Author







 Organization  [employer]









Care Team Providers







 Name  Role  Phone

 

 Lopez, Na  Provider Role  Unavailable









Allergies

No Known Allergies



Problems







 Problem Type  Condition  Code  Onset Dates  Condition Status

 

 Problem  Benign essential HTN  I10    Active

 

 Problem  Vitamin B12 deficiency  E53.8    Active

 

 Problem  Hyperlipidemia, mixed  E78.2    Active

 

 Problem  Diabetes  E11.9    Active

 

 Problem  Anemia in chronic illness  D63.8    Active

 

 Problem  Osteoporosis  M81.0    Active

 

 Problem  Diverticulosis of colon  K57.30    Active

 

 Problem  Chronic renal disease  N18.9    Active

 

 Problem  Long term current use of insulin  Z79.4    Active

 

 Problem  Posterior auricular lymphadenopathy  R59.0    Active

 

 Problem  Hyperglycemia  R73.9    Active

 

 Problem  CKD (chronic kidney disease) stage  N18.3    Active



   3, GFR 30-59 ml/min      

 

 Problem  Depression with anxiety  F41.8    Active

 

 Problem  Urinary tract infection without  N39.0    Active



   hematuria, site unspecified      

 

 Problem  GERD (gastroesophageal reflux  K21.9    Active



   disease)      

 

 Problem  Decreased hearing, unspecified  H91.90    Active



   laterality      

 

 Problem  Primary osteoarthritis of both hips  M16.0    Active

 

 Problem  Fatty liver  K76.0    Active

 

 Problem  Right upper quadrant abdominal pain  R10.11    Active

 

 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



   conditions      

 

 Problem  Type 2 diabetes mellitus without  E11.9    Active



   complications      

 

 Problem  Thrombocytopenia  D69.6    Active

 

 Problem  Arthritis  M19.90    Active

 

 Problem  Trigger finger  M65.30    Active

 

 Problem  Arteriosclerosis of coronary artery  I25.10    Active

 

 Problem  DDD (degenerative disc disease),  M51.37    Active



   lumbosacral      

 

 Problem  DJD (degenerative joint disease),  M15.9    Active



   multiple sites      







Medications

No Known Medications



Results

No Known Results



Summary Purpose

[employer] Submission

## 2019-08-26 NOTE — XMS REPORT
[facility]

 Created on:2018



Patient:Cecilia Lancaster

Sex:Female

:1935

External Reference #:[id number]





Demographics







 Address  [address] [place] [address]

 

 Phone  [phone]

 

 Preferred Language  es

 

 Marital Status  Unknown

 

 Alevism Affiliation  Unknown

 

 Race  

 

 Ethnic Group  Unknown









Author







 Organization  [employer]









Care Team Providers







 Name  Role  Phone

 

 Lopez, Na  Provider Role  Unavailable









Allergies

No Known Allergies



Problems







 Problem Type  Condition  Code  Onset Dates  Condition Status

 

 Problem  Benign essential HTN  I10    Active

 

 Problem  Vitamin B12 deficiency  E53.8    Active

 

 Problem  Hyperlipidemia, mixed  E78.2    Active

 

 Problem  Diabetes  E11.9    Active

 

 Problem  Anemia in chronic illness  D63.8    Active

 

 Problem  Osteoporosis  M81.0    Active

 

 Problem  Diverticulosis of colon  K57.30    Active

 

 Problem  Chronic renal disease  N18.9    Active

 

 Problem  Long term current use of insulin  Z79.4    Active

 

 Problem  Posterior auricular lymphadenopathy  R59.0    Active

 

 Problem  Hyperglycemia  R73.9    Active

 

 Problem  CKD (chronic kidney disease) stage  N18.3    Active



   3, GFR 30-59 ml/min      

 

 Problem  Depression with anxiety  F41.8    Active

 

 Problem  Urinary tract infection without  N39.0    Active



   hematuria, site unspecified      

 

 Problem  GERD (gastroesophageal reflux  K21.9    Active



   disease)      

 

 Problem  Decreased hearing, unspecified  H91.90    Active



   laterality      

 

 Problem  Primary osteoarthritis of both hips  M16.0    Active

 

 Problem  Fatty liver  K76.0    Active

 

 Problem  Right upper quadrant abdominal pain  R10.11    Active

 

 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



   conditions      

 

 Problem  Type 2 diabetes mellitus without  E11.9    Active



   complications      

 

 Problem  Thrombocytopenia  D69.6    Active

 

 Problem  Arthritis  M19.90    Active

 

 Problem  Trigger finger  M65.30    Active

 

 Problem  Arteriosclerosis of coronary artery  I25.10    Active

 

 Problem  DDD (degenerative disc disease),  M51.37    Active



   lumbosacral      

 

 Problem  DJD (degenerative joint disease),  M15.9    Active



   multiple sites      







Medications

No Known Medications



Results

No Known Results



Summary Purpose

[employer]Clinical[employer] Submission

## 2019-08-26 NOTE — XMS REPORT
[facility]

 Created on:2018



Patient:Cecilia Lancaster

Sex:Female

:1935

External Reference #:[id number]





Demographics







 Address  [address] [place] [address]

 

 Phone  [phone]

 

 Preferred Language  es

 

 Marital Status  Unknown

 

 Scientology Affiliation  Unknown

 

 Race  

 

 Ethnic Group  Unknown









Author







 Organization  [employer]









Care Team Providers







 Name  Role  Phone

 

 Lopez, Na  Provider Role  Unavailable









Allergies

No Known Allergies



Problems







 Problem Type  Condition  Code  Onset Dates  Condition Status

 

 Problem  Benign essential HTN  I10    Active

 

 Problem  Vitamin B12 deficiency  E53.8    Active

 

 Problem  Hyperlipidemia, mixed  E78.2    Active

 

 Problem  Diabetes  E11.9    Active

 

 Problem  Anemia in chronic illness  D63.8    Active

 

 Problem  Osteoporosis  M81.0    Active

 

 Problem  Diverticulosis of colon  K57.30    Active

 

 Problem  Chronic renal disease  N18.9    Active

 

 Problem  Long term current use of insulin  Z79.4    Active

 

 Problem  Posterior auricular lymphadenopathy  R59.0    Active

 

 Problem  Hyperglycemia  R73.9    Active

 

 Problem  CKD (chronic kidney disease) stage  N18.3    Active



   3, GFR 30-59 ml/min      

 

 Problem  Depression with anxiety  F41.8    Active

 

 Problem  Urinary tract infection without  N39.0    Active



   hematuria, site unspecified      

 

 Problem  GERD (gastroesophageal reflux  K21.9    Active



   disease)      

 

 Problem  Decreased hearing, unspecified  H91.90    Active



   laterality      

 

 Problem  Primary osteoarthritis of both hips  M16.0    Active

 

 Problem  Fatty liver  K76.0    Active

 

 Problem  Right upper quadrant abdominal pain  R10.11    Active

 

 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



   conditions      

 

 Problem  Type 2 diabetes mellitus without  E11.9    Active



   complications      

 

 Problem  Thrombocytopenia  D69.6    Active

 

 Problem  Arthritis  M19.90    Active

 

 Problem  Trigger finger  M65.30    Active

 

 Problem  Arteriosclerosis of coronary artery  I25.10    Active

 

 Problem  DDD (degenerative disc disease),  M51.37    Active



   lumbosacral      

 

 Problem  DJD (degenerative joint disease),  M15.9    Active



   multiple sites      







Medications







 Medication  Code System  Code  Instructions  Start Date  End Date  Status  
Dosage

 

 Cipro  NDC  [id number]  500 MG Orally  ,  ,  Active  1 tablet



       every 12 hrs  2018    







Results

No Known Results



Summary Purpose

eClinicalWorks Submission

## 2019-08-26 NOTE — ER
Nurse's Notes                                                                                     

 [facility]                                                                 

Name: Cecilia Lancaster                                                                                  

Age: 83 yrs                                                                                       

Sex: Female                                                                                       

: 1935                                                                                   

MRN: [id number]                                                                                   

Arrival Date: 2019                                                                          

Time: 10:30                                                                                       

Account#: [id number]                                                                            

Bed 17                                                                                            

Private MD: Yasmin Lopez                                                                              

Diagnosis: Fracture of lumbar vertebra                                                            

                                                                                                  

Presentation:                                                                                     

                                                                                             

10:47 Presenting complaint: Child states: She stood up yesterday and got dizzy and fell back  jl7 

      into a table, c/o back pain. Pt did bump her head, denies LOC. Transition of care:          

      patient was not received from another setting of care. Onset of symptoms was 2019. Risk Assessment: Do you want to hurt yourself or someone else? Patient reports no     

      desire to harm self or others. Initial Sepsis Screen: Does the patient meet any 2           

      criteria? No. Patient's initial sepsis screen is negative. Does the patient have a          

      suspected source of infection? No. Patient's initial sepsis screen is negative. Care        

      prior to arrival: None.                                                                     

10:47 Method Of Arrival: Ambulatory                                                           jl7 

10:47 Acuity: RICK 4                                                                           jl7 

                                                                                                  

Historical:                                                                                       

- Allergies:                                                                                      

10:56 NKA;                                                                                    jl7 

- Home Meds:                                                                                      

10:56 Novolog 100 unit/mL Sub-Q soln [Active]; Levemir 100 unit/mL subcutaneous soln          jl7 

      [Active]; simvastatin 80 mg Oral tab [Active]; ranitidine HCl 150 mg Oral cap [Active];     

      aspirin 81 mg Oral TbEC [Active]; trimethoprim 100 mg Oral tab [Active]; losartan 25 mg     

      oral tab [Active]; metoprolol tartrate 25 mg Oral tab [Active]; Lasix 40 mg Oral tab        

      [Active]; citalopram 10 mg tab [Active];                                                    

- PMHx:                                                                                           

10:56 Diabetes - IDDM; Hyperlipidemia; Hypertension; Depression;                              jl7 

11:15 Stage 3 renal disease;                                                                  jl7 

                                                                                                  

- Immunization history:: Adult Immunizations unknown.                                             

- Social history:: Smoking status: Patient/guardian denies using tobacco.                         

- Ebola Screening: : No symptoms or risks identified at this time.                                

                                                                                                  

                                                                                                  

Screenin:00 Abuse screen: Denies threats or abuse. Denies injuries from another. Nutritional        jl7 

      screening: No deficits noted. Tuberculosis screening: No symptoms or risk factors           

      identified.                                                                                 

11:49 Fall Risk Fall in past 12 months (25 points). Total Zayas Fall Scale indicates Low Risk jl7 

      Score (25-44 pts). Fall prevention measures have been instituted. Side Rails Up X 2         

      Placed close to Nursing Station Frequent Obs/Assesments occuring Family Present and         

      informed to notify staff if they need to leave bedside As available Patient and Family      

      Educated on Fall Prevention Program and strategies.                                         

                                                                                                  

Assessment:                                                                                       

11:00 General: Appears in no apparent distress. uncomfortable, Behavior is calm, cooperative, jl7 

      appropriate for age. Pain: Complains of pain in mid back area Pain currently is 8 out       

      of 10 on a pain scale. at worst was 10 out of 10 on a pain scale. Pain began 1 day ago.     

      Is continuous. Neuro: Level of Consciousness is awake, alert, obeys commands, Oriented      

      to person, place, time, situation, Moves all extremities. Cardiovascular: Patient's         

      skin is warm and dry. Respiratory: Airway is patent Respiratory effort is even,             

      unlabored, Respiratory pattern is regular, symmetrical. Derm: Skin is pink, warm \T\ dry.   

12:00 Reassessment: Patient appears in no apparent distress at this time. No changes from     jl7 

      previously documented assessment. Patient and/or family updated on plan of care and         

      expected duration. Pain level reassessed. Patient is alert, oriented x 3, equal             

      unlabored respirations, skin warm/dry/pink.                                                 

12:30 Reassessment: Decreased pain reported, rated 4/10 at this time.                         jl7 

13:00 Reassessment: Patient appears in no apparent distress at this time. Patient and/or      jl7 

      family updated on plan of care and expected duration. Pain level reassessed. Patient is     

      alert, oriented x 3, equal unlabored respirations, skin warm/dry/pink.                      

                                                                                                  

Vital Signs:                                                                                      

10:56  / 44; Pulse 65; Resp 16 S; Temp 98.9(TE); Pulse Ox 99% on R/A; Pain 8/10;        jl7 

11:49  / 71; Pulse 50; Resp 16 S; Temp 98.4(TE); Pulse Ox 98% on R/A;                   mh5 

13:31  / 43; Pulse 47; Resp 18; Temp 98.7(TE); Pulse Ox 98% on R/A;                     mh5 

                                                                                                  

ED Course:                                                                                        

10:30 Patient arrived in ED.                                                                  as  

10:30 Yasmin Lopez MD is Private Physician.                                                      as  

10:35 Johnathan Ireland MD is Attending Physician.                                              gs  

10:37 Grisel Quiroz RN is Primary Nurse.                                                      jl7 

10:51 Triage completed.                                                                       jl7 

10:56 Arm band placed on right wrist.                                                         jl7 

11:00 Patient has correct armband on for positive identification. Bed in low position. Call   jl7 

      light in reach. Side rails up X 1. Pulse ox on. NIBP on. Warm blanket given.                

11:22 CT Head Brain wo Cont In Process Unspecified.                                           EDMS

11:22 CT Abd/Pelvis - Without Contrast In Process Unspecified.                                EDMS

11:35 X-ray completed. Portable x-ray completed in exam room. Patient tolerated procedure     jb2 

      well. Note: PT REFUSED LT HAND XRAYS AT THIS TIME, NOTIFIED NURSE, UNABLE TO LOCATE DR IRELAND TO NOTIFY HIM.                                                                        

11:42 Shoulder Right (2 View) XRAY In Process Unspecified.                                    EDMS

11:49 Initial lab(s) drawn, by me, sent to lab.                                               jl7 

12:00 EKG done, by EKG tech. reviewed by Johnathan Ireland MD.                                    3 

13:20 Napoleon Austin MD is Referral Physician.                                                gs  

13:45 No provider procedures requiring assistance completed. Patient did not have IV access   jl7 

      during this emergency room visit.                                                           

                                                                                                  

Administered Medications:                                                                         

11:50 Drug: Tetanus-Diphtheria Toxoid Adult 0.5 ml {: [employer]. Exp:         jl7 

      2021. Lot #: [id number]. } Route: IM; Site: right deltoid;                                

12:05 Follow up: Response: No adverse reaction                                                jl7 

12:00 Drug: Tylenol 650 mg Route: PO;                                                         jl7 

12:30 Follow up: Response: No adverse reaction; Pain is decreased                             jl7 

                                                                                                  

                                                                                                  

Outcome:                                                                                          

13:21 Discharge ordered by MD.                                                                gs  

13:45 Discharged to home ambulatory, with family.                                             jl7 

13:45 Condition: stable                                                                           

13:45 Discharge instructions given to patient, family, Instructed on discharge instructions,      

      follow up and referral plans. medication usage, Demonstrated understanding of               

      instructions, follow-up care, medications, Prescriptions given X 1.                         

13:46 Patient left the ED.                                                                    jl7 

                                                                                                  

Signatures:                                                                                       

Dispatcher [employer]                           EDMS                                                 

Nelson Dockery                              jb2                                                  

Rosalia San Maria                              5                                                  

Grisel Quiroz, RN                        RN   jl7                                                  

Johnathan Ireland MD MD                                                      

Malika Juarez                              3                                                  

                                                                                                  

Corrections: (The following items were deleted from the chart)                                    

12:06 11:49  / 71; Pulse 50bpm; Resp 16bpm; Spontaneous; Pulse Ox 98% RA; jl7           5 

                                                                                                  

**************************************************************************************************

## 2019-08-26 NOTE — XMS REPORT
[facility]

 Created on:2019



Patient:Cecilia Lancaster

Sex:Female

:1935

External Reference #:[id number]





Demographics







 Address  [address] [place] [address]

 

 Phone  [phone]

 

 Preferred Language  es

 

 Marital Status  Unknown

 

 [employer] Affiliation  Unknown

 

 Race  

 

 Ethnic Group  Unknown









Author







 Organization  [employer]









Care Team Providers







 Name  Role  Phone

 

 Virginia Salazar  Provider Role  Unavailable









Allergies, Adverse Reactions, Alerts







 Substance  Reaction  Event Type

 

 N.K.D.A.  Info Not Available  Non Drug Allergy







Problems







 Problem Type  Condition  Code  Onset Dates  Condition Status

 

 Assessment  Recurrent UTI  N39.0    Active

 

 Problem  DDD (degenerative disc disease),  M51.37    Active



   lumbosacral      

 

 Problem  DJD (degenerative joint disease),  M15.9    Active



   multiple sites      

 

 Problem  Trigger finger  M65.30    Active

 

 Problem  Arteriosclerosis of coronary artery  I25.10    Active

 

 Problem  Hyperlipidemia, mixed  E78.2    Active

 

 Problem  Anemia in chronic illness  D63.8    Active

 

 Problem  Diverticulosis of colon  K57.30    Active

 

 Problem  Type 2 diabetes mellitus without  E11.9    Active



   complications      

 

 Problem  Primary osteoarthritis of both hips  M16.0    Active

 

 Problem  Benign essential HTN  I10    Active

 

 Problem  Decreased hearing, unspecified  H91.90    Active



   laterality      

 

 Problem  Hyperglycemia  R73.9    Active

 

 Problem  Fatty liver  K76.0    Active

 

 Problem  Seasonal allergies  J30.2    Active

 

 Problem  Acute on chronic diastolic heart  I50.33    Active



   failure      

 

 Problem  Osteoporosis  M81.0    Active

 

 Problem  Diabetes  E11.9    Active

 

 Problem  Unsteady gait  R26.81    Active

 

 Problem  Vitamin B12 deficiency  E53.8    Active

 

 Problem  Right upper quadrant abdominal pain  R10.11    Active

 

 Problem  Posterior auricular lymphadenopathy  R59.0    Active

 

 Problem  Mixed hyperlipidemia  E78.2    Active

 

 Problem  Urinary tract infection without  N39.0    Active



   hematuria, site unspecified      

 

 Problem  Thrombocytopenia  D69.6    Active

 

 Problem  Arthritis  M19.90    Active

 

 Problem  GERD (gastroesophageal reflux  K21.9    Active



   disease)      

 

 Problem  CKD (chronic kidney disease) stage  N18.3    Active



   3, GFR 30-59 ml/min      

 

 Problem  Chronic renal disease  N18.9    Active

 

 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



   conditions      

 

 Problem  Depression with anxiety  F41.8    Active

 

 Problem  Long term current use of insulin  Z79.4    Active







Medications







 Medication  Code  Code  Instructions  Start  End  Status  Dosage



   System      Date  Date    

 

 Augmentin  NDC  97199461112  875-125 MG  Oct 30,    Active  1 tablet



       Orally every 12  2018      



       hrs        

 

 Tradjenta  NDC  [id number]  5 MG      Active  TOME CARLOS



               TABLETA



               TODOS LOS



               PADRON

 

 Simvastatin  ND  [id number]  80 MG Orally      Active  1 tablet in



       Once a day        the evening

 

 BD Pen Needle  ND  45339426080  31G X 8 MM      Active  USE WITH



 Short U/F              PEN 2 TIMES



               A DAY

 

 Cetirizine HCl  NDC  [id number]  10 MG Orally  Dec 27,    Active  1 tablet



       Once a day        

 

 Levemir  ND  [id number]  100 UNIT/ML  May 08,    Active  50 units



       Subcutaneous  2018      



       once daily        

 

 Macrobid  NDC  [id number]  100 MG Orally  ,  ,  Active  1 capsule



       every 12 hrs  2019    with food

 

 Macrobid  NDC  [id number]  100 MG Orally  ,    Active  1 capsule



       every 12 hrs        with food

 

 Zantac  ND  [id number]  150 MG      Active  TOME CARLOS



               TABLETA POR



               VIA ORAL



               DOS VECES



               AL JAN

 

 Cozaar  NDC  [id number]  25 MG Orally      Active  1 tablet



       Once a day        

 

 NovoFine Plus  NDC  [id number]  32G X 4 MM SC      Active  as directed



       twice daily        

 

 Zocor  NDC  [id number]  80 MG      Active  TOME CARLOS



               TABLETA



               TODOS LOS



               PADRON

 

 Toprol XL  NDC  45248263815  25 MG      Active  TAKE 1



               TABLET BY



               MOUTH DAILY

 

 Omeprazole  NDC  [id number]  40 MG Orally      Active  1 capsule



       Once a day        

 

 Aspir-81  NDC  [id number]  81 MG Orally      Active  1 tablet



       Once a day        

 

 Flonase  ND  [id number]  50 MCG/ACT      Active  1 spray in



       Nasally Once a        each



       day        nostril

 

 Citalopram  NDC  45123715732  10 MG      Active  TOME CARLOS



 Hydrobromide              TABLETA POR



               VIA ORAL



               ONCE A DAY

 

 Promethazine HCl  NDC  [id number]  25 MG Orally      Active  1 tablet as



       every 6 hrs        needed

 

 Macrobid  NDC  [id number]  100 MG Orally  ,    Active  1 capsule



       every 12 hrs        with food

 

 NovoLog Flexpen  ND  27655192914  100 UNIT/ML      Active  as directed



       Subcutaneous 5        



       units before        



       lunch and        



       dinner        

 

 Furosemide  NDC  71915748521  40 MG Orally  Dec 06,    Active  1 tablet



       Once a day  2018      

 

 Metoprolol  NDC  [id number]  25 MG Orally      Active  1 tablet



 Succinate ER      Once a day        







Results







 Name  Result  Date  Reference Range  Unit  Abnormality Flag

 

 PVR          

 

 ----PVR  0  2019      







Summary Purpose

[employer] Submission

## 2019-08-26 NOTE — XMS REPORT
[facility]

 Created on:2019



Patient:Cecilia Lancaster

Sex:Female

:1935

External Reference #:[id number]





Demographics







 Address  [address] [place] [address]

 

 Phone  [phone]

 

 Preferred Language  es

 

 Marital Status  Unknown

 

 Voodoo Affiliation  Unknown

 

 Race  

 

 Ethnic Group  Unknown









Author







 Organization  [employer]









Care Team Providers







 Name  Role  Phone

 

 Lopez, Na  Provider Role  Unavailable









Allergies, Adverse Reactions, Alerts







 Substance  Reaction  Event Type

 

 N.K.D.A.  Info Not Available  Non Drug Allergy







Problems







 Problem Type  Condition  Code  Onset Dates  Condition Status

 

 Problem  DDD (degenerative disc disease),  M51.37    Active



   lumbosacral      

 

 Problem  Trigger finger  M65.30    Active

 

 Problem  DJD (degenerative joint disease),  M15.9    Active



   multiple sites      

 

 Problem  Arteriosclerosis of coronary artery  I25.10    Active

 

 Problem  Hyperlipidemia, mixed  E78.2    Active

 

 Problem  Anemia in chronic illness  D63.8    Active

 

 Problem  Diverticulosis of colon  K57.30    Active

 

 Problem  Benign essential HTN  I10    Active

 

 Problem  Vitamin B12 deficiency  E53.8    Active

 

 Problem  Decreased hearing, unspecified  H91.90    Active



   laterality      

 

 Problem  Fatty liver  K76.0    Active

 

 Problem  Diabetes  E11.9    Active

 

 Problem  Hyperglycemia  R73.9    Active

 

 Problem  Right upper quadrant abdominal pain  R10.11    Active

 

 Problem  Posterior auricular lymphadenopathy  R59.0    Active

 

 Problem  Malignant neoplasm of urinary  C67.9    Active



   bladder, unspecified site      

 

 Problem  Unsteady gait  R26.81    Active

 

 Problem  CKD (chronic kidney disease) stage  N18.3    Active



   3, GFR 30-59 ml/min      

 

 Problem  GERD (gastroesophageal reflux  K21.9    Active



   disease)      

 

 Assessment  Long term current use of insulin  Z79.4    Active

 

 Problem  Leukocytopenia, unspecified  D72.819    Active

 

 Problem  Osteoporosis  M81.0    Active

 

 Assessment  Thrombocytopenia  D69.6    Active

 

 Problem  Mixed hyperlipidemia  E78.2    Active

 

 Assessment  Anemia in chronic illness  D63.8    Active

 

 Problem  Urinary tract infection without  N39.0    Active



   hematuria, site unspecified      

 

 Assessment  Leukocytopenia, unspecified  D72.819    Active

 

 Problem  Seasonal allergies  J30.2    Active

 

 Problem  Acute on chronic diastolic heart  I50.33    Active



   failure      

 

 Assessment  Cervicalgia  M54.2    Active

 

 Problem  Depression with anxiety  F41.8    Active

 

 Assessment  Type 2 diabetes mellitus without  E11.9    Active



   complications      

 

 Problem  Long term current use of insulin  Z79.4    Active

 

 Assessment  Mixed hyperlipidemia  E78.2    Active

 

 Problem  Thrombocytopenia  D69.6    Active

 

 Assessment  Acute on chronic diastolic heart  I50.33    Active



   failure      

 

 Problem  Arthritis  M19.90    Active

 

 Assessment  CKD (chronic kidney disease) stage  N18.3    Active



   3, GFR 30-59 ml/min      

 

 Problem  Type 2 diabetes mellitus without  E11.9    Active



   complications      

 

 Assessment  Fatty liver  K76.0    Active

 

 Problem  Primary osteoarthritis of both hips  M16.0    Active

 

 Problem  Chronic renal disease  N18.9    Active

 

 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



   conditions      







Medications







 Medication  Code  Code  Instructions  Start  End  Status  Dosage



   System      Date  Date    

 

 Cetirizine HCl  NDC  [id number]  10 MG Orally  Dec 27,    Active  1 tablet



       Once a day  2018      

 

 Furosemide  NDC  [id number]  40 MG Orally      Active  1 tablet



       Once a day        

 

 Levemir  ND  [id number]  100 UNIT/ML  May 15,    Active  as directed



 FlexTouch      Subcutaneous 50  2019      



       units once a        



       day        

 

 Trimethoprim  NDC  [id number]  100 mg Orally    Active  1 tablet



       daily  2019    

 

 Cozaar  NDC  [id number]  25 MG Orally      Active  1 tablet



       Once a day        

 

 Macrobid  NDC  [id number]  100 MG Orally  ,    Active  1 capsule



       every 12 hrs        with food

 

 Zocor  NDC  [id number]  80 MG      Active  TOME CARLOS



               TABLETA



               TODOS LOS



               PADRON

 

 Furosemide  NDC  [id number]  40 MG Orally      Active  1 tablet



       Once a day        

 

 Flonase  ND  [id number]  50 MCG/ACT      Active  1 spray in



       Nasally Once a        each



       day        nostril

 

 Aspir-81  NDC  [id number]  81 MG Orally      Active  1 tablet



       Once a day        

 

 NovoLog Flexpen  ND  [id number]  100 UNIT/ML      Active  as directed



       Subcutaneous 15        



       units before        



       lunch and        



       dinner        

 

 Metoprolol  NDC  [id number]  25 MG Orally      Active  1 tablet



 Succinate ER      Once a day        

 

 Augmentin  ND  [id number]  875-125 MG  Oct 30,    Active  1 tablet



       Orally every 12  2018      



       hrs        

 

 Tradjenta  NDC  [id number]  5 MG      Active  TOME CARLOS



               TABLETA



               TODOS LOS



               PADRON

 

 Zantac  ND  [id number]  150 MG      Active  TOME CARLOS



               TABLETA POR



               VIA ORAL



               DOS VECES



               AL JAN

 

 Citalopram  NDC  [id number]  10 MG      Active  TOME CARLOS



 Hydrobromide              TABLETA POR



               VIA ORAL



               ONCE A DAY

 

 Macrobid  NDC  [id number]  100 MG Orally  ,    Active  1 capsule



       every 12 hrs  2018      with food

 

 Levemir  NDC  [id number]  100 UNIT/ML      Active  50 units



       Subcutaneous        



       once daily        

 

 Simvastatin  NDC  [id number]  80 MG Orally      Active  1 tablet in



       Once a day        the evening

 

 NovoFine Plus  NDC  [id number]  32G X 4 MM SC      Active  as directed



       twice daily        

 

 Omeprazole  NDC  [id number]  40 MG Orally      Active  1 capsule



       Once a day        

 

 Toprol XL  NDC  [id number]  25 MG      Active  TAKE 1



               TABLET BY



               MOUTH DAILY

 

 BD Pen Needle  NDC  [id number]  31G X 8 MM      Active  USE WITH



 Short U/F              PEN 2 TIMES



               A DAY

 

 Promethazine HCl  NDC  [id number]  25 MG Orally      Active  1 tablet as



       every 6 hrs        needed

 

 NovoFine Plus  NDC  [id number]  32G X 4 MM SC      Active  as directed



       twice daily        







Results

No Known Results



Summary Purpose

eClinicalWorks Submission

## 2019-08-26 NOTE — XMS REPORT
[facility]

 Created on:May 3, 2019



Patient:Cecilia Lancaster

Sex:Female

:1935

External Reference #:[id number]





Demographics







 Address  [address] [place] [address]

 

 Phone  [phone]

 

 Preferred Language  es

 

 Marital Status  Unknown

 

 Scientologist Affiliation  Unknown

 

 Race  

 

 Ethnic Group  Unknown









Author







 Organization  [employer]









Care Team Providers







 Name  Role  Phone

 

 Virginia Salazar  Provider Role  Unavailable









Allergies

No Known Allergies



Problems







 Problem Type  Condition  Code  Onset Dates  Condition Status

 

 Problem  DJD (degenerative joint disease),  M15.9    Active



   multiple sites      

 

 Problem  DDD (degenerative disc disease),  M51.37    Active



   lumbosacral      

 

 Problem  Trigger finger  M65.30    Active

 

 Problem  Arteriosclerosis of coronary artery  I25.10    Active

 

 Problem  Hyperlipidemia, mixed  E78.2    Active

 

 Problem  Anemia in chronic illness  D63.8    Active

 

 Problem  Diverticulosis of colon  K57.30    Active

 

 Problem  Benign essential HTN  I10    Active

 

 Problem  Primary osteoarthritis of both hips  M16.0    Active

 

 Problem  Decreased hearing, unspecified  H91.90    Active



   laterality      

 

 Problem  Vitamin B12 deficiency  E53.8    Active

 

 Problem  Fatty liver  K76.0    Active

 

 Problem  Posterior auricular lymphadenopathy  R59.0    Active

 

 Problem  Hyperglycemia  R73.9    Active

 

 Problem  Unsteady gait  R26.81    Active

 

 Problem  Seasonal allergies  J30.2    Active

 

 Problem  GERD (gastroesophageal reflux  K21.9    Active



   disease)      

 

 Problem  Osteoporosis  M81.0    Active

 

 Problem  Malignant neoplasm of urinary  C67.9    Active



   bladder, unspecified site      

 

 Problem  Diabetes  E11.9    Active

 

 Problem  Urinary tract infection without  N39.0    Active



   hematuria, site unspecified      

 

 Problem  Right upper quadrant abdominal pain  R10.11    Active

 

 Problem  Acute on chronic diastolic heart  I50.33    Active



   failure      

 

 Problem  Mixed hyperlipidemia  E78.2    Active

 

 Problem  Arthritis  M19.90    Active

 

 Problem  Depression with anxiety  F41.8    Active

 

 Problem  CKD (chronic kidney disease) stage  N18.3    Active



   3, GFR 30-59 ml/min      

 

 Problem  Thrombocytopenia  D69.6    Active

 

 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



   conditions      

 

 Problem  Type 2 diabetes mellitus without  E11.9    Active



   complications      

 

 Problem  Long term current use of insulin  Z79.4    Active

 

 Problem  Chronic renal disease  N18.9    Active







Medications







 Medication  Code  Code  Instructions  Start  End Date  Status  Dosage



   System      Date      

 

 Cefdinir  NDC  [id number]  300 MG Orally  May 03,  May 08,  Active  as 
directed



       BID  2019    







Results

No Known Results



Summary Purpose

[employer]ClinicalWorks Submission

## 2019-08-26 NOTE — XMS REPORT
[facility]

 Created on:2018



Patient:Cecilia Lancaster

Sex:Female

:1935

External Reference #:[id number]





Demographics







 Address  [address] [place] [address]

 

 Phone  [phone]

 

 Preferred Language  es

 

 Marital Status  Unknown

 

 Jain Affiliation  Unknown

 

 Race  

 

 Ethnic Group  Unknown









Author







 Organization  [employer]









Care Team Providers







 Name  Role  Phone

 

 Lopez, Na  Provider Role  Unavailable









Allergies

No Known Allergies



Problems







 Problem Type  Condition  Code  Onset Dates  Condition Status

 

 Problem  Benign essential HTN  I10    Active

 

 Problem  Vitamin B12 deficiency  E53.8    Active

 

 Problem  Hyperlipidemia, mixed  E78.2    Active

 

 Problem  Diabetes  E11.9    Active

 

 Problem  Anemia in chronic illness  D63.8    Active

 

 Problem  Osteoporosis  M81.0    Active

 

 Problem  Diverticulosis of colon  K57.30    Active

 

 Problem  Chronic renal disease  N18.9    Active

 

 Problem  Long term current use of insulin  Z79.4    Active

 

 Problem  Posterior auricular lymphadenopathy  R59.0    Active

 

 Problem  Hyperglycemia  R73.9    Active

 

 Problem  CKD (chronic kidney disease) stage  N18.3    Active



   3, GFR 30-59 ml/min      

 

 Problem  Depression with anxiety  F41.8    Active

 

 Problem  Urinary tract infection without  N39.0    Active



   hematuria, site unspecified      

 

 Problem  GERD (gastroesophageal reflux  K21.9    Active



   disease)      

 

 Problem  Decreased hearing, unspecified  H91.90    Active



   laterality      

 

 Problem  Primary osteoarthritis of both hips  M16.0    Active

 

 Problem  Fatty liver  K76.0    Active

 

 Problem  Right upper quadrant abdominal pain  R10.11    Active

 

 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



   conditions      

 

 Problem  Type 2 diabetes mellitus without  E11.9    Active



   complications      

 

 Problem  Thrombocytopenia  D69.6    Active

 

 Problem  Arthritis  M19.90    Active

 

 Problem  Trigger finger  M65.30    Active

 

 Problem  Arteriosclerosis of coronary artery  I25.10    Active

 

 Problem  DDD (degenerative disc disease),  M51.37    Active



   lumbosacral      

 

 Problem  DJD (degenerative joint disease),  M15.9    Active



   multiple sites      







Medications

No Known Medications



Results

No Known Results



Summary Purpose

eClinicalWorks Submission

## 2019-08-26 NOTE — RAD REPORT
EXAM DESCRIPTION:  CT - Abdomen   Pelvis Wo Contrast - 8/26/2019 11:21 am

 

CLINICAL HISTORY:  Abdominal pain.

back pain injury

 

COMPARISON:  CT ABDOMEN PELVIS WO CONTRAST dated 9/30/2013; Lumbar Spine 3 Views dated 1/17/2019

 

TECHNIQUE:  CT imaging of the abdomen and pelvis was performed without contrast. Solid organ, bowel a
nd vascular assessment is limited due to lack of IV and oral contrast.

 

All CT scans are performed using dose optimization technique as appropriate and may include automated
 exposure control or mA/KV adjustment according to patient size.

 

FINDINGS:  The lower lung fields are clear.Heart size is mildly enlarged.

 

Noncontrast assessment of the liver shows no focal mass or biliary dilatation. Cholecystectomy clips 
seen. The spleen, pancreas, adrenal glands kidneys are unremarkable for noncontrast imaging.

 

No bowel obstruction, free air, free fluid or abscess. Sigmoid diverticulosis coli is present without
 diverticulitis. Air is present the urinary bladder.

 

Significant compression fracture is seen affecting the L1 vertebral body. This was present on a Janua
ry 2019 plain radiograph and thus is likely chronic. No significant canal compromise.Aortic atheroscl
erosis.

 

IMPRESSION:  Urinary bladder air may indicate section/cystitis or recent instrumentation.

 

Chronic L1 compression fracture is seen without canal compromise.

 

A limited non-contrast examination was performed as detailed.

## 2019-08-26 NOTE — XMS REPORT
[facility]

 Created on:2019



Patient:Cecilia Lancaster

Sex:Female

:1935

External Reference #:[id number]





Demographics







 Address  [address] [place] [address]

 

 Phone  [phone]

 

 Preferred Language  es

 

 Marital Status  Unknown

 

 Confucianism Affiliation  Unknown

 

 Race  

 

 Ethnic Group  Unknown









Author







 Organization  [employer]









Care Team Providers







 Name  Role  Phone

 

 Lopez, Na  Provider Role  Unavailable









Allergies

No Known Allergies



Problems







 Problem Type  Condition  Code  Onset Dates  Condition Status

 

 Problem  DDD (degenerative disc disease),  M51.37    Active



   lumbosacral      

 

 Problem  DJD (degenerative joint disease),  M15.9    Active



   multiple sites      

 

 Problem  Trigger finger  M65.30    Active

 

 Problem  Arteriosclerosis of coronary artery  I25.10    Active

 

 Problem  Hyperlipidemia, mixed  E78.2    Active

 

 Problem  Anemia in chronic illness  D63.8    Active

 

 Problem  Diverticulosis of colon  K57.30    Active

 

 Problem  Type 2 diabetes mellitus without  E11.9    Active



   complications      

 

 Problem  Primary osteoarthritis of both hips  M16.0    Active

 

 Problem  Benign essential HTN  I10    Active

 

 Problem  Decreased hearing, unspecified  H91.90    Active



   laterality      

 

 Problem  Hyperglycemia  R73.9    Active

 

 Problem  Fatty liver  K76.0    Active

 

 Problem  Seasonal allergies  J30.2    Active

 

 Problem  Acute on chronic diastolic heart  I50.33    Active



   failure      

 

 Problem  Osteoporosis  M81.0    Active

 

 Problem  Diabetes  E11.9    Active

 

 Problem  Unsteady gait  R26.81    Active

 

 Problem  Vitamin B12 deficiency  E53.8    Active

 

 Problem  Right upper quadrant abdominal pain  R10.11    Active

 

 Problem  Posterior auricular lymphadenopathy  R59.0    Active

 

 Problem  Mixed hyperlipidemia  E78.2    Active

 

 Problem  Urinary tract infection without  N39.0    Active



   hematuria, site unspecified      

 

 Problem  Thrombocytopenia  D69.6    Active

 

 Problem  Arthritis  M19.90    Active

 

 Problem  GERD (gastroesophageal reflux  K21.9    Active



   disease)      

 

 Problem  CKD (chronic kidney disease) stage  N18.3    Active



   3, GFR 30-59 ml/min      

 

 Problem  Chronic renal disease  N18.9    Active

 

 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



   conditions      

 

 Problem  Depression with anxiety  F41.8    Active

 

 Problem  Long term current use of insulin  Z79.4    Active







Medications

No Known Medications



Results

No Known Results



Summary Purpose

eClinicalWorks Submission

## 2019-08-26 NOTE — XMS REPORT
[facility]

 Created on:2018



Patient:Cecilia Lancaster

Sex:Female

:1935

External Reference #:[id number]





Demographics







 Address  [address] [place] [address]

 

 Phone  [phone]

 

 Preferred Language  es

 

 Marital Status  Unknown

 

 Cheondoism Affiliation  Unknown

 

 Race  

 

 Ethnic Group  Unknown









Author







 Organization  [employer]









Care Team Providers







 Name  Role  Phone

 

 Lopez, Na  Provider Role  Unavailable









Allergies

No Known Allergies



Problems







 Problem Type  Condition  Code  Onset Dates  Condition Status

 

 Problem  Benign essential HTN  I10    Active

 

 Problem  Vitamin B12 deficiency  E53.8    Active

 

 Problem  Hyperlipidemia, mixed  E78.2    Active

 

 Problem  Diabetes  E11.9    Active

 

 Problem  Anemia in chronic illness  D63.8    Active

 

 Problem  Osteoporosis  M81.0    Active

 

 Problem  Diverticulosis of colon  K57.30    Active

 

 Problem  Chronic renal disease  N18.9    Active

 

 Problem  Long term current use of insulin  Z79.4    Active

 

 Problem  Posterior auricular lymphadenopathy  R59.0    Active

 

 Problem  Hyperglycemia  R73.9    Active

 

 Problem  CKD (chronic kidney disease) stage  N18.3    Active



   3, GFR 30-59 ml/min      

 

 Problem  Depression with anxiety  F41.8    Active

 

 Problem  Urinary tract infection without  N39.0    Active



   hematuria, site unspecified      

 

 Problem  GERD (gastroesophageal reflux  K21.9    Active



   disease)      

 

 Problem  Decreased hearing, unspecified  H91.90    Active



   laterality      

 

 Problem  Primary osteoarthritis of both hips  M16.0    Active

 

 Problem  Fatty liver  K76.0    Active

 

 Problem  Right upper quadrant abdominal pain  R10.11    Active

 

 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



   conditions      

 

 Problem  Type 2 diabetes mellitus without  E11.9    Active



   complications      

 

 Problem  Thrombocytopenia  D69.6    Active

 

 Problem  Arthritis  M19.90    Active

 

 Problem  Trigger finger  M65.30    Active

 

 Problem  Arteriosclerosis of coronary artery  I25.10    Active

 

 Problem  DDD (degenerative disc disease),  M51.37    Active



   lumbosacral      

 

 Problem  DJD (degenerative joint disease),  M15.9    Active



   multiple sites      







Medications

No Known Medications



Results

No Known Results



Summary Purpose

eClinicalWorks Submission

## 2019-08-26 NOTE — XMS REPORT
[facility]

 Created on:2019



Patient:Cecilia Lancaster

Sex:Female

:1935

External Reference #:[id number]





Demographics







 Address  [address] [place] [address]

 

 Phone  [phone]

 

 Preferred Language  es

 

 Marital Status  Unknown

 

 Scientology Affiliation  Unknown

 

 Race  

 

 Ethnic Group  Unknown









Author







 Organization  [employer]Works









Care Team Providers







 Name  Role  Phone

 

 Virginia Salazar  Provider Role  Unavailable









Allergies

No Known Allergies



Problems







 Problem Type  Condition  Code  Onset Dates  Condition Status

 

 Problem  DDD (degenerative disc disease),  M51.37    Active



   lumbosacral      

 

 Problem  DJD (degenerative joint disease),  M15.9    Active



   multiple sites      

 

 Problem  Trigger finger  M65.30    Active

 

 Problem  Arteriosclerosis of coronary artery  I25.10    Active

 

 Problem  Hyperlipidemia, mixed  E78.2    Active

 

 Problem  Anemia in chronic illness  D63.8    Active

 

 Problem  Diverticulosis of colon  K57.30    Active

 

 Problem  Type 2 diabetes mellitus without  E11.9    Active



   complications      

 

 Problem  Primary osteoarthritis of both hips  M16.0    Active

 

 Problem  Benign essential HTN  I10    Active

 

 Problem  Decreased hearing, unspecified  H91.90    Active



   laterality      

 

 Problem  Hyperglycemia  R73.9    Active

 

 Problem  Fatty liver  K76.0    Active

 

 Problem  Seasonal allergies  J30.2    Active

 

 Problem  Acute on chronic diastolic heart  I50.33    Active



   failure      

 

 Problem  Osteoporosis  M81.0    Active

 

 Problem  Diabetes  E11.9    Active

 

 Problem  Unsteady gait  R26.81    Active

 

 Problem  Vitamin B12 deficiency  E53.8    Active

 

 Problem  Right upper quadrant abdominal pain  R10.11    Active

 

 Problem  Posterior auricular lymphadenopathy  R59.0    Active

 

 Problem  Mixed hyperlipidemia  E78.2    Active

 

 Problem  Urinary tract infection without  N39.0    Active



   hematuria, site unspecified      

 

 Problem  Thrombocytopenia  D69.6    Active

 

 Problem  Arthritis  M19.90    Active

 

 Problem  GERD (gastroesophageal reflux  K21.9    Active



   disease)      

 

 Problem  CKD (chronic kidney disease) stage  N18.3    Active



   3, GFR 30-59 ml/min      

 

 Problem  Chronic renal disease  N18.9    Active

 

 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



   conditions      

 

 Problem  Depression with anxiety  F41.8    Active

 

 Problem  Long term current use of insulin  Z79.4    Active







Medications







 Medication  Code System  Code  Instructions  Start  End Date  Status  Dosage



         Date      

 

 Cipro  NDC  [id number]  250 MG Orally one  ,  ,  Active  1 
tablet



       hour before    2019    



       procedure        







Results

No Known Results



Summary Purpose

eClinicalWorks Submission

## 2019-08-26 NOTE — XMS REPORT
[facility]

 Created on:2019



Patient:Cecilia aLncaster

Sex:Female

:1935

External Reference #:[id number]





Demographics







 Address  [address] [place] [address]

 

 Phone  [phone]

 

 Preferred Language  es

 

 Marital Status  Unknown

 

 Yarsanism Affiliation  Unknown

 

 Race  

 

 Ethnic Group  Unknown









Author







 Organization  [employer]ClinicalWorks









Care Team Providers







 Name  Role  Phone

 

 Lopez, Na  Provider Role  Unavailable









Allergies

No Known Allergies



Problems







 Problem Type  Condition  Code  Onset Dates  Condition Status

 

 Problem  DDD (degenerative disc disease),  M51.37    Active



   lumbosacral      

 

 Problem  DJD (degenerative joint disease),  M15.9    Active



   multiple sites      

 

 Problem  Trigger finger  M65.30    Active

 

 Problem  Arteriosclerosis of coronary artery  I25.10    Active

 

 Problem  Hyperlipidemia, mixed  E78.2    Active

 

 Problem  Anemia in chronic illness  D63.8    Active

 

 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



   conditions      

 

 Problem  Type 2 diabetes mellitus without  E11.9    Active



   complications      

 

 Problem  Diverticulosis of colon  K57.30    Active

 

 Problem  Primary osteoarthritis of both hips  M16.0    Active

 

 Problem  Fatty liver  K76.0    Active

 

 Problem  Decreased hearing, unspecified  H91.90    Active



   laterality      

 

 Problem  Acute on chronic diastolic heart  I50.33    Active



   failure      

 

 Problem  Mixed hyperlipidemia  E78.2    Active

 

 Problem  Diabetes  E11.9    Active

 

 Problem  Vitamin B12 deficiency  E53.8    Active

 

 Problem  Seasonal allergies  J30.2    Active

 

 Problem  Benign essential HTN  I10    Active

 

 Problem  Posterior auricular lymphadenopathy  R59.0    Active

 

 Problem  Hyperglycemia  R73.9    Active

 

 Problem  Urinary tract infection without  N39.0    Active



   hematuria, site unspecified      

 

 Problem  Right upper quadrant abdominal pain  R10.11    Active

 

 Problem  Depression with anxiety  F41.8    Active

 

 Problem  CKD (chronic kidney disease) stage  N18.3    Active



   3, GFR 30-59 ml/min      

 

 Problem  Osteoporosis  M81.0    Active

 

 Problem  GERD (gastroesophageal reflux  K21.9    Active



   disease)      

 

 Problem  Long term current use of insulin  Z79.4    Active

 

 Problem  Chronic renal disease  N18.9    Active

 

 Problem  Thrombocytopenia  D69.6    Active

 

 Problem  Arthritis  M19.90    Active







Medications







 Medication  Code  Code  Instructions  Start  End Date  Status  Dosage



   System      Date      

 

 Cetirizine HCl  NDC  01217161367  10 MG Orally  Dec 27,    Active  1 tablet



       Once a day  2018      







Results

No Known Results



Summary Purpose

eClinicalWorks Submission

## 2019-08-26 NOTE — RAD REPORT
EXAM DESCRIPTION:  CT - Head Brain Wo Cont - 8/26/2019 11:21 am

 

CLINICAL HISTORY:  DIZZINESS

Fall, head injury, dizziness

 

COMPARISON:  No comparisons

 

TECHNIQUE:  All CT scans are performed using dose optimization technique as appropriate and may inclu
de automated exposure control or mA/KV adjustment according to patient size.

 

FINDINGS:  No intracranial hemorrhage, hydrocephalus or extra-axial fluid collection.Mild generalized
 brain atrophy is present with mild periventricular and deep white matter chronic microvascular ische
pat changes.No areas of brain edema or evidence of midline shift.

 

The paranasal sinuses and mastoids are clear. The calvarium is intact. 27 x 13 mm oblong soft tissue 
lesion is seen along the left posterior scalp.

 

IMPRESSION:  No acute intracranial abnormality.

## 2019-08-26 NOTE — XMS REPORT
[facility]

 Created on:2019



Patient:Cecilia Lancaster

Sex:Female

:1935

External Reference #:[id number]





Demographics







 Address  [address] [place] [address]

 

 Phone  [phone]

 

 Preferred Language  es

 

 Marital Status  Unknown

 

 [employer] Affiliation  Unknown

 

 Race  

 

 Ethnic Group  Unknown









Author







 Organization  [employer]ClinicalWorks









Care Team Providers







 Name  Role  Phone

 

 Virginia Salazar  Provider Role  Unavailable









Allergies

No Known Allergies



Problems







 Problem Type  Condition  Code  Onset Dates  Condition Status

 

 Problem  DDD (degenerative disc disease),  M51.37    Active



   lumbosacral      

 

 Problem  Trigger finger  M65.30    Active

 

 Problem  DJD (degenerative joint disease),  M15.9    Active



   multiple sites      

 

 Problem  Arteriosclerosis of coronary artery  I25.10    Active

 

 Problem  Hyperlipidemia, mixed  E78.2    Active

 

 Problem  Anemia in chronic illness  D63.8    Active

 

 Problem  Diverticulosis of colon  K57.30    Active

 

 Problem  Benign essential HTN  I10    Active

 

 Problem  Vitamin B12 deficiency  E53.8    Active

 

 Problem  Decreased hearing, unspecified  H91.90    Active



   laterality      

 

 Problem  Fatty liver  K76.0    Active

 

 Problem  Diabetes  E11.9    Active

 

 Problem  Hyperglycemia  R73.9    Active

 

 Problem  Right upper quadrant abdominal pain  R10.11    Active

 

 Problem  Posterior auricular lymphadenopathy  R59.0    Active

 

 Problem  Malignant neoplasm of urinary  C67.9    Active



   bladder, unspecified site      

 

 Problem  Unsteady gait  R26.81    Active

 

 Problem  CKD (chronic kidney disease) stage  N18.3    Active



   3, GFR 30-59 ml/min      

 

 Problem  GERD (gastroesophageal reflux  K21.9    Active



   disease)      

 

 Problem  Leukocytopenia, unspecified  D72.819    Active

 

 Problem  Osteoporosis  M81.0    Active

 

 Problem  Mixed hyperlipidemia  E78.2    Active

 

 Problem  Urinary tract infection without  N39.0    Active



   hematuria, site unspecified      

 

 Problem  Seasonal allergies  J30.2    Active

 

 Problem  Acute on chronic diastolic heart  I50.33    Active



   failure      

 

 Problem  Depression with anxiety  F41.8    Active

 

 Problem  Long term current use of insulin  Z79.4    Active

 

 Problem  Thrombocytopenia  D69.6    Active

 

 Problem  Arthritis  M19.90    Active

 

 Problem  Type 2 diabetes mellitus without  E11.9    Active



   complications      

 

 Problem  Primary osteoarthritis of both hips  M16.0    Active

 

 Problem  Chronic renal disease  N18.9    Active

 

 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



   conditions      







Medications







 Medication  Code System  Code  Instructions  Start Date  End Date  Status  
Dosage

 

 Diflucan  NDC  [id number]  150 MG Orally  ,  ,  Active  1 tablet



       once a day  2019    







Results

No Known Results



Summary Purpose

eClinicalWorks Submission

## 2019-08-26 NOTE — XMS REPORT
[facility]

 Created on:May 9, 2019



Patient:Cecilia Lancaster

Sex:Female

:1935

External Reference #:[id number]





Demographics







 Address  [address] [place] [address]

 

 Phone  [phone]

 

 Preferred Language  es

 

 Marital Status  Unknown

 

 Caodaism Affiliation  Unknown

 

 Race  

 

 Ethnic Group  Unknown









Author







 Organization  [employer]ClinicalWorks









Care Team Providers







 Name  Role  Phone

 

 Virginia Salazar  Provider Role  Unavailable









Allergies, Adverse Reactions, Alerts







 Substance  Reaction  Event Type

 

 N.K.D.A.  Info Not Available  Non Drug Allergy







Problems







 Problem Type  Condition  Code  Onset Dates  Condition Status

 

 Assessment  Recurrent UTI  N39.0    Active

 

 Problem  DJD (degenerative joint disease),  M15.9    Active



   multiple sites      

 

 Problem  DDD (degenerative disc disease),  M51.37    Active



   lumbosacral      

 

 Problem  Trigger finger  M65.30    Active

 

 Problem  Arteriosclerosis of coronary artery  I25.10    Active

 

 Problem  Hyperlipidemia, mixed  E78.2    Active

 

 Problem  Anemia in chronic illness  D63.8    Active

 

 Problem  Diverticulosis of colon  K57.30    Active

 

 Problem  Benign essential HTN  I10    Active

 

 Problem  Primary osteoarthritis of both hips  M16.0    Active

 

 Problem  Decreased hearing, unspecified  H91.90    Active



   laterality      

 

 Problem  Vitamin B12 deficiency  E53.8    Active

 

 Problem  Fatty liver  K76.0    Active

 

 Problem  Posterior auricular lymphadenopathy  R59.0    Active

 

 Problem  Hyperglycemia  R73.9    Active

 

 Problem  Unsteady gait  R26.81    Active

 

 Problem  Seasonal allergies  J30.2    Active

 

 Problem  GERD (gastroesophageal reflux  K21.9    Active



   disease)      

 

 Problem  Osteoporosis  M81.0    Active

 

 Problem  Malignant neoplasm of urinary  C67.9    Active



   bladder, unspecified site      

 

 Problem  Diabetes  E11.9    Active

 

 Problem  Urinary tract infection without  N39.0    Active



   hematuria, site unspecified      

 

 Problem  Right upper quadrant abdominal pain  R10.11    Active

 

 Problem  Acute on chronic diastolic heart  I50.33    Active



   failure      

 

 Problem  Mixed hyperlipidemia  E78.2    Active

 

 Problem  Arthritis  M19.90    Active

 

 Problem  Depression with anxiety  F41.8    Active

 

 Problem  CKD (chronic kidney disease) stage  N18.3    Active



   3, GFR 30-59 ml/min      

 

 Problem  Thrombocytopenia  D69.6    Active

 

 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



   conditions      

 

 Problem  Type 2 diabetes mellitus without  E11.9    Active



   complications      

 

 Problem  Long term current use of insulin  Z79.4    Active

 

 Problem  Chronic renal disease  N18.9    Active







Medications







 Medication  Code  Code  Instructions  Start  End  Status  Dosage



   System      Date  Date    

 

 NovoLog Flexpen  NDC  [id number]  100 UNIT/ML      Active  as directed



       Subcutaneous 5        



       units before        



       lunch and        



       dinner        

 

 Furosemide  NDC  [id number]  40 MG Orally      Active  1 tablet



       Once a day        

 

 Simvastatin  NDC  91601430893  80 MG Orally      Active  1 tablet in



       Once a day        the evening

 

 Promethazine HCl  NDC  [id number]  25 MG Orally      Active  1 tablet as



       every 6 hrs        needed

 

 BD Pen Needle  NDC  [id number]  31G X 8 MM      Active  USE WITH



 Short U/F              PEN 2 TIMES



               A DAY

 

 Augmentin  NDC  [id number]  875-125 MG  Oct 30,    Active  1 tablet



       Orally every 12  2018      



       hrs        

 

 Levemir  ND  [id number]  100 UNIT/ML  May 08,    Active  50 units



       Subcutaneous  2018      



       once daily        

 

 NovoFine Plus  NDC  89098402631  32G X 4 MM SC      Active  as directed



       twice daily        

 

 Trimethoprim  NDC  71057749613  100 mg Orally    Active  1 tablet



       daily  2019    

 

 Citalopram  NDC  [id number]  10 MG      Active  TOME CARLOS



 Hydrobromide              TABLETA POR



               VIA ORAL



               ONCE A DAY

 

 NovoFine Plus  NDC  37059328525  32G X 4 MM SC      Active  as directed



       twice daily        

 

 Zocor  NDC  [id number]  80 MG      Active  TOME CARLOS



               TABLETA



               TODOS LOS



               PADRON

 

 Macrobid  NDC  [id number]  100 MG Orally  ,    Active  1 capsule



       every 12 hrs  2018      with food

 

 Toprol XL  NDC  [id number]  25 MG      Active  TAKE 1



               TABLET BY



               MOUTH DAILY

 

 Macrobid  NDC  [id number]  100 MG Orally  ,    Active  1 capsule



       every 12 hrs  2018      with food

 

 Cetirizine HCl  NDC  73870531728  10 MG Orally  Dec 27,    Active  1 tablet



       Once a day  2018      

 

 Flonase  ND  68773073757  50 MCG/ACT      Active  1 spray in



       Nasally Once a        each



       day        nostril

 

 Cozaar  NDC  85781497826  25 MG Orally      Active  1 tablet



       Once a day        

 

 Tradjenta  NDC  84043277665  5 MG      Active  TOME CARLOS



               TABLETA



               TODOS LOS



               PADRON

 

 Omeprazole  NDC  09335503828  40 MG Orally      Active  1 capsule



       Once a day        

 

 Zantac  NDC  [id number]  150 MG      Active  TOME CARLOS



               TABLETA POR



               VIA ORAL



               DOS VECES



               AL JAN

 

 Aspir-81  NDC  22090153845  81 MG Orally      Active  1 tablet



       Once a day        

 

 Metoprolol  NDC  69642824962  25 MG Orally      Active  1 tablet



 Succinate ER      Once a day        







Results







 Name  Result  Date  Reference Range  Unit  Abnormality Flag

 

 URINALYSIS AUTO W/O SCOPE          



 (59871)          

 

 ----NIT  neg  2019      

 

 ----URO  0.2  2019      

 

 ----PROTEIN  2+  2019      

 

 ----pH  5.0  2019      

 

 ----BLO  1+  2019      

 

 ----GLUCOSE  neg  2019      

 

 ----EM  neg  2019      

 

 ----BILIRUBIN  neg  2019      

 

 ----KETONES  neg  2019      

 

 ----SPECIFIC GRAVITY  1.020  2019      







Summary Purpose

eClinicalWorks Submission

## 2019-08-26 NOTE — XMS REPORT
[facility]

 Created on:2019



Patient:Cecilia Lancaster

Sex:Female

:1935

External Reference #:[id number]





Demographics







 Address  [address] [place] [address]

 

 Phone  [phone]

 

 Preferred Language  es

 

 Marital Status  Unknown

 

 [employer] Affiliation  Unknown

 

 Race  

 

 Ethnic Group  Unknown









Author







 Organization  eClinicalWorks









Care Team Providers







 Name  Role  Phone

 

 Lopez, Na  Provider Role  Unavailable









Allergies, Adverse Reactions, Alerts







 Substance  Reaction  Event Type

 

 N.K.D.A.  Info Not Available  Non Drug Allergy







Problems







 Problem Type  Condition  Code  Onset Dates  Condition Status

 

 Assessment  Rib pain on right side  R07.81    Active

 

 Assessment  History of recent fall  Z91.81    Active

 

 Problem  DDD (degenerative disc disease),  M51.37    Active



   lumbosacral      

 

 Problem  DJD (degenerative joint disease),  M15.9    Active



   multiple sites      

 

 Problem  Trigger finger  M65.30    Active

 

 Problem  Arteriosclerosis of coronary artery  I25.10    Active

 

 Problem  Hyperlipidemia, mixed  E78.2    Active

 

 Problem  Anemia in chronic illness  D63.8    Active

 

 Problem  Diverticulosis of colon  K57.30    Active

 

 Problem  Type 2 diabetes mellitus without  E11.9    Active



   complications      

 

 Problem  Primary osteoarthritis of both hips  M16.0    Active

 

 Problem  Benign essential HTN  I10    Active

 

 Problem  Decreased hearing, unspecified  H91.90    Active



   laterality      

 

 Problem  Hyperglycemia  R73.9    Active

 

 Problem  Fatty liver  K76.0    Active

 

 Problem  Seasonal allergies  J30.2    Active

 

 Problem  Acute on chronic diastolic heart  I50.33    Active



   failure      

 

 Problem  Osteoporosis  M81.0    Active

 

 Problem  Diabetes  E11.9    Active

 

 Problem  Unsteady gait  R26.81    Active

 

 Problem  Vitamin B12 deficiency  E53.8    Active

 

 Problem  Right upper quadrant abdominal pain  R10.11    Active

 

 Problem  Posterior auricular lymphadenopathy  R59.0    Active

 

 Problem  Mixed hyperlipidemia  E78.2    Active

 

 Problem  Urinary tract infection without  N39.0    Active



   hematuria, site unspecified      

 

 Assessment  Recurrent UTI  N39.0    Active

 

 Problem  Thrombocytopenia  D69.6    Active

 

 Assessment  Acute right-sided low back pain  M54.5    Active



   without sciatica      

 

 Problem  Arthritis  M19.90    Active

 

 Problem  GERD (gastroesophageal reflux  K21.9    Active



   disease)      

 

 Assessment  Unsteady gait  R26.81    Active

 

 Problem  CKD (chronic kidney disease) stage  N18.3    Active



   3, GFR 30-59 ml/min      

 

 Problem  Chronic renal disease  N18.9    Active

 

 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



   conditions      

 

 Problem  Depression with anxiety  F41.8    Active

 

 Problem  Long term current use of insulin  Z79.4    Active







Medications







 Medication  Code  Code  Instructions  Start  End  Status  Dosage



   System      Date  Date    

 

 Citalopram  NDC  [id number]  10 MG      Active  TOME CARLOS



 Hydrobromide              TABLETA POR



               VIA ORAL



               ONCE A DAY

 

 NovoLog Flexpen  ND  [id number]  100 UNIT/ML      Active  as directed



       Subcutaneous 5        



       units before        



       lunch and        



       dinner        

 

 Furosemide  NDC  94420944383  40 MG Orally  Dec 06,    Active  1 tablet



       Once a day        

 

 NovoFine Plus  NDC  [id number]  32G X 4 MM SC      Active  as directed



       twice daily        

 

 BD Pen Needle  NDC  [id number]  31G X 8 MM      Active  USE WITH



 Short U/F              PEN 2 TIMES



               A DAY

 

 Macrobid  NDC  [id number]  100 MG Orally  ,    Active  1 capsule



       every 12 hrs  2018      with food

 

 Promethazine HCl  NDC  30669731590  25 MG Orally      Active  1 tablet as



       every 6 hrs        needed

 

 Tradjenta  NDC  94991940857  5 MG      Active  TOME CARLOS



               TABLETA



               TODOS LOS



               PADRON

 

 Cetirizine HCl  NDC  [id number]  10 MG Orally  Dec 27,    Active  1 tablet



       Once a day  2018      

 

 Macrobid  NDC  [id number]  100 MG Orally  ,    Active  1 capsule



       every 12 hrs  2018      with food

 

 Omeprazole  NDC  04673917962  40 MG Orally      Active  1 capsule



       Once a day        

 

 Flonase  ND  18509220432  50 MCG/ACT      Active  1 spray in



       Nasally Once a        each



       day        nostril

 

 Zocor  NDC  [id number]  80 MG      Active  TOME CARLOS



               TABLETA



               TODOS LOS



               PADRON

 

 Toprol XL  NDC  [id number]  25 MG      Active  TAKE 1



               TABLET BY



               MOUTH DAILY

 

 Levemir  ND  [id number]  100 UNIT/ML  May 08,    Active  50 units



       Subcutaneous  2018      



       once daily        

 

 Zantac  ND  [id number]  150 MG      Active  TOME CARLOS



               TABLETA POR



               VIA ORAL



               DOS VECES



               AL JAN

 

 Simvastatin  ND  [id number]  80 MG Orally      Active  1 tablet in



       Once a day        the evening

 

 Cozaar  NDC  [id number]  25 MG Orally      Active  1 tablet



       Once a day        

 

 Augmentin  ND  [id number]  875-125 MG  Oct 30,    Active  1 tablet



       Orally every 12  2018      



       hrs        

 

 Aspir-81  NDC  [id number]  81 MG Orally      Active  1 tablet



       Once a day        

 

 Macrobid  NDC  [id number]  100 MG Orally  ,  ,  Active  1 capsule



       every 12 hrs  2019    with food

 

 Metoprolol  NDC  [id number]  25 MG Orally      Active  1 tablet



 Succinate ER      Once a day        







Results

No Known Results



Summary Purpose

eClinicalWorks Submission

## 2019-08-26 NOTE — XMS REPORT
[facility]

 Created on:2018



Patient:Cecilia Lancaster

Sex:Female

:1935

External Reference #:[id number]





Demographics







 Address  [address] [place] [address]

 

 Phone  [phone]

 

 Preferred Language  es

 

 Marital Status  Unknown

 

 Tenriism Affiliation  Unknown

 

 Race  

 

 Ethnic Group  Unknown









Author







 Organization  eClinicalWorks









Care Team Providers







 Name  Role  Phone

 

 Lopez, Na  Provider Role  Unavailable









Allergies, Adverse Reactions, Alerts







 Substance  Reaction  Event Type

 

 N.K.D.A.  Info Not Available  Non Drug Allergy







Problems







 Problem Type  Condition  Code  Onset Dates  Condition Status

 

 Assessment  Right upper quadrant abdominal pain  R10.11    Active

 

 Assessment  Posterior auricular lymphadenopathy  R59.0    Active

 

 Assessment  Long term current use of insulin  Z79.4    Active

 

 Assessment  CKD (chronic kidney disease) stage  N18.3    Active



   3, GFR 30-59 ml/min      

 

 Assessment  Hyperglycemia  R73.9    Active

 

 Assessment  Type 2 diabetes mellitus without  E11.9    Active



   complications      

 

 Problem  Benign essential HTN  I10    Active

 

 Problem  Vitamin B12 deficiency  E53.8    Active

 

 Problem  Hyperlipidemia, mixed  E78.2    Active

 

 Problem  Diabetes  E11.9    Active

 

 Problem  Anemia in chronic illness  D63.8    Active

 

 Problem  Osteoporosis  M81.0    Active

 

 Problem  Diverticulosis of colon  K57.30    Active

 

 Problem  Chronic renal disease  N18.9    Active

 

 Problem  Long term current use of insulin  Z79.4    Active

 

 Problem  Posterior auricular lymphadenopathy  R59.0    Active

 

 Problem  Hyperglycemia  R73.9    Active

 

 Problem  CKD (chronic kidney disease) stage  N18.3    Active



   3, GFR 30-59 ml/min      

 

 Problem  Depression with anxiety  F41.8    Active

 

 Problem  Urinary tract infection without  N39.0    Active



   hematuria, site unspecified      

 

 Problem  GERD (gastroesophageal reflux  K21.9    Active



   disease)      

 

 Problem  Decreased hearing, unspecified  H91.90    Active



   laterality      

 

 Problem  Primary osteoarthritis of both hips  M16.0    Active

 

 Problem  Fatty liver  K76.0    Active

 

 Problem  Right upper quadrant abdominal pain  R10.11    Active

 

 Assessment  Fatty liver  K76.0    Active

 

 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



   conditions      

 

 Assessment  Primary osteoarthritis of both hips  M16.0    Active

 

 Problem  Type 2 diabetes mellitus without  E11.9    Active



   complications      

 

 Problem  Thrombocytopenia  D69.6    Active

 

 Problem  Arthritis  M19.90    Active

 

 Problem  Trigger finger  M65.30    Active

 

 Problem  Arteriosclerosis of coronary artery  I25.10    Active

 

 Problem  DDD (degenerative disc disease),  M51.37    Active



   lumbosacral      

 

 Problem  DJD (degenerative joint disease),  M15.9    Active



   multiple sites      







Medications







 Medication  Code  Code  Instructions  Start  End  Status  Dosage



   System      Date  Date    

 

 Tradjenta  NDC  [id number]  5 MG      Active  TOME CARLOS



               TABLETA



               TODOS LOS



               PADRON

 

 Zantac  NDC  [id number]  150 MG      Active  TOME CARLOS



               TABLETA



               POR VIA



               ORAL DOS



               VECES AL



               JAN

 

 Metoprolol  NDC  [id number]  25 MG Orally      Active  1 tablet



 Succinate ER      Once a day        

 

 Flonase  NDC  [id number]  50 MCG/ACT      Active  1 spray in



       Nasally Once a        each



       day        nostril

 

 Toprol XL  NDC  31144497527  25 MG      Active  TAKE 1



               TABLET BY



               MOUTH



               DAILY

 

 Zocor  NDC  01552535020  80 MG      Active  TOME CARLOS



               TABLETA



               TODOS LOS



               PADRON

 

 Citalopram  NDC  49569653283  10 MG      Active  TOME CARLOS



 Hydrobromide              TABLETA



               POR VIA



               ORAL ONCE



               A DAY

 

 Cozaar  NDC  [id number]  25 MG Orally      Active  1 tablet



       Once a day        

 

 Aspir-81  NDC  54819550397  81 MG Orally      Active  1 tablet



       Once a day        

 

 Promethazine HCl  NDC  09408400967  25 MG Orally      Active  1 tablet



       every 6 hrs        as needed

 

 Levemir  NDC  [id number]  100 UNIT/ML  May 08,    Active  50 units



       Subcutaneous  2018      



       once daily        

 

 Simvastatin  NDC  47281735253  80 MG Orally      Active  1 tablet



       Once a day        in the



               evening

 

 Omeprazole  NDC  54943062215  40 MG Orally      Active  1 capsule



       Once a day        







Results

No Known Results



Summary Purpose

eClinicalWorks Submission

## 2019-08-26 NOTE — XMS REPORT
[facility]

 Created on:2019



Patient:Cecilia Lancaster

Sex:Female

:1935

External Reference #:[id number]





Demographics







 Address  [address] [place] [address]

 

 Phone  [phone]

 

 Preferred Language  es

 

 Marital Status  Unknown

 

 Christian Affiliation  Unknown

 

 Race  

 

 Ethnic Group  Unknown









Author







 Organization  [employer]









Care Team Providers







 Name  Role  Phone

 

 Virginia Salazar  Provider Role  Unavailable









Allergies, Adverse Reactions, Alerts







 Substance  Reaction  Event Type

 

 N.K.D.A.  Info Not Available  Non Drug Allergy







Problems







 Problem Type  Condition  Code  Onset Dates  Condition Status

 

 Problem  DDD (degenerative disc disease),  M51.37    Active



   lumbosacral      

 

 Problem  Trigger finger  M65.30    Active

 

 Problem  DJD (degenerative joint disease),  M15.9    Active



   multiple sites      

 

 Problem  Arteriosclerosis of coronary artery  I25.10    Active

 

 Problem  Hyperlipidemia, mixed  E78.2    Active

 

 Problem  Anemia in chronic illness  D63.8    Active

 

 Problem  Diverticulosis of colon  K57.30    Active

 

 Problem  Benign essential HTN  I10    Active

 

 Problem  Vitamin B12 deficiency  E53.8    Active

 

 Problem  Decreased hearing, unspecified  H91.90    Active



   laterality      

 

 Problem  Fatty liver  K76.0    Active

 

 Problem  Diabetes  E11.9    Active

 

 Problem  Hyperglycemia  R73.9    Active

 

 Problem  Right upper quadrant abdominal pain  R10.11    Active

 

 Problem  Posterior auricular lymphadenopathy  R59.0    Active

 

 Problem  Malignant neoplasm of urinary  C67.9    Active



   bladder, unspecified site      

 

 Problem  Unsteady gait  R26.81    Active

 

 Problem  CKD (chronic kidney disease) stage  N18.3    Active



   3, GFR 30-59 ml/min      

 

 Problem  GERD (gastroesophageal reflux  K21.9    Active



   disease)      

 

 Problem  Leukocytopenia, unspecified  D72.819    Active

 

 Problem  Osteoporosis  M81.0    Active

 

 Problem  Mixed hyperlipidemia  E78.2    Active

 

 Problem  Urinary tract infection without  N39.0    Active



   hematuria, site unspecified      

 

 Problem  Seasonal allergies  J30.2    Active

 

 Problem  Acute on chronic diastolic heart  I50.33    Active



   failure      

 

 Problem  Depression with anxiety  F41.8    Active

 

 Assessment  Recurrent UTI  N39.0    Active

 

 Problem  Long term current use of insulin  Z79.4    Active

 

 Problem  Thrombocytopenia  D69.6    Active

 

 Problem  Arthritis  M19.90    Active

 

 Problem  Type 2 diabetes mellitus without  E11.9    Active



   complications      

 

 Problem  Primary osteoarthritis of both hips  M16.0    Active

 

 Problem  Chronic renal disease  N18.9    Active

 

 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



   conditions      







Medications







 Medication  Code  Code  Instructions  Start  End  Status  Dosage



   System      Date  Date    

 

 Zocor  NDC  49215918237  80 MG      Active  TOME CARLOS



               TABLETA



               TODOS LOS



               PADRON

 

 Trimethoprim  NDC  [id number]  100 mg Orally    Active  1 tablet



       daily  2019    

 

 Metoprolol  NDC  [id number]  25 MG Orally      Active  1 tablet



 Succinate ER      Once a day        

 

 Cozaar  NDC  [id number]  25 MG Orally      Active  1 tablet



       Once a day        

 

 Citalopram  NDC  51484983376  10 MG      Active  TOME CARLOS



 Hydrobromide              TABLETA POR



               VIA ORAL



               ONCE A DAY

 

 Cetirizine HCl  NDC  [id number]  10 MG Orally  Dec 27,    Active  1 tablet



       Once a day        

 

 NovoFine Plus  NDC  [id number]  32G X 4 MM SC      Active  as directed



       twice daily        

 

 Macrobid  NDC  [id number]  100 MG Orally  ,    Active  1 capsule



       every 12 hrs        with food

 

 Promethazine HCl  NDC  [id number]  25 MG Orally      Active  1 tablet as



       every 6 hrs        needed

 

 Levemir  ND  [id number]  100 UNIT/ML  May 15,    Active  as directed



 FlexTouch      Subcutaneous 50  2019      



       units once a        



       day        

 

 NovoLog Flexpen  ND  [id number]  100 UNIT/ML      Active  as directed



       Subcutaneous 15        



       units before        



       lunch and        



       dinner        

 

 NovoFine Plus  NDC  [id number]  32G X 4 MM SC      Active  as directed



       twice daily        

 

 Premarin  NDC  [id number]  0.625 MG/GM  ,    Active  1 gm



       Vaginal twice  2019      



       weekly        

 

 BD Pen Needle  NDC  [id number]  31G X 8 MM      Active  USE WITH



 Short U/F              PEN 2 TIMES



               A DAY

 

 Tradjenta  NDC  69758156749  5 MG      Active  TOME CARLOS



               TABLETA



               TODOS LOS



               PADRON

 

 Macrobid  NDC  [id number]  100 MG Orally  ,    Active  1 capsule



       every 12 hrs        with food

 

 Aspir-81  NDC  [id number]  81 MG Orally      Active  1 tablet



       Once a day        

 

 Omeprazole  NDC  18532516036  40 MG Orally      Active  1 capsule



       Once a day        

 

 Toprol XL  NDC  75860174102  25 MG      Active  TAKE 1



               TABLET BY



               MOUTH DAILY

 

 Flonase  ND  80055009922  50 MCG/ACT      Active  1 spray in



       Nasally Once a        each



       day        nostril

 

 Levemir  ND  [id number]  100 UNIT/ML      Active  50 units



       Subcutaneous        



       once daily        

 

 Furosemide  NDC  52258212812  40 MG Orally      Active  1 tablet



       Once a day        

 

 Simvastatin  NDC  98068027137  80 MG Orally      Active  1 tablet in



       Once a day        the evening

 

 Zantac  NDC  97020118915  150 MG      Active  TOME CARLOS



               TABLETA POR



               VIA ORAL



               DOS VECES



               AL JAN

 

 Furosemide  NDC  [id number]  40 MG Orally      Active  1 tablet



       Once a day        







Results







 Name  Result  Date  Reference Range  Unit  Abnormality Flag

 

 UMIC with Reflex to Urine          



 Culture          

 

 ----Urine Yeast (Budding)  PRESENT  2019  NONE SEEN    AA

 

 ----Urine Yeast  MANY  2019  NONE SEEN    

 

 ----Urine Culture Reflex  REFLEXED  2019      



 Order          

 

 ----Urine WBC  <5  [id number]  <5    

 

 ----Urine RBC  NONE SEEN  2019  NONE SEEN    

 

 ----Urine Bacteria  NONE SEEN  [id number]  <20    

 

 ----Sqamous Epithelial  <5  [id number]  NONE SEEN    







Summary Purpose

[employer] Submission

## 2020-10-03 ENCOUNTER — HOSPITAL ENCOUNTER (INPATIENT)
Dept: HOSPITAL 97 - ER | Age: 85
LOS: 3 days | Discharge: HOME | DRG: 247 | End: 2020-10-06
Attending: HOSPITALIST | Admitting: INTERNAL MEDICINE
Payer: COMMERCIAL

## 2020-10-03 VITALS — BODY MASS INDEX: 27.2 KG/M2

## 2020-10-03 DIAGNOSIS — E11.22: ICD-10-CM

## 2020-10-03 DIAGNOSIS — I25.110: ICD-10-CM

## 2020-10-03 DIAGNOSIS — Z90.710: ICD-10-CM

## 2020-10-03 DIAGNOSIS — Z79.899: ICD-10-CM

## 2020-10-03 DIAGNOSIS — I48.91: ICD-10-CM

## 2020-10-03 DIAGNOSIS — Z90.49: ICD-10-CM

## 2020-10-03 DIAGNOSIS — N18.30: ICD-10-CM

## 2020-10-03 DIAGNOSIS — D64.9: ICD-10-CM

## 2020-10-03 DIAGNOSIS — D69.6: ICD-10-CM

## 2020-10-03 DIAGNOSIS — I50.32: ICD-10-CM

## 2020-10-03 DIAGNOSIS — I21.4: Primary | ICD-10-CM

## 2020-10-03 DIAGNOSIS — Z87.891: ICD-10-CM

## 2020-10-03 DIAGNOSIS — Z95.1: ICD-10-CM

## 2020-10-03 DIAGNOSIS — E78.5: ICD-10-CM

## 2020-10-03 DIAGNOSIS — I13.0: ICD-10-CM

## 2020-10-03 DIAGNOSIS — Z79.4: ICD-10-CM

## 2020-10-03 DIAGNOSIS — Z20.828: ICD-10-CM

## 2020-10-03 DIAGNOSIS — Z79.82: ICD-10-CM

## 2020-10-03 LAB
ALBUMIN SERPL BCP-MCNC: 3.6 G/DL (ref 3.4–5)
ALP SERPL-CCNC: 56 U/L (ref 45–117)
ALT SERPL W P-5'-P-CCNC: 12 U/L (ref 12–78)
AST SERPL W P-5'-P-CCNC: 13 U/L (ref 15–37)
BLD SMEAR INTERP: (no result)
BUN BLD-MCNC: 42 MG/DL (ref 7–18)
GLUCOSE SERPLBLD-MCNC: 291 MG/DL (ref 74–106)
HCT VFR BLD CALC: 28.2 % (ref 36–45)
INR BLD: 1.06
LYMPHOCYTES # SPEC AUTO: 1.1 K/UL (ref 0.7–4.9)
MAGNESIUM SERPL-MCNC: 2.1 MG/DL (ref 1.8–2.4)
MORPHOLOGY BLD-IMP: (no result)
NT-PROBNP SERPL-MCNC: 2360 PG/ML (ref ?–450)
PMV BLD: 10.9 FL (ref 7.6–11.3)
POTASSIUM SERPL-SCNC: 4.6 MMOL/L (ref 3.5–5.1)
RBC # BLD: 2.96 M/UL (ref 3.86–4.86)
TROPONIN (EMERG DEPT USE ONLY): 0.68 NG/ML (ref 0–0.04)

## 2020-10-03 PROCEDURE — 99285 EMERGENCY DEPT VISIT HI MDM: CPT

## 2020-10-03 PROCEDURE — 84439 ASSAY OF FREE THYROXINE: CPT

## 2020-10-03 PROCEDURE — 80048 BASIC METABOLIC PNL TOTAL CA: CPT

## 2020-10-03 PROCEDURE — 84484 ASSAY OF TROPONIN QUANT: CPT

## 2020-10-03 PROCEDURE — 36415 COLL VENOUS BLD VENIPUNCTURE: CPT

## 2020-10-03 PROCEDURE — 71045 X-RAY EXAM CHEST 1 VIEW: CPT

## 2020-10-03 PROCEDURE — 85025 COMPLETE CBC W/AUTO DIFF WBC: CPT

## 2020-10-03 PROCEDURE — 90471 IMMUNIZATION ADMIN: CPT

## 2020-10-03 PROCEDURE — 80076 HEPATIC FUNCTION PANEL: CPT

## 2020-10-03 PROCEDURE — 82947 ASSAY GLUCOSE BLOOD QUANT: CPT

## 2020-10-03 PROCEDURE — 96374 THER/PROPH/DIAG INJ IV PUSH: CPT

## 2020-10-03 PROCEDURE — 84443 ASSAY THYROID STIM HORMONE: CPT

## 2020-10-03 PROCEDURE — 80061 LIPID PANEL: CPT

## 2020-10-03 PROCEDURE — 93459 L HRT ART/GRFT ANGIO: CPT

## 2020-10-03 PROCEDURE — 93306 TTE W/DOPPLER COMPLETE: CPT

## 2020-10-03 PROCEDURE — 85610 PROTHROMBIN TIME: CPT

## 2020-10-03 PROCEDURE — 83036 HEMOGLOBIN GLYCOSYLATED A1C: CPT

## 2020-10-03 PROCEDURE — 93005 ELECTROCARDIOGRAM TRACING: CPT

## 2020-10-03 PROCEDURE — 83880 ASSAY OF NATRIURETIC PEPTIDE: CPT

## 2020-10-03 PROCEDURE — 85347 COAGULATION TIME ACTIVATED: CPT

## 2020-10-03 PROCEDURE — 83735 ASSAY OF MAGNESIUM: CPT

## 2020-10-03 PROCEDURE — 90732 PPSV23 VACC 2 YRS+ SUBQ/IM: CPT

## 2020-10-03 NOTE — RAD REPORT
EXAM DESCRIPTION:  RAD - Chest Single View - 10/3/2020 9:25 pm

 

CLINICAL HISTORY:  CHEST PAIN

Chest pain.

 

COMPARISON:  Chest Single View dated 8/18/2018; Chest Single View dated 8/16/2018; Chest Pa And Lat (
2 Views) dated 9/7/2017; CHEST SINGLE VIEW dated 3/10/2016

 

FINDINGS:  Portable technique limits examination quality.

 

Mild interstitial pulmonary edema is noted. The heart is moderately enlarged in size with sternotomy 
wires present. No displaced fractures.

 

IMPRESSION:  Mild CHF.

## 2020-10-03 NOTE — EDPHYS
Physician Documentation                                                                           

 [facility]                                                                 

Name: Cecilia Lancaster                                                                                  

Age: 84 yrs                                                                                       

Sex: Female                                                                                       

: 1935                                                                                   

MRN: [id number]                                                                                   

Arrival Date: 10/03/2020                                                                          

Time: 19:28                                                                                       

Account#: [id number]                                                                            

Bed 17                                                                                            

Private MD:                                                                                       

ED Physician Marshall Rose                                                                      

HPI:                                                                                              

10/03                                                                                             

20:21 This 84 yrs old  Female presents to ER via Wheelchair with complaints of Chest  mh7 

      Pain, Chest Pain > 31 y/o.                                                                  

20:21 The patient or guardian reports chest pain that is located primarily in the substernal  mh7 

      area. Onset: today. The pain radiates to left back. Associated signs and symptoms:          

      Pertinent negatives: abdominal pain, cough, diaphoresis, dizziness, headache, lower         

      extremity pain, lower extremity swelling, lightheadedness, nausea, near syncope,            

      palpitations, recent travel, shortness of breath, syncope, vomiting. The chest pain is      

      described as a heaviness. Duration: The patient or guardian reports multiple episodes,      

      that are intermittent, that wax and wane, with no pattern. Modifying factors: The           

      symptoms are alleviated by nothing. the symptoms are aggravated by nothing. Severity of     

      pain: At its worst the pain was moderate today, in the emergency department the pain        

      has improved moderately.                                                                    

                                                                                                  

Historical:                                                                                       

- Allergies:                                                                                      

20:03 NKA;                                                                                    ca1 

- Home Meds:                                                                                      

20:03 Novolog 100 unit/mL Sub-Q soln [Active]; Levemir 100 unit/mL subcutaneous soln          ca1 

      [Active]; aspirin 81 mg Oral TbEC [Active]; losartan 25 mg Oral tab [Active];               

      nitroglycerin 0.4 Oral 1 cap [Active]; citalopram 10 mg tab [Active];                       

- PMHx:                                                                                           

20:03 Depression; Diabetes - IDDM; Hyperlipidemia; Hypertension; stage 3 renal disease;       ca1 

- PSHx:                                                                                           

20:03 CABG;                                                                                   ca1 

                                                                                                  

- Immunization history:: Adult Immunizations up to date, Flu vaccine is not up to date.           

- Social history:: Smoking status: Patient denies any tobacco usage or history of.                

                                                                                                  

                                                                                                  

ROS:                                                                                              

20:21 Constitutional: Negative for fever, chills, and weight loss, Eyes: Negative for injury, mh7 

      pain, redness, and discharge, Neck: Negative for injury, pain, and swelling.                

20:21 ENT: Negative for injury, pain, and discharge, Respiratory: Negative for shortness of       

      breath, cough, wheezing, and pleuritic chest pain, Abdomen/GI: Negative for abdominal       

      pain, nausea, vomiting, diarrhea, and constipation, Back: Negative for injury and pain,     

      : Negative for injury, bleeding, discharge, and swelling, MS/Extremity: Negative for      

      injury and deformity, Skin: Negative for injury, rash, and discoloration, Neuro:            

      Negative for headache, weakness, numbness, tingling, and seizure, Psych: Negative for       

      depression, anxiety, suicide ideation, homicidal ideation, and hallucinations,              

      Allergy/Immunology: Negative for hives, rash, and allergies, Endocrine: Negative for        

      neck swelling, polydipsia, polyuria, polyphagia, and marked weight changes,                 

      Hematologic/Lymphatic: Negative for swollen nodes, abnormal bleeding, and unusual           

      bruising.                                                                                   

                                                                                                  

Exam:                                                                                             

20:21 Constitutional:  This is a well developed, well nourished patient who is awake, alert,  mh7 

      and in no acute distress. Head/Face:  Normocephalic, atraumatic. Eyes:  Pupils equal        

      round and reactive to light, extra-ocular motions intact.  Lids and lashes normal.          

      Conjunctiva and sclera are non-icteric and not injected.  Cornea within normal limits.      

      Periorbital areas with no swelling, redness, or edema. Neck:  Trachea midline, no           

      thyromegaly or masses palpated, and no cervical lymphadenopathy.  Supple, full range of     

      motion without nuchal rigidity, or vertebral point tenderness.  No Meningismus.             

      Chest/axilla:  Normal chest wall appearance and motion.  Nontender with no deformity.       

      No lesions are appreciated.                                                                 

20:21 Respiratory:  Lungs have equal breath sounds bilaterally, clear to auscultation and         

      percussion.  No rales, rhonchi or wheezes noted.  No increased work of breathing, no        

      retractions or nasal flaring. Abdomen/GI:  Soft, non-tender, with normal bowel sounds.      

      No distension or tympany.  No guarding or rebound.  No evidence of tenderness               

      throughout. Back:  No spinal tenderness.  No costovertebral tenderness.  Full range of      

      motion. Skin:  Warm, dry with normal turgor.  Normal color with no rashes, no lesions,      

      and no evidence of cellulitis. MS/ Extremity:  Pulses equal, no cyanosis.                   

      Neurovascular intact.  Full, normal range of motion. Neuro:  Awake and alert, GCS 15,       

      oriented to person, place, time, and situation.  Cranial nerves II-XII grossly intact.      

      Motor strength 5/5 in all extremities.  Sensory grossly intact.  Cerebellar exam            

      normal.  Normal gait. Psych:  Awake, alert, with orientation to person, place and time.     

       Behavior, mood, and affect are within normal limits.                                       

20:21 Constitutional: The patient appears                                                         

20:21 Cardiovascular: Rate: bradycardic, Rhythm: regular, Pulses: no pulse deficits are           

      appreciated, Heart sounds: normal, normal S1and S2, Edema: is not appreciated, JVD: is      

      not appreciated.                                                                            

                                                                                                  

Vital Signs:                                                                                      

19:44  / 48; Pulse 51; Resp 17 S; Temp 97.6(TE); Pulse Ox 99% on R/A; Weight 78.47 kg   ca1 

      (R); Height 5 ft. 7 in. (170.18 cm) (R); Pain 0/10;                                         

20:59  / 54; Pulse 50; Resp 18; Pulse Ox 99% ;                                          ll2 

21:55  / 61; Pulse 53; Resp 14; Pulse Ox 99% on R/A;                                    ll2 

23:00  / 55; Pulse 44; Resp 15; Pulse Ox 99% on R/A;                                    ll2 

23:00  / 94; Pulse 47; Resp 14; Pulse Ox 99% on R/A;                                    ll2 

10/04                                                                                             

00:00  / 94; Pulse 45; Resp 11; Pulse Ox 99% on R/A;                                    ll2 

10/03                                                                                             

19:44 Body Mass Index 27.10 (78.47 kg, 170.18 cm)                                             ca1 

                                                                                                  

MDM:                                                                                              

10/03                                                                                             

20:12 Patient medically screened.                                                             7 

21:27 Differential diagnosis: abnormal EKG, acute myocardial infarction, acute pericarditis,  mh7 

      anxiety, coronary artery disease chest wall pain, congestive heart failure                  

      costochondritis, myocarditis, peptic ulcer disease, pneumonia, pneumothorax, pulmonary      

      embolus. HEART Score: History: Moderately Suspicious (1), ECG: Non specific                 

      repolarization disturbance / LBTB / PM (1), Age: > or = 65 years (2), Risk Factors: >       

      or = 3 Risk factors for atherosclerotic disease (2), [Hypercholesterolemia]                 

      [Hypertension] [DM] Troponin: > 1 and < 3 x normal limit (1), Total Score = 7. The          

      patient was given aspirin in the Emergency Department. Data reviewed: vital signs,          

      nurses notes, lab test result(s), cardiac enzymes, CBC, electrolytes, urinalysis, EKG,      

      radiologic studies, plain films. Data interpreted: Pulse oximetry: on room air is 99 %.     

      Interpretation: normal. Counseling: I had a detailed discussion with the patient and/or     

      guardian regarding: the historical points, exam findings, and any diagnostic results        

      supporting the discharge/admit diagnosis, the presence of at least one elevated blood       

      pressure reading (>120/80) during this emergency department visit, lab results,             

      radiology results, the need for further work-up and treatment in the hospital. Response     

      to treatment: the patient's symptoms have mildly improved after treatment.                  

                                                                                                  

10/03                                                                                             

20:12 Order name: Basic Metabolic Panel; Complete Time: 21:17                                 mh7 

10/03                                                                                             

20:12 Order name: CBC with Diff; Complete Time: 22:51                                         mh7 

10/03                                                                                             

20:12 Order name: LFT's; Complete Time: 21:17                                                 mh7 

10/03                                                                                             

20:12 Order name: Magnesium; Complete Time: 21:17                                             mh7 

10/03                                                                                             

20:12 Order name: NT PRO-BNP; Complete Time: 21:17                                            mh7 

10/03                                                                                             

20:12 Order name: PT-INR; Complete Time: 21:17                                                mh7 

10/03                                                                                             

20:12 Order name: Troponin (emerg Dept Use Only); Complete Time: 21:17                        mh7 

10/03                                                                                             

20:12 Order name: XRAY Chest (1 view); Complete Time: 22:51                                   mh7 

10/03                                                                                             

21:00 Order name: CBC Smear Scan; Complete Time: 22:51                                        EDMS

10/03                                                                                             

23:17 Order name: SARS-COV-2 RT PCR                                                           EDMS

10/03                                                                                             

20:12 Order name: EKG; Complete Time: 20:13                                                   7 

10/03                                                                                             

20:12 Order name: Cardiac monitoring; Complete Time: 20:28                                    mh7 

10/03                                                                                             

20:12 Order name: EKG - Nurse/Tech; Complete Time: 20:28                                      7 

10/03                                                                                             

20:12 Order name: IV Saline Lock; Complete Time: 20:28                                        7 

10/03                                                                                             

20:12 Order name: Labs collected and sent; Complete Time: 20:28                               7 

10/03                                                                                             

20:12 Order name: O2 Per Protocol; Complete Time: 20:28                                       7 

10/03                                                                                             

20:12 Order name: O2 Sat Monitoring; Complete Time: 20:28                                     mh7 

                                                                                                  

Administered Medications:                                                                         

21:41 Drug: Aspirin Chewable Tablet 243 mg {Note: pt took 81mg at home this AM.} Route: PO;   ea  

22:45 Follow up: Response: No adverse reaction                                                ll2 

21:42 Drug: Lasix 20 mg Route: IVP; Site: right forearm;                                      ea  

22:45 Follow up: Response: No adverse reaction                                                ll2 

22:45 Follow up: Response: No adverse reaction                                                ll2 

                                                                                                  

                                                                                                  

Disposition:                                                                                      

10/03/20 21:30 Hospitalization ordered by Justin Tanner for Inpatient Admission.               

  Preliminary diagnosis are Chest Pain, New Onset CHF.                                            

- Bed requested for Telemetry/MedSurg (Inpatient).                                                

- Status is Inpatient Admission.                                                              ll2 

- Condition is Stable.                                                                            

- Problem is new.                                                                                 

- Symptoms have improved.                                                                         

                                                                                                  

                                                                                                  

                                                                                                  

Signatures:                                                                                       

Dispatcher [employer]                           EDMS                                                 

Osbaldo Wood, FNP-C                      FNP-Cla1                                                  

Daysi Stephens RN                       RN   cg                                                   

Paty Naranjo RN RN ea Acob, Cheryl, RN RN   ca1                                                  

Dori Pantoja RN RN   2                                                  

Marshall Rose MD MD   7                                                  

                                                                                                  

Corrections: (The following items were deleted from the chart)                                    

23:17 23:06 CORONAVIRUS+MR.LAB.RADHAZ ordered. [facility]

10/04                                                                                             

00:36 10/03 21:30 Hospitalization Ordered by Justin Tanner MD for Inpatient Admission.      cg  

      Preliminary diagnosis is Chest Pain; New Onset CHF. Bed requested for Telemetry/MedSurg     

      (Inpatient). Status is Inpatient Admission. Condition is Stable. Problem is new.            

      Symptoms have improved. [facility]                                                                 

10/04                                                                                             

01:11 00:36 10/03/2020 21:30 Hospitalization Ordered by Justin Tanner MD for Inpatient      ll2 

      Admission. Preliminary diagnosis is Chest Pain; New Onset CHF. Bed requested for            

      Telemetry/MedSurg (Inpatient). Status is Inpatient Admission. Condition is Stable.          

      Problem is new. Symptoms have improved. cg                                                  

                                                                                                  

**************************************************************************************************

## 2020-10-03 NOTE — P.HP
Certification for Inpatient


Patient admitted to: Inpatient


With expected LOS: >2 Midnights


Patient will require the following post-hospital care: None


Practitioner: I am a practitioner with admitting privileges, knowledge of 

patient current condition, hospital course, and medical plan of care.


Services: Services provided to patient in accordance with Admission requirements

found in Title 42 Section 412.3 of the Code of Federal Regulations





<GeorgeOsbaldo epperson - Last Filed: 10/03/20 22:54>





Patient History


Date of Service: 10/03/20


Primary Care Provider: Dr. Lopez


Reason for admission: NSTEMI


History of Present Illness: 





84-year-old  female with history of diabetes mellitus type 2, 

hypertension, and hyperlipidemia, coronary artery disease status post 3 vessel 

CABG, chronic kidney disease stage 3 presents emergency department for unstable 

angina.  Patient reports she had multiple episodes of chest pain today there 

were relieved with multiple doses of nitroglycerin.  Patient was evaluated in 

the emergency department and found to have elevated troponin at 0.68.  BNP also 

elevated at 2360. Chest SA suggest mild CHF.  Labs also notable for hemoglobin 

9.5, hematocrit 28.2, platelet count 93. Blood glucose 291, creatinine 1.6, GFR 

31. EKG without any acute findings, patient chest pain free at this time.  ED 

provider wishes to admit patient for further evaluation and management.  Patient

had heart catheterization in 2016 without stent placement,  recommendation for 

continued medical therapy.  Last echocardiogram 2018 shows ejection fraction 

53%.





When I saw the patient in the emergency department she is awake, alert, oriented

x3.  Patient in no respiratory distress, denies chest pain at this time.  

Patient adamant that she would like to leave time explain multiple times that 

she would benefit from staying for further evaluation and management.  Patient 

eventually amendable to  staying.  Patient be admitted for further evaluation 

and management. 





- Past Medical/Surgical History


Diabetic: Yes


-:   Dyslipidemia


-:  depression


-:  type 2 diabetes


-:  coronary artery disease


-:  hypertension


-: hysterectomy


-: cholecystectomy


-: appendectomy


-: lens implant on right eye


-: bilateral cataracts


Psychosocial/ Personal History: Patient currently lives at home with her family.





- Family History


  ** Mother


-: Cancer


Notes: pancreatic cancer





- Social History


Smoking Status: Former smoker


Alcohol use: No


CD- Drugs: No


Caffeine use: Yes


Place of Residence: Home





<Osbaldo Wood - Last Filed: 10/03/20 22:54>


Date of Service: 10/04/20





<FloresJustin ELIAS - Last Filed: 10/04/20 10:16>


Allergies





No Known Allergies Allergy (Verified 10/04/20 04:54)


   





Home Medications: 








Aspirin [Aspirin EC 81 MG] 81 mg PO DAILY 08/16/18 


Cetirizine HCl [Zyrtec] 10 mg PO DAILY 08/16/18 


Citalopram Hydrobromide [Citalopram HBr] 10 mg PO BEDTIME 08/16/18 


Insulin Aspart [Novolog Flexpen] 20 units SQ SEECOM 08/16/18 


Insulin Detemir [Levemir Flextouch] 50 unit SQ BEDTIME 08/16/18 


Isosorbide Mononitrate [Isosorbide Mononitrate ER] 30 mg PO DAILY 08/16/18 


Simvastatin 80 mg PO BEDTIME 08/16/18 


Cholecalciferol (Vitamin D3) [Vitamin D3] 1 cap PO DAILY 10/04/20 


Famotidine [Pepcid] 40 mg OP DAILY 10/04/20 


Furosemide [Lasix*] 40 mg PO DAILY 10/04/20 


Metoprolol Tartrate [Lopressor*] 25 mg PO DAILY 10/04/20 


Nitroglycerin [Nitrostat] 0.4 mg SL PRN PRN 10/04/20 








Review of Systems


10-point ROS is otherwise unremarkable


Cardiovascular: Chest Pain





<Osbaldo Wood - Last Filed: 10/03/20 22:54>





Physical Examination





- Physical Exam


General: Alert, In no apparent distress, Oriented x3


HEENT: Atraumatic, Normocephalic, PERRLA


Neck: Supple


Respiratory: Clear to auscultation bilaterally, Normal air movement


Cardiovascular: Normal S1 S2, Edema (Mild nonpitting edema bilateral lower 

extremities), Irregular heart rate/rhythm (Atrial fibrillation, rate controlled)


Capillary refill: <2 Seconds


Gastrointestinal: Normal bowel sounds


Musculoskeletal: No contractures, No erythema, No tenderness


Integumentary: No tenderness/swelling, No erythema, No warmth


Neurological: Normal speech, Normal strength at 5/5 x4 extr, Normal tone, 

Sensation intact





- Studies


Laboratory Data (last 24 hrs)





10/03/20 20:27: PT 12.5, INR 1.06


10/03/20 20:27: WBC 4.8, Hgb 9.5 L, Hct 28.2 L, Plt Count 93 L


10/03/20 20:27: Sodium 139, Potassium 4.6, BUN 42 H, Creatinine 1.60 H, Glucose 

291 H, Magnesium 2.1, Total Bilirubin 0.5, AST 13 L, ALT 12, Alkaline 

Phosphatase 56








<Osbaldo Wood - Last Filed: 10/03/20 22:54>





- Studies


Laboratory Data (last 24 hrs)





10/03/20 20:27: PT 12.5, INR 1.06


10/03/20 20:27: WBC 4.8, Hgb 9.5 L, Hct 28.2 L, Plt Count 93 L


10/03/20 20:27: Sodium 139, Potassium 4.6, BUN 42 H, Creatinine 1.60 H, Glucose 

291 H, Magnesium 2.1, Total Bilirubin 0.5, AST 13 L, ALT 12, Alkaline 

Phosphatase 56








<Justin Tanner - Last Filed: 10/04/20 10:16>





Assessment and Plan





- Plan


Assessment


NSTEMI


Atrial fibrillation not on chronic anticoagulation therapy


Coronary artery disease S/P three-vessel CABG


Diabetes mellitus type 2


Hypertension


Hyperlipidemia


CKD III


Normocytic anemia


Thrombocytopenia





Plan


NSTEMI:  Continue with Lovenox 1 milligram/kilogram twice daily, cardiology 

consult in place.  Aspirin, beta-blocker, statin.  Echocardiogram ordered, BNP 

elevated and chest x-ray showing mild CHF will continue gentle diuresis.  Lipid 

panel and thyroid panel with morning labs.


Atrial fibrillation not on chronic anticoagulation therapy:  Daughter states 

patient takes aspirin daily and apparently PCP was looking in to initiating 

anticoagulant therapy.  Will need to review with cardiology.


Coronary artery disease S/P three-vessel CABG:  Cardiology consult in place, 

continue with aspirin, Lovenox, beta-blocker, statin.


Diabetes mellitus type 2:  A.c. HS Accu-Cheks, sliding scale insulin therapy.  

Will obtain A1c with morning labs.


Hypertension:  Obtain and continue home medications.


Hyperlipidemia:  Obtain and continue home medications.


CKD III:  Appears stable at this time, will continue gentle diuresis.


Normocytic anemia:  Will continue to monitor hemoglobin hematocrit, transfuse as

 necessary.  MCV at the high end of normal limits.  Doubt iron-deficiency.


Thrombocytopenia:  Platelet count 93, continue to monitor.  Continue Lovenox at 

this time.  No signs of active bleeding.


Discharge Plan: Home


Plan to discharge in: 48 Hours





- Advance Directives


Does patient have a Living Will: No


Does patient have a Durable POA for Healthcare: Yes





- Code Status/Comfort Care


Code Status Assessed: Yes (Patient is full code)


Critical Care: No


Time Spent Managing Pts Care (In Minutes): 55





<Osbaldo Wood - Last Filed: 10/03/20 22:54>





- Problems (Diagnosis)


(1) Non-ST elevation MI (NSTEMI)


Current Visit: Yes   Status: Acute   





<Justin Tanner - Last Filed: 10/04/20 10:16>

## 2020-10-03 NOTE — ER
Nurse's Notes                                                                                     

 [facility]                                                                 

Name: Cecilia Lancaster                                                                                  

Age: 84 yrs                                                                                       

Sex: Female                                                                                       

: 1935                                                                                   

MRN: [id number]                                                                                   

Arrival Date: 10/03/2020                                                                          

Time: 19:28                                                                                       

Account#: [id number]                                                                            

Bed 17                                                                                            

Private MD:                                                                                       

Diagnosis: Chest Pain;New Onset CHF                                                               

                                                                                                  

Presentation:                                                                                     

10/03                                                                                             

19:44 Chief complaint: Patient states: Chest pain started around 1500 today. Took Nitro SL x  ca1 

      2 around 6141-7244. No relief. Took ASA x 1 at 1700. No relief. Hx of Heart attack with     

      CABG. Chest pain, mid-sternal, constant, radiating to the back. Denies injury. Denies       

      cough. Pt took ISDN and Metoprolol at 1800. Coronavirus screen: Client denies travel        

      out of the [place] in the last 14 days. At this time, the client does not indicate any         

      symptoms associated with coronavirus-19. Ebola Screen: Patient negative for fever           

      greater than or equal to 101.5 degrees Fahrenheit, and additional compatible Ebola          

      Virus Disease symptoms Patient denies exposure to infectious person. Patient denies         

      travel to an Ebola-affected area in the 21 days before illness onset. No symptoms or        

      risks identified at this time. Initial Sepsis Screen: Does the patient meet any 2           

      criteria? Yes Does the patient have a suspected source of infection? No. Patient's          

      initial sepsis screen is negative. Risk Assessment: Do you want to hurt yourself or         

      someone else? Patient reports no desire to harm self or others. Onset of symptoms was       

      2020 at 15:00.                                                                  

19:44 Method Of Arrival: Wheelchair                                                           ca1 

19:44 Acuity: RICK 3                                                                           ca1 

                                                                                                  

Triage Assessment:                                                                                

10/04                                                                                             

01:09 General: Appears in no apparent distress. Behavior is calm, cooperative, appropriate    ll2 

      for age. Pain: Denies pain.                                                                 

                                                                                                  

Historical:                                                                                       

- Allergies:                                                                                      

10/03                                                                                             

20:03 NKA;                                                                                    ca1 

- Home Meds:                                                                                      

20:03 Novolog 100 unit/mL Sub-Q soln [Active]; Levemir 100 unit/mL subcutaneous soln          ca1 

      [Active]; aspirin 81 mg Oral TbEC [Active]; losartan 25 mg Oral tab [Active];               

      nitroglycerin 0.4 Oral 1 cap [Active]; citalopram 10 mg tab [Active];                       

- PMHx:                                                                                           

20:03 Depression; Diabetes - IDDM; Hyperlipidemia; Hypertension; stage 3 renal disease;       ca1 

- PSHx:                                                                                           

20:03 CABG;                                                                                   ca1 

                                                                                                  

- Immunization history:: Adult Immunizations up to date, Flu vaccine is not up to date.           

- Social history:: Smoking status: Patient denies any tobacco usage or history of.                

                                                                                                  

                                                                                                  

Screenin:38 Abuse screen: Denies threats or abuse. Nutritional screening: No deficits noted.        ea  

      Tuberculosis screening: No symptoms or risk factors identified. Fall Risk IV access (20     

      points).                                                                                    

                                                                                                  

Assessment:                                                                                       

21:43 Reassessment: Patient and/or family updated on plan of care and expected duration. Pain ea  

      level reassessed. Patient is alert, oriented x 3, equal unlabored respirations, skin        

      warm/dry/pink. Hospitalist at bedside updating pt on plan of care.                          

22:41 Reassessment: Patient and/or family updated on plan of care and expected duration. Pain ll2 

      level reassessed. Patient is alert, oriented x 3, equal unlabored respirations, skin        

      warm/dry/pink. Pain: Denies pain. Pain does not radiate. Pain began 2-3 days ago.           

      Cardiovascular: Capillary refill < 3 seconds Patient's skin is warm and dry.                

10/04                                                                                             

01:09 Reassessment: report given to ROSITA Haywood.                                               ll2 

                                                                                                  

Vital Signs:                                                                                      

10/03                                                                                             

19:44  / 48; Pulse 51; Resp 17 S; Temp 97.6(TE); Pulse Ox 99% on R/A; Weight 78.47 kg   ca1 

      (R); Height 5 ft. 7 in. (170.18 cm) (R); Pain 0/10;                                         

20:59  / 54; Pulse 50; Resp 18; Pulse Ox 99% ;                                          ll2 

21:55  / 61; Pulse 53; Resp 14; Pulse Ox 99% on R/A;                                    ll2 

23:00  / 55; Pulse 44; Resp 15; Pulse Ox 99% on R/A;                                    ll2 

23:00  / 94; Pulse 47; Resp 14; Pulse Ox 99% on R/A;                                    ll2 

10/04                                                                                             

00:00  / 94; Pulse 45; Resp 11; Pulse Ox 99% on R/A;                                    ll2 

10/03                                                                                             

19:44 Body Mass Index 27.10 (78.47 kg, 170.18 cm)                                             ca1 

                                                                                                  

ED Course:                                                                                        

10/03                                                                                             

19:28 Patient arrived in ED.                                                                  bp1 

19:48 Marshall Rose MD is Attending Physician.                                             7 

19:59 Triage completed.                                                                       ca1 

20:03 Arm band placed on right wrist.                                                         ca1 

20:15 Dori Pantoja, ROSITA is Primary Nurse.                                                  ll2 

20:39 Patient maintains SpO2 saturation greater than 95% on room air.                         ea  

20:40 Patient has correct armband on for positive identification. Bed in low position. Call   ea  

      light in reach. Side rails up X2. Cardiac monitor on. Pulse ox on. NIBP on.                 

21:12 Notified ED physician of a critical lab result(s). Trop 0.68.                           lp1 

21:26 XRAY Chest (1 view) In Process Unspecified.                                             EDMS

21:29 Justin Tanner MD is Hospitalizing Provider.                                         7 

21:42 Inserted saline lock: 22 gauge in right forearm, using aseptic technique.               ea  

10/04                                                                                             

01:09 No provider procedures requiring assistance completed. Patient admitted, IV remains in  ll2 

      place.                                                                                      

                                                                                                  

Administered Medications:                                                                         

10/03                                                                                             

21:41 Drug: Aspirin Chewable Tablet 243 mg {Note: pt took 81mg at home this AM.} Route: PO;   ea  

22:45 Follow up: Response: No adverse reaction                                                ll2 

21:42 Drug: Lasix 20 mg Route: IVP; Site: right forearm;                                      ea  

22:45 Follow up: Response: No adverse reaction                                                ll2 

22:45 Follow up: Response: No adverse reaction                                                ll2 

                                                                                                  

                                                                                                  

Outcome:                                                                                          

21:30 Decision to Hospitalize by Provider.                                                    [facility] 

10/04                                                                                             

01:09 Admitted to Med/surg accompanied by tech, family with patient, via wheelchair, Report   ll2 

      called to  Chastity Garcia RN                                                                 

      Condition: stable                                                                           

      Instructed on the need for admit.                                                           

01:11 Patient left the ED.                                                                    ll2 

                                                                                                  

Signatures:                                                                                       

Dispatcher [employer]                           EDMS                                                 

Manisha Valente RN                         RN   lp1                                                  

Paty Naranjo RN                      RN   Jen Vila RN RN ca1 [place], Lacie, ROSITA BECKER   ll2                                                  

Margarita Dixon                           bp1                                                  

Marshall Rose MD MD   [facility]                                                  

                                                                                                  

Corrections: (The following items were deleted from the chart)                                    

10/03                                                                                             

19:59 19:44 Chief complaint: Patient states: Chest pain started around 1500 today. Took Nitro ca1 

      SL x 2 around 7817-5925. No relief. Took ASA x 1 at 1700. No relief. Hx of Heart attack     

      with CABG. Chest pain, mid-sternal, constant, radiating to the back. Denies injury.         

      Denies cough. ca1                                                                           

                                                                                                  

**************************************************************************************************

## 2020-10-04 LAB
BUN BLD-MCNC: 40 MG/DL (ref 7–18)
GLUCOSE SERPLBLD-MCNC: 82 MG/DL (ref 74–106)
HCT VFR BLD CALC: 29 % (ref 36–45)
HDLC SERPL-MCNC: 36 MG/DL (ref 40–60)
LDLC SERPL CALC-MCNC: 75 MG/DL (ref ?–130)
LYMPHOCYTES # SPEC AUTO: 1.4 K/UL (ref 0.7–4.9)
MAGNESIUM SERPL-MCNC: 2.2 MG/DL (ref 1.8–2.4)
PMV BLD: 10.7 FL (ref 7.6–11.3)
POTASSIUM SERPL-SCNC: 3.9 MMOL/L (ref 3.5–5.1)
RBC # BLD: 3.04 M/UL (ref 3.86–4.86)
TSH SERPL DL<=0.05 MIU/L-ACNC: 2.51 UIU/ML (ref 0.36–3.74)

## 2020-10-04 RX ADMIN — ISOSORBIDE MONONITRATE SCH MG: 30 TABLET, EXTENDED RELEASE ORAL at 07:49

## 2020-10-04 RX ADMIN — HUMAN INSULIN SCH UNIT: 100 INJECTION, SOLUTION SUBCUTANEOUS at 16:53

## 2020-10-04 RX ADMIN — HUMAN INSULIN SCH: 100 INJECTION, SOLUTION SUBCUTANEOUS at 07:30

## 2020-10-04 RX ADMIN — Medication SCH ML: at 07:49

## 2020-10-04 RX ADMIN — INSULIN GLARGINE SCH UNITS: 100 INJECTION, SOLUTION SUBCUTANEOUS at 21:12

## 2020-10-04 RX ADMIN — SODIUM CHLORIDE SCH MLS: 0.45 INJECTION, SOLUTION INTRAVENOUS at 12:05

## 2020-10-04 RX ADMIN — Medication SCH ML: at 21:13

## 2020-10-04 RX ADMIN — HUMAN INSULIN SCH UNIT: 100 INJECTION, SOLUTION SUBCUTANEOUS at 21:12

## 2020-10-04 RX ADMIN — CITALOPRAM HYDROBROMIDE SCH MG: 10 TABLET ORAL at 21:11

## 2020-10-04 RX ADMIN — ATORVASTATIN CALCIUM SCH MG: 40 TABLET, FILM COATED ORAL at 21:11

## 2020-10-04 RX ADMIN — ASPIRIN SCH MG: 81 TABLET, COATED ORAL at 07:49

## 2020-10-04 RX ADMIN — HUMAN INSULIN SCH UNIT: 100 INJECTION, SOLUTION SUBCUTANEOUS at 12:04

## 2020-10-04 NOTE — P.PN
Subjective


Date of Service: 10/04/20


Primary Care Provider: Dr. Lopez


Chief Complaint: NSTEMI


Subjective: Improving (Patient is improving admitted with worsening chest pain 

now has non ST elevation MI)





Review of Systems


Unremarkable


General: Weakness





Physical Examination





- Vital Signs


Temperature: 97.1 F


Blood Pressure: 131/61


Pulse: 44


Respirations: 17


Pulse Ox (%): 100





- Physical Exam


General: Alert, Cooperative


Respiratory: Clear to auscultation bilaterally, Normal air movement


Cardiovascular: No edema, Normal S1 S2


Gastrointestinal: Normal bowel sounds, Soft and benign





- Studies


Laboratory Data (last 24 hrs)





10/03/20 20:27: PT 12.5, INR 1.06


10/03/20 20:27: WBC 4.8, Hgb 9.5 L, Hct 28.2 L, Plt Count 93 L


10/03/20 20:27: Sodium 139, Potassium 4.6, BUN 42 H, Creatinine 1.60 H, Glucose 

291 H, Magnesium 2.1, Total Bilirubin 0.5, AST 13 L, ALT 12, Alkaline 

Phosphatase 56








Assessment & Plan





- Problems (Diagnosis)


(1) Non-ST elevation MI (NSTEMI)


Current Visit: Yes   Status: Acute   


Plan: 


Patient is 84 years of age admitted with unstable angina non ST elevation MI 

currently stable on having any chest pain schedule for a cardiac catheterization

tomorrow the also diabetic abnormal renal function start her on some IV fluids 

patient is fully anti coagulated troponins have increased blood pressure vital 

signs stable EKG shows nonspecific changes as per the ER note





Discharge Plan: Home

## 2020-10-05 LAB
BUN BLD-MCNC: 34 MG/DL (ref 7–18)
GLUCOSE SERPLBLD-MCNC: 58 MG/DL (ref 74–106)
POTASSIUM SERPL-SCNC: 3.8 MMOL/L (ref 3.5–5.1)

## 2020-10-05 PROCEDURE — B2181ZZ FLUOROSCOPY OF LEFT INTERNAL MAMMARY BYPASS GRAFT USING LOW OSMOLAR CONTRAST: ICD-10-PCS

## 2020-10-05 PROCEDURE — 4A023N7 MEASUREMENT OF CARDIAC SAMPLING AND PRESSURE, LEFT HEART, PERCUTANEOUS APPROACH: ICD-10-PCS

## 2020-10-05 PROCEDURE — 027035Z DILATION OF CORONARY ARTERY, ONE ARTERY WITH TWO DRUG-ELUTING INTRALUMINAL DEVICES, PERCUTANEOUS APPROACH: ICD-10-PCS

## 2020-10-05 PROCEDURE — B2111ZZ FLUOROSCOPY OF MULTIPLE CORONARY ARTERIES USING LOW OSMOLAR CONTRAST: ICD-10-PCS

## 2020-10-05 RX ADMIN — Medication SCH ML: at 08:01

## 2020-10-05 RX ADMIN — HUMAN INSULIN SCH: 100 INJECTION, SOLUTION SUBCUTANEOUS at 07:30

## 2020-10-05 RX ADMIN — SODIUM CHLORIDE SCH MLS: 0.45 INJECTION, SOLUTION INTRAVENOUS at 01:37

## 2020-10-05 RX ADMIN — METOPROLOL TARTRATE SCH MG: 25 TABLET ORAL at 05:25

## 2020-10-05 RX ADMIN — Medication SCH ML: at 22:02

## 2020-10-05 RX ADMIN — ISOSORBIDE MONONITRATE SCH MG: 30 TABLET, EXTENDED RELEASE ORAL at 08:01

## 2020-10-05 RX ADMIN — INSULIN LISPRO SCH: 100 INJECTION, SOLUTION INTRAVENOUS; SUBCUTANEOUS at 17:00

## 2020-10-05 RX ADMIN — HUMAN INSULIN SCH: 100 INJECTION, SOLUTION SUBCUTANEOUS at 11:30

## 2020-10-05 RX ADMIN — SODIUM CHLORIDE SCH: 0.45 INJECTION, SOLUTION INTRAVENOUS at 12:40

## 2020-10-05 RX ADMIN — INSULIN GLARGINE SCH UNITS: 100 INJECTION, SOLUTION SUBCUTANEOUS at 22:01

## 2020-10-05 RX ADMIN — HUMAN INSULIN SCH: 100 INJECTION, SOLUTION SUBCUTANEOUS at 16:30

## 2020-10-05 RX ADMIN — ATORVASTATIN CALCIUM SCH MG: 40 TABLET, FILM COATED ORAL at 22:01

## 2020-10-05 RX ADMIN — ASPIRIN SCH MG: 81 TABLET, COATED ORAL at 05:25

## 2020-10-05 RX ADMIN — HUMAN INSULIN SCH UNIT: 100 INJECTION, SOLUTION SUBCUTANEOUS at 21:00

## 2020-10-05 RX ADMIN — CITALOPRAM HYDROBROMIDE SCH MG: 10 TABLET ORAL at 22:01

## 2020-10-05 RX ADMIN — INSULIN LISPRO SCH: 100 INJECTION, SOLUTION INTRAVENOUS; SUBCUTANEOUS at 12:00

## 2020-10-05 NOTE — CON
Date of Consultation:  10/04/2020



Reason For Consultation:  Chest pain.



History Of Present Illness:  Ms. Lancaster is an 84-year-old Latin-American woman.  She is known to me fro
m previous office visits and admissions.  She has a history of coronary artery disease, status post C
ABG.  Last evaluation with catheterization was in 2015.  She has a history of hypertension, diabetes,
 dyslipidemia, congestive heart failure, chronic renal disease, and depression.  She normally sees Dr John Reynaga as an outpatient.  She came in with substernal chest pressure at rest, radiated to the neck. 
 She had some nausea, shortness of breath, but no diaphoresis.  Denied PND, orthopnea, pedal edema, p
alpitations, or syncope.



Past Medical History:  As stated above.



Allergies:  NONE.



Review of Systems:

Negative.



Social History:  Negative.



Family History:  Positive for heart disease.



Medications:  At home include Imdur, losartan, aspirin, Lasix, insulin, Zocor, and metoprolol.



Physical Examination:

Vital Signs:  Stable.  She was afebrile. 

General:  She was in no acute distress.  She was in sinus rhythm. 

HEENT:  Negative. 

Neck:  Supple without any lymphadenopathy, JVD, thyromegaly, or bruit. 

Chest:  Clear to auscultation and percussion. 

Cardiac:  Regular rhythm and rate with an S4 gallops.  No murmurs or rubs. 

Abdomen:  Obese, but benign. 

Extremities:  No clubbing, cyanosis, or edema. 

Skin:  Dry and intact. 

Neurologic:  She was nonfocal.  Pulses were present distally bilaterally.



Diagnostic Data:  Creatinine of 1.6.  Hemoglobin was 9.5.  Glucose was 291.  Her troponin was 2.74.



Impression And Plan:  

1.Ms. Lancaster has had a non-ST-elevation myocardial infarction.  She has history of coronary artery dis
ease, status post coronary artery bypass graft.  She is pain-free now.  We will continue her present 
regimen including home medications plus Lovenox.  We will plan for a heart catheterization on 10/05/2
020.  She understands the risk and the benefits of the procedure.  She agreed to proceed.  Her daught
er was in the room when we discussed the plans.

2.Renal insufficiency.  Increased creatinine at 1.6.  We will give her Mucomyst before and after the
 procedure.

3.Hypertension.

4.Diabetes.

5.History of chronic diastolic congestive heart failure.

6.Dyslipidemia.

7.Depression.





NB/ERIK

DD:  10/05/2020 04:27:45Voice ID:  [id number]

DT:  10/05/2020 09:22:41Report ID:  [id number]

## 2020-10-06 VITALS — SYSTOLIC BLOOD PRESSURE: 142 MMHG | DIASTOLIC BLOOD PRESSURE: 61 MMHG | TEMPERATURE: 98 F

## 2020-10-06 VITALS — OXYGEN SATURATION: 97 %

## 2020-10-06 LAB
BUN BLD-MCNC: 27 MG/DL (ref 7–18)
GLUCOSE SERPLBLD-MCNC: 163 MG/DL (ref 74–106)
POTASSIUM SERPL-SCNC: 4.5 MMOL/L (ref 3.5–5.1)

## 2020-10-06 RX ADMIN — METOPROLOL TARTRATE SCH MG: 25 TABLET ORAL at 08:04

## 2020-10-06 RX ADMIN — HUMAN INSULIN SCH: 100 INJECTION, SOLUTION SUBCUTANEOUS at 16:30

## 2020-10-06 RX ADMIN — ASPIRIN SCH MG: 81 TABLET, COATED ORAL at 08:04

## 2020-10-06 RX ADMIN — INSULIN LISPRO SCH UNIT: 100 INJECTION, SOLUTION INTRAVENOUS; SUBCUTANEOUS at 16:36

## 2020-10-06 RX ADMIN — INSULIN LISPRO SCH UNIT: 100 INJECTION, SOLUTION INTRAVENOUS; SUBCUTANEOUS at 11:40

## 2020-10-06 RX ADMIN — HUMAN INSULIN SCH: 100 INJECTION, SOLUTION SUBCUTANEOUS at 07:30

## 2020-10-06 RX ADMIN — ISOSORBIDE MONONITRATE SCH MG: 30 TABLET, EXTENDED RELEASE ORAL at 08:04

## 2020-10-06 RX ADMIN — Medication SCH: at 08:05

## 2020-10-06 RX ADMIN — HUMAN INSULIN SCH UNIT: 100 INJECTION, SOLUTION SUBCUTANEOUS at 11:37

## 2020-10-07 NOTE — XMS REPORT
[facility]

                          Created on:2020



Patient:Cecilia Lancaster

Sex:Female

:1935

External Reference #:[id number]





Demographics







                          Address                   [address] [address]

 

                          Phone                     [phone]

 

                          Preferred Language        es

 

                          Marital Status            Unknown

 

                          Roman Catholic Affiliation     Unknown

 

                          Race                      Unknown

 

                          Ethnic Group              Unknown









Author







                          Organization              [employer]









Care Team Providers







                    Name                Role                Phone

 

                    Lopez, Na            Provider Role       Unavailable









Allergies, Adverse Reactions, Alerts







                    Substance           Reaction            Event Type

 

                    N.K.D.A.            Info Not Available  Non Drug Allergy







Problems







             Problem Type Condition    Code         Onset Dates  Condition Statu

s

 

             Problem      Hyperlipidemia, mixed E78.2                     Active

 

             Problem      Arteriosclerosis of coronary artery I25.10            

        Active

 

             Problem      Diverticulosis of colon K57.30                    Acti

ve

 

             Problem      Anemia in chronic illness D63.8                     Ac

tive

 

             Assessment   Stable angina I20.8                     Active

 

             Problem      Benign essential HTN I10                       Active

 

             Assessment   Long term current use of insulin Z79.4                

     Active

 

             Problem      Vitamin B12 deficiency E53.8                     Activ

e

 

             Assessment   Fatty liver  K76.0                     Active

 

             Problem      Diabetes     E11.9                     Active

 

             Problem      Osteoporosis M81.0                     Active

 

             Problem      GERD (gastroesophageal reflux K21.9                   

  Active



                          disease)                               

 

             Problem      CKD (chronic kidney disease) stage N18.3              

       Active



                          3, GFR 30-59 ml/min                           

 

             Problem      Posterior auricular lymphadenopathy R59.0             

        Active

 

             Problem      Urinary tract infection without N39.0                 

    Active



                          hematuria, site unspecified                           

 

             Problem      Thrombocytopenia D69.6                     Active

 

             Problem      Mixed hyperlipidemia E78.2                     Active

 

             Problem      Seasonal allergies J30.2                     Active

 

             Problem      Acute on chronic diastolic heart I50.33               

     Active



                          failure                                

 

             Problem      Stable angina I20.8                     Active

 

             Problem      Chronic kidney disease, stage 4 N18.4                 

    Active



                          (severe)                               

 

             Problem      Long term current use of insulin Z79.4                

     Active

 

             Problem      Depression with anxiety F41.8                     Acti

ve

 

             Assessment   Benign essential HTN I10                       Active

 

             Problem      Chronic fatigue R53.82                    Active

 

             Problem      Arthritis    M19.90                    Active

 

             Assessment   Anemia in chronic illness D63.8                     Ac

tive

 

             Problem      Malignant neoplasm of urinary C67.9                   

  Active



                          bladder, unspecified site                           

 

             Assessment   Thrombocytopenia D69.6                     Active

 

             Problem      Unsteady gait R26.81                    Active

 

             Assessment   Chronic kidney disease, stage 4 N18.4                 

    Active



                          (severe)                               

 

             Problem      Type 2 diabetes mellitus with E11.22                  

  Active



                          diabetic chronic kidney disease                       

    

 

             Assessment   Acute on chronic diastolic heart I50.33               

     Active



                          failure                                

 

             Problem      Leukocytopenia, unspecified D72.819                   

Active

 

             Problem      DJD (degenerative joint disease), M15.9               

      Active



                          multiple sites                           

 

             Problem      Type 2 diabetes mellitus without E11.9                

     Active



                          complications                           

 

             Problem      Trigger finger M65.30                    Active

 

             Problem      Primary osteoarthritis of both hips M16.0             

        Active

 

             Problem      Chronic renal disease N18.9                     Active

 

             Problem      DDD (degenerative disc disease), M51.37               

     Active



                          lumbosacral                            

 

             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            

        Active



                          conditions                             

 

             Assessment   Mixed hyperlipidemia E78.2                     Active

 

             Problem      Hyperglycemia R73.9                     Active

 

             Assessment   Type 2 diabetes mellitus with E11.22                  

  Active



                          diabetic chronic kidney disease                       

    

 

             Problem      Right upper quadrant abdominal pain R10.11            

        Active

 

             Problem      Decreased hearing, unspecified H91.90                 

   Active



                          laterality                             

 

             Problem      Fatty liver  K76.0                     Active







Medications







        Medication Code    Code    Instructions Start   End     Status  Dosage



                System                  Date    Date            

 

        Cetirizine HCl NDC     80010021812 10 MG                   Active  TOME 

CARLOS



                                                                TABLETA



                                                                TODOS LOS



                                                                PADRON

 

        BD Pen Needle NDC     0       31G X 8 MM                 Active  USE 1



        Short U/F                                                 NEEDLE WITH



                                                                PEN 3 TIMES

 

        Estradiol NDC     49256027867 0.1 MG/GM                 Active  as direc

paola



                                Vaginal Two                         



                                times a Week                         

 

        Ranitidine HCl NDC     58299132462 150 MG Orally May 11,         Active 

 1 tablet



                                Once a day                     

 

        Omeprazole NDC     08692069521 40 MG Orally                 Active  1 ca

psule



                                Once a day                         

 

        Zocor   NDC     68263680273 80 MG                   Active  TOME CARLOS



                                                                TABLETA



                                                                TODOS LOS



                                                                PADRON

 

        Cozaar  NDC     61005489558 25 MG Orally                 Active  1 table

t



                                Once a day                         

 

        Simvastatin NDC     77470375879 80 MG                   Active  TOME CARLOS



                                                                TABLETA



                                                                TODOS LOS



                                                                PADRON EN LA



                                                                NOCHE

 

        Isosorbide NDC     42276846039 30 MG                   Active  TOME CARLOS



        Dinitrate                                                 TABLETA



                                                                TODOS LOS



                                                                PADRON

 

        Isosorbide NDC     42158915428 30 MG Orally                 Active  1 ta

blet



        Dinitrate                 Once a day                         

 

        NovoLog Flexpen ND     01542658962 100 UNIT/ML                 Active  

as directed



                                Subcutaneous 15                         



                                units before                         



                                lunch and                         



                                dinner                          

 

        Promethazine HCl NDC     64121422346 25 MG Orally                 Active

  1 tablet as



                                every 6 hrs                         needed

 

        Macrobid NDC     55813217220 100 MG Orally ,         Active  1 ca

psule



                                every 12 hrs 2018                    with food

 

        Macrobid NDC     89492428722 100 MG Orally ,         Active  1 ca

psule



                                every 12 hrs                     with food

 

        Citalopram NDC     73024253433 10 MG                   Active  TOME CARLOS



        Hydrobromide                                                 TABLETA POR



                                                                VIA ORAL



                                                                ONCE A DAY

 

        Levemir ND     30037574909 100 UNIT/ML                 Active  as direc

paola



        FlexTouch                 Subcutaneous 50                         



                                units once a                         



                                day                             

 

        Furosemide NDC     85142031308 40 MG Orally                 Active  1 ta

blet



                                Once a day                         

 

        NovoFine Plus NDC     97860859135 32G X 4 MM SC                 Active  

as directed



                                twice daily                         

 

        Simvastatin NDC     92664859037 80 MG Orally                 Active  1 t

ablet in



                                Once a day                         the evening

 

        Famotidine ND     31203288335 40 MG Orally May 11,         Active  1 ta

blet as



                                Once a day 2020                    needed

 

        Trimethoprim NDC     28695309595 100 MG Orally                 Active  1

 tablet



                                Once a day                         

 

        Premarin NDC     37067157083 0.625 MG/GM                 Active  1 gm



                                Vaginal twice                         



                                weekly                          

 

        Aspir-81 NDC     59533610801 81 MG Orally                 Active  1 tabl

et



                                Once a day                         

 

        Flonase NDC     74298568647 50 MCG/ACT                 Active  1 spray i

n



                                Nasally Once a                         each



                                day                             nostril

 

        Toprol XL NDC     65957815479 25 MG                   Active  TAKE 1



                                                                TABLET BY



                                                                MOUTH DAILY

 

        Furosemide NDC     27658240261 40 MG Orally                 Active  1 ta

blet



                                Once a day                         

 

        Metoprolol NDC     83512744728 25 MG Orally                 Active  1 ta

blet



        Succinate ER                 Once a day                         

 

        Tradjenta NDC     26866493578 5 MG                    Active  TOME CARLOS



                                                                TABLETA



                                                                TODOS LOS



                                                                PADRON

 

        NovoFine Plus NDC     26832659778 32G X 4 MM SC                 Active  

as directed



                                twice daily                         

 

        Levemir NDC     97259472563 100 UNIT/ML                 Active  50 units



                                Subcutaneous                         



                                once daily                         







Results

No Known Results



Summary Purpose

[employer] Submission

## 2020-10-07 NOTE — XMS REPORT
[facility]

                          Created on:2020



Patient:Cecilia Lancaster

Sex:Female

:1935

External Reference #:[id number]





Demographics







                          Address                   [address] [address]

 

                          Phone                     [phone]

 

                          Preferred Language        es

 

                          Marital Status            Unknown

 

                          Synagogue Affiliation     Unknown

 

                          Race                      Unknown

 

                          Ethnic Group              Unknown









Author







                          Organization              [employer]









Care Team Providers







                    Name                Role                Phone

 

                    Lopez, Na            Provider Role       Unavailable









Allergies, Adverse Reactions, Alerts







                    Substance           Reaction            Event Type

 

                    N.K.D.A.            Info Not Available  Non Drug Allergy







Problems







             Problem Type Condition    Code         Onset Dates  Condition Statu

s

 

             Problem      Hyperlipidemia, mixed E78.2                     Active

 

             Problem      Arteriosclerosis of coronary artery I25.10            

        Active

 

             Problem      Diverticulosis of colon K57.30                    Acti

ve

 

             Problem      Anemia in chronic illness D63.8                     Ac

tive

 

             Problem      Benign essential HTN I10                       Active

 

             Problem      Vitamin B12 deficiency E53.8                     Activ

e

 

             Problem      Diabetes     E11.9                     Active

 

             Problem      Osteoporosis M81.0                     Active

 

             Problem      GERD (gastroesophageal reflux K21.9                   

  Active



                          disease)                               

 

             Problem      CKD (chronic kidney disease) stage N18.3              

       Active



                          3, GFR 30-59 ml/min                           

 

             Problem      Posterior auricular lymphadenopathy R59.0             

        Active

 

             Problem      Urinary tract infection without N39.0                 

    Active



                          hematuria, site unspecified                           

 

             Problem      Thrombocytopenia D69.6                     Active

 

             Problem      Mixed hyperlipidemia E78.2                     Active

 

             Problem      Seasonal allergies J30.2                     Active

 

             Problem      Acute on chronic diastolic heart I50.33               

     Active



                          failure                                

 

             Problem      Stable angina I20.8                     Active

 

             Problem      Chronic kidney disease, stage 4 N18.4                 

    Active



                          (severe)                               

 

             Problem      Long term current use of insulin Z79.4                

     Active

 

             Problem      Depression with anxiety F41.8                     Acti

ve

 

             Assessment   Benign essential HTN I10                       Active

 

             Problem      Chronic fatigue R53.82                    Active

 

             Problem      Arthritis    M19.90                    Active

 

             Assessment   Stable angina I20.8                     Active

 

             Problem      Malignant neoplasm of urinary C67.9                   

  Active



                          bladder, unspecified site                           

 

             Problem      Unsteady gait R26.81                    Active

 

             Problem      Type 2 diabetes mellitus with E11.22                  

  Active



                          diabetic chronic kidney disease                       

    

 

             Problem      Leukocytopenia, unspecified D72.819                   

Active

 

             Problem      DJD (degenerative joint disease), M15.9               

      Active



                          multiple sites                           

 

             Problem      Type 2 diabetes mellitus without E11.9                

     Active



                          complications                           

 

             Problem      Trigger finger M65.30                    Active

 

             Problem      Primary osteoarthritis of both hips M16.0             

        Active

 

             Problem      Chronic renal disease N18.9                     Active

 

             Problem      DDD (degenerative disc disease), M51.37               

     Active



                          lumbosacral                            

 

             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            

        Active



                          conditions                             

 

             Assessment   Chronic fatigue R53.82                    Active

 

             Problem      Hyperglycemia R73.9                     Active

 

             Assessment   Urinary tract infection, site not N39.0               

      Active



                          specified                              

 

             Problem      Right upper quadrant abdominal pain R10.11            

        Active

 

             Problem      Decreased hearing, unspecified H91.90                 

   Active



                          laterality                             

 

             Problem      Fatty liver  K76.0                     Active







Medications







        Medication Code    Code    Instructions Start   End     Status  Dosage



                System                  Date    Date            

 

        Famotidine ND     [id number] 40 MG Orally May 11,         Active  1 ta

blet as



                                Once a day                     needed

 

        Estradiol NDC     80890649633 0.1 MG/GM                 Active  as direc

paola



                                Vaginal Two                         



                                times a Week                         

 

        Isosorbide NDC     [id number] 30 MG                   Active  TOME CARLOS



        Dinitrate                                                 TABLETA



                                                                



                                                                PADRON

 

        Toprol XL NDC     [id number] 25 MG                   Active  TAKE 1



                                                                TABLET BY



                                                                MOUTH DAILY

 

        NovoFine Plus NDC     [id number] 32G X 4 MM SC                 Active  

as directed



                                twice daily                         

 

        Cozaar  NDC     55035501513 25 MG Orally                 Active  1 table

t



                                Once a day                         

 

        Metoprolol NDC     75568558819 25 MG Orally                 Active  1 ta

blet



        Succinate ER                 Once a day                         

 

        Furosemide NDC     58345108599 40 MG Orally                 Active  1 ta

blet



                                Once a day                         

 

        Ranitidine HCl NDC     61839652986 150 MG Orally May 11,         Active 

 1 tablet



                                Once a day                     

 

        Cetirizine HCl NDC     [id number] 10 MG                   Active  TOME 

CARLOS



                                                                TABLETA



                                                                



                                                                PADRON

 

        Omeprazole NDC     69441038352 40 MG Orally                 Active  1 ca

psule



                                Once a day                         

 

        Promethazine HCl NDC     12393950449 25 MG Orally                 Active

  1 tablet as



                                every 6 hrs                         needed

 

        Macrobid NDC     17159471009 100 MG Orally ,         Active  1 ca

psule



                                every 12 hrs                     with food

 

        Zocor   NDC     58913647129 80 MG                   Active  TOME CARLOS



                                                                TABLETA



                                                                



                                                                PADRON

 

        Trimethoprim NDC     53355540773 100 MG Orally                 Active  1

 tablet



                                Once a day                         

 

        Tradjenta NDC     55050925243 5 MG                    Active  TOME CARLOS



                                                                TABLETA



                                                                



                                                                PADRON

 

        NovoFine Plus NDC     [id number] 32G X 4 MM SC                 Active  

as directed



                                twice daily                         

 

        Macrobid NDC     47782208467 100 MG Orally ,         Active  1 ca

psule



                                every 12 hrs                     with food

 

        Furosemide NDC     [id number] 40 MG Orally                 Active  1 ta

blet



                                Once a day                         

 

        Isosorbide NDC     [id number] 30 MG                   Active  1 tablet



        Dinitrate                                                 Orally Once



                                                                a day

 

        Flonase NDC     50048117802 50 MCG/ACT                 Active  1 spray i

n



                                Nasally Once a                         each



                                day                             nostril

 

        Levemir NDC     14266156568 100 UNIT/ML                 Active  50 units



                                Subcutaneous                         



                                once daily                         

 

        Citalopram NDC     58630653455 10 MG                   Active  TOME CARLOS



        Hydrobromide                                                 TABLETA POR



                                                                VIA ORAL



                                                                ONCE A DAY

 

        Simvastatin NDC     02611113202 80 MG                   Active  TOME CARLOS



                                                                TABLETA



                                                                TODOS LOS



                                                                PADRON EN LA



                                                                NOCHE

 

        Premarin NDC     77756917899 0.625 MG/GM                 Active  1 gm



                                Vaginal twice                         



                                weekly                          

 

        Levemir NDC     [id number] 100 UNIT/ML                 Active  as direc

paola



        FlexTouch                 Subcutaneous 50                         



                                units once a                         



                                day                             

 

        BD Pen Needle NDC     0       31G X 8 MM                 Active  USE 1



        Short U/F                                                 NEEDLE WITH



                                                                PEN 3 TIMES

 

        NovoLog Flexpen NDC     78911597352 100 UNIT/ML                 Active  

as directed



                                Subcutaneous 15                         



                                units before                         



                                lunch and                         



                                dinner                          

 

        Aspir-81 NDC     62277837496 81 MG Orally                 Active  1 tabl

et



                                Once a day                         







Results

No Known Results



Summary Purpose

eClinicalWorks Submission

## 2020-10-07 NOTE — P.PN
Subjective


Date of Service: 10/05/20





  Patient is clinically doing well.  Scheduled for heart catheterization in the 

morning.  Denies any chest pain at this time.





Physical Examination





- Vital Signs


Temperature: 98.0 F


Blood Pressure: 142/61


Pulse: 59


Respirations: 16


Pulse Ox (%): 97





Assessment & Plan





- Advance Directives


Does patient have a Living Will: Yes


Does patient have a Durable POA for Healthcare: Yes

## 2020-10-07 NOTE — XMS REPORT
[facility]

                          Created on:2020



Patient:Cecilia Lancaster

Sex:Female

:1935

External Reference #:[id number]





Demographics







                          Address                   [address] [address]

 

                          Phone                     [phone]

 

                          Preferred Language        es

 

                          Marital Status            Unknown

 

                          Tenriism Affiliation     Unknown

 

                          Race                      Unknown

 

                          Ethnic Group              Unknown









Author







                          Organization              [employer]Works









Care Team Providers







                    Name                Role                Phone

 

                    Lopez, Na            Provider Role       Unavailable









Allergies

No Known Allergies



Problems







             Problem Type Condition    Code         Onset Dates  Condition Statu

s

 

             Problem      Hyperlipidemia, mixed E78.2                     Active

 

             Problem      Arteriosclerosis of coronary artery I25.10            

        Active

 

             Problem      Diverticulosis of colon K57.30                    Acti

ve

 

             Problem      Anemia in chronic illness D63.8                     Ac

tive

 

             Problem      Benign essential HTN I10                       Active

 

             Problem      Vitamin B12 deficiency E53.8                     Activ

e

 

             Problem      Diabetes     E11.9                     Active

 

             Problem      Osteoporosis M81.0                     Active

 

             Problem      GERD (gastroesophageal reflux K21.9                   

  Active



                          disease)                               

 

             Problem      CKD (chronic kidney disease) stage N18.3              

       Active



                          3, GFR 30-59 ml/min                           

 

             Problem      Posterior auricular lymphadenopathy R59.0             

        Active

 

             Problem      Urinary tract infection without N39.0                 

    Active



                          hematuria, site unspecified                           

 

             Problem      Thrombocytopenia D69.6                     Active

 

             Problem      Mixed hyperlipidemia E78.2                     Active

 

             Problem      Seasonal allergies J30.2                     Active

 

             Problem      Acute on chronic diastolic heart I50.33               

     Active



                          failure                                

 

             Problem      Stable angina I20.8                     Active

 

             Problem      Chronic kidney disease, stage 4 N18.4                 

    Active



                          (severe)                               

 

             Problem      Long term current use of insulin Z79.4                

     Active

 

             Problem      Depression with anxiety F41.8                     Acti

ve

 

             Problem      Chronic fatigue R53.82                    Active

 

             Problem      Arthritis    M19.90                    Active

 

             Problem      Malignant neoplasm of urinary C67.9                   

  Active



                          bladder, unspecified site                           

 

             Problem      Unsteady gait R26.81                    Active

 

             Problem      Type 2 diabetes mellitus with E11.22                  

  Active



                          diabetic chronic kidney disease                       

    

 

             Problem      Leukocytopenia, unspecified D72.819                   

Active

 

             Problem      DJD (degenerative joint disease), M15.9               

      Active



                          multiple sites                           

 

             Problem      Type 2 diabetes mellitus without E11.9                

     Active



                          complications                           

 

             Problem      Trigger finger M65.30                    Active

 

             Problem      Primary osteoarthritis of both hips M16.0             

        Active

 

             Problem      Chronic renal disease N18.9                     Active

 

             Problem      DDD (degenerative disc disease), M51.37               

     Active



                          lumbosacral                            

 

             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            

        Active



                          conditions                             

 

             Problem      Hyperglycemia R73.9                     Active

 

             Problem      Right upper quadrant abdominal pain R10.11            

        Active

 

             Problem      Decreased hearing, unspecified H91.90                 

   Active



                          laterality                             

 

             Problem      Fatty liver  K76.0                     Active







Medications







        Medication Code System Code    Instructions Start   End Date Status  Dos

age



                                        Date                    

 

        Cefdinir NDC     [id number] 300 MG Orally , , Active  1 c

apsule



                                every 12 hours 2020            







Results

No Known Results



Summary Purpose

eClinicalWorks Submission

## 2020-10-07 NOTE — ECHO
HEIGHT: 5 ft 7 in   WEIGHT: 173 lb 0 oz   DATE OF STUDY: 10/06/2020   REFER DR: Osbaldo Wood NP

2-DIMENSIONAL: YES

     M.MODE: YES

 DOPPLER: YES

COLOR FLOW: YES



                    TDS:  NO

PORTABLE: NO

 DEFINITY:  NO

BUBBLE STUDY: NO





DIAGNOSIS:  NSTEMI



CARDIAC HISTORY:  

CATHERIZATION: NO

SURGERY: YES

PROSTHETIC VALVE: NO

PACEMAKER: NO





MEASUREMENTS (cm)

    DIASTOLIC (NORMALS)                 SYSTOLIC (NORMALS)

IVSd                 1.0 (0.6-1.2)                    LA Diam 4.3 (1.9-4.0)     LVEF       
  57%  

LVIDd               4.1 (3.5-5.7)                        LVIDs      2.9 (2.0-3.5)     %FS  
        30%

LVPWd             1.0 (0.6-1.2)

Ao Diam           2.8 (2.0-3.7)



2 DIMENSIONAL ASSESSMENT:

RIGHT ATRIUM:                   NORMAL

LEFT ATRIUM:       DILATED



RIGHT VENTRICLE:            NORMAL

LEFT VENTRICLE: NORMAL



TRICUSPID VALVE:             NORMAL

MITRAL VALVE:    MITRAL ANNULAR CALCIFICATION



PULMONIC VALVE:             NORMAL

AORTIC VALVE:     SCLEROSIS



PERICARDIAL EFFUSION: NONE

AORTIC ROOT:      NORMAL





LEFT VENTRICULAR WALL MOTION:     NORMAL



DOPPLER/COLOR FLOW:     MILD AORTIC AND TRICUSPID REGURGITATION.



COMMENTS:      MILD AORTIC AND TRICUSPID REGURGITATION. NORMAL LEFT VENTRICULAR SIZE AND 
FUNCTION. LEFT ATRIAL ENLARGEMENT. MITRAL ANNULAR CALCIFICATION. AORTIC SCLEROSIS WITH NO 
STENOSIS.



TECHNOLOGIST:   FIORELLA DEY

## 2020-10-07 NOTE — XMS REPORT
Continuity of Care Document

                           Created on:2020



Patient:KELLY SCRUGGS

Sex:Female

:1935

External Reference #:[id number]





Demographics







                          Address                   [address] [place] [address]

 

                          Home Phone                [phone]

 

                          Mobile Phone              [phone]

 

                          Email Address             [email or web address]

 

                          Preferred Language        spa

 

                          Marital Status            Unknown

 

                          Hinduism Affiliation     Unknown

 

                          Race                      Unknown

 

                          Additional Race(s)        Unavailable

 

                          Ethnic Group              Unknown









Author







                          Organization              [facility]

t

 

                          Address                   [address]



                                                    [place] [address]

 

                          Phone                     [phone]









Support







                Name            Relationship    Address         Phone

 

                Tonny            Unavailable     [address] [phone]



                                                [place] [address] 

 

                rhea            Unavailable     Unavailable     [phone]









Care Team Providers







                    Name                Role                Phone

 

                    Unavailable         Unavailable         Unavailable









Problems

This patient has no known problems.



Allergies, Adverse Reactions, Alerts

This patient has no known allergies or adverse reactions.



Medications







       Ordered Filled Start  Stop   Current Ordering Indication Dosage Frequency

 Signature

                    Comments            Components          Source



     Medication Medication Date Date Medication? Clinician                (SIG) 

          



     Name Name                                                   

 

     Famotidine Famotidine       Yes  Na Lopez                1 tablet     

      [facility]



               5-11                               as needed           [facility]



               [facility]                                              [facility]



               00                                                l



                                                                 Outpati



                                                                 ent



                                                                 [facility]

 

     Ranitidine Ranitidine       Yes  Na Lopez                1 tablet     

      [facility]



     HCl  HCl  5-11                                              [facility]



               [facility]                                              [facility]



               00                                                l



                                                                 Outpati



                                                                 ent



                                                                 [facility]

 

     Macrobid Macrobid       Yes  Na Lopez                1 capsule        

   [facility]



               9-24                               with food           [facility]



               [facility]                                              [facility]



               00                                                l



                                                                 Outpati



                                                                 ent



                                                                 [facility]

 

     Estradiol Estradiol           Yes  Na Lopez                as             [facility]

[facility]



                                                  [facility]           [facility]



                                                                 l



                                                                 [facility]pat[facility]



                                                                 ent



                                                                 [facility]

 

     Isosorbide Isosorbide           Yes  Na Lopez                TOME CARLOS       

    [facility]



     Dinitrate Dinitrate                                    TABLETA           [facility]

[facility] -



                                                  TODOS LOS           [facility]



                                                  PADRON           l



                                                                 [facility]



                                                                 ent



                                                                 [facility]

 

     Toprol XL Toprol XL           Yes  Na Lopez                TAKE 1           

[facility]



                                                  TABLET BY           [facility]



                                                  MOUTH           [facility]



                                                  DAILY           l



                                                                 [facility]



                                                                 ent



                                                                 [facility]

 

     NovoFine NovoFine           Yes  Na Lopez                as             [facility]



     Plus Plus                                    directed           [facility]



                                                                 [facility]



                                                                 l



                                                                 [facility]



                                                                 ent



                                                                 [facility]

 

     Cozaar Cozaar           Yes  Na Lopez                1 tablet           [facility]



                                                                 [facility]



                                                                 [facility]



                                                                 l



                                                                 [facility]

 

     Metoprolol Metoprolol           Yes  Na Lopze                1 tablet       

    [facility]



     Succinate Succinate                                                   [facility]

 -



     ER   ER                                                     [facility]



                                                                 l



                                                                 Outpati



                                                                 ent



                                                                 Clinics

 

     Furosemide Furosemide           Yes  Na Lopze                1 tablet       

    [facility]



                                                                 [facility]



                                                                 [facility]



                                                                 l



                                                                 Outpati



                                                                 ent



                                                                 Clinics

 

     Cetirizine Cetirizine           Yes  Na Lopez                TOME CARLOS       

    [facility]



     HCl  HCl                                     TABLETA           [facility]



                                                  TODOS LOS           [facility]



                                                  PADRON           l



                                                                 Outpati



                                                                 ent



                                                                 Clinics

 

     Omeprazole Omeprazole           Yes  Na Lopez                1 capsule      

     [facility]



                                                                 [facility] -



                                                                 [facility]



                                                                 l



                                                                 Outpati



                                                                 ent



                                                                 Clinics

 

     Promethazin Promethazin           Yes  Na Lopez                1 tablet     

      [facility]



     e HCl e HCl                                    as needed           kes -



                                                                 [facility]



                                                                 l



                                                                 Outpati



                                                                 ent



                                                                 Clinics

 

     Zocor Zocor           Yes  Na Lopez                TOME CARLOS           [facility]



                                                  TABLETA           [facility]



                                                  TODOS LOS           [facility]



                                                  PADRON           l



                                                                 Outpati



                                                                 ent



                                                                 Clinics

 

     Trimethopri Trimethopri           Yes  Na Lopez                1 tablet     

      [facility]



     m    m                                                      [facility]



                                                                 l



                                                                 Outpati



                                                                 ent



                                                                 Clinics

 

     Tradjenta Tradjenta           Yes  Na Lopez                TOME CARLOS         

  [facility]



                                                  TABLETA           [facility]



                                                  TODOS LOS           [facility]



                                                  PADRON           l



                                                                 Outpati



                                                                 ent



                                                                 Clinics

 

     NovoFine NovoFine           Yes  Na Lopez                as             [facility]



     Plus Plus                                    directed           kes -



                                                                 [facility]



                                                                 l



                                                                 Outpati



                                                                 ent



                                                                 Clinics

 

     Furosemide Furosemide           Yes  Na Lopez                1 tablet       

    [facility]



                                                                 [facility] -



                                                                 [facility]



                                                                 l



                                                                 Outpati



                                                                 ent



                                                                 Clinics

 

     Isosorbide Isosorbide           Yes  Na Lopez                1 tablet       

    [facility]



     Dinitrate Dinitrate                                                   [facility]

 -



                                                                 [facility]



                                                                 l



                                                                 Outpati



                                                                 ent



                                                                 Clinics

 

     Flonase Flonase           Yes  Na Lopez                1 spray in           

[facility]



                                                  each           [facility] -



                                                  nostril           [facility]



                                                                 l



                                                                 Outpati



                                                                 ent



                                                                 Clinics

 

     Levemir Levemir           Yes  Na Lopez                50 units           [facility]

[facility]



                                                                 [facility] -



                                                                 [facility]



                                                                 l



                                                                 Outpati



                                                                 ent



                                                                 Clinics

 

     Citalopram Citalopram           Yes  Na Lopez                TOME CARLOS       

    [facility]



     Hydrobromid Hydrobromid                                    TABLETA         

  [facility]



     e    e                                       POR VIA           [facility]



                                                  ORAL ONCE           l



                                                  A DAY           Outpati



                                                                 ent



                                                                 Clinics

 

     Simvastatin Simvastatin           Yes  Na Lopez                TOME CARLOS     

      [facility]



                                                  TABLETA           [facility]



                                                  TODOS LOS           [facility]



                                                  PADRON [place] LA           l



                                                  NOCHE           Outpati



                                                                 ent



                                                                 Clinics

 

     Premarin Premarin           Yes  Na Lopez                1 gm           [facility]



                                                                 [facility]



                                                                 [facility]



                                                                 l



                                                                 Outpati



                                                                 ent



                                                                 Clinics

 

     BD Pen BD Pen           Yes  Na Lopez                USE 1           [facility]



     Needle Needle                                    NEEDLE           [facility]



     Short U/F Short U/F                                    WITH PEN 3          

 [facility]



                                                  TIMES           l



                                                                 Outpati



                                                                 ent



                                                                 Clinics

 

     NovoLog NovoLog           Yes  Na Lopez                as             [facility]



     Flexpen Flexpen                                    directed           [facility]

 -



                                                                 [facility]



                                                                 l



                                                                 Outpati



                                                                 ent



                                                                 Clinics

 

     Aspir-81 Aspir-81           Yes  Na Lopez                1 tablet           

[facility]



                                                                 [facility]



                                                                 [facility]



                                                                 l



                                                                 Outpati



                                                                 ent



                                                                 Clinics

 

     Simvastatin Simvastatin           Yes  Na Lopez                1 tablet     

      [facility]



                                                  in the           [facility] -



                                                  evening           [facility]



                                                                 l



                                                                 Outpati



                                                                 ent



                                                                 Clinics







Immunizations







           Ordered    Filled Immunization Date       Status     Comments   Sourc

e



           Immunization Name Name                                        

 

           FluAD      FluAD      2019-12-10 Completed             [facility]



                                 00:00:00                         [facility]







Procedures

This patient has no known procedures.



Encounters







        Start   End     Encounter Admission Attending Care    Care    Encounter 

Source



        Date/Time Date/Time Type    Type    Clinicians Facility Department ID   

   

 

        [id number]

 [facility]



        00:00:00 00:00:00                                                 [facility]



                                                                        ent



                                                                        Clinics

 

        2020 Outpatient                 [facility]

[id number] [facility]



        09:40:00 09:40:00                         t [employer]   [facility]                 Outpati



                                                                        ent



                                                                        Clinics

 

        2020 Outpatient                 [facility]

[id number] [facility]



        09:40:00 09:40:00                         t [employer]   [facility]



                                                Medicine                 Outpati



                                                                        ent



                                                                        Clinics

 

        2020 Outpatient                 [facility]

[id number] [facility]



        14:40:00 14:40:00                         t [employer]   [facility]



                                                Medicine                 Outpati



                                                                        ent



                                                                        Clinics

 

        2020 Outpatient                 [facility]

[id number] [facility]



        11:00:00 11:00:00                         t [employer]   [facility]



                                                Medicine                 Outpati



                                                                        ent



                                                                        Clinics

 

        2020 Outpatient                 [facility]

[id number] [facility]



        14:00:00 14:00:00                         t       Specialty/U         [facility]

[facility] -



                                                Specialty rology          [facility]

a



                                                /[facility]          l



                                                Clinic                  Outpati



                                                                        ent



                                                                        Clinics

 

        2020 Outpatient                 [facility]

[id number] [facility]



        08:53:00 08:53:00                         t [facility] payever    [facility]



                                                Medicine                 Outpati



                                                                        ent



                                                                        Clinics

 

        2020 Outpatient                 [facility]

[id number] [facility]



        09:31:00 09:31:00                         t [employer]   [facility]  Medicine         l



                                                Medicine                 Outpati



                                                                        ent



                                                                        Clinics

 

        2020 Outpatient                 [facility]

[id number] [facility]



        09:20:00 09:20:00                         t [employer]   [facility]



                                                Medicine                 Outpati



                                                                        ent



                                                                        Clinics

 

        2020 Outpatient                 [facility]

[id number] [facility]



        08:40:00 08:40:00                         t [employer]   [facility]



                                                Medicine                 Outpati



                                                                        ent



                                                                        Clinics

 

        2020 Outpatient                 [facility]

[id number] [facility]



        16:55:00 16:55:00                         t [employer]   [facility]



                                                Medicine                 Outpati



                                                                        ent



                                                                        Clinics

 

        2020 Outpatient                 [facility]

[id number] [facility]



        11:00:00 11:00:00                         t       Specialty/U         [facility]

kes -



                                                Specialty rology          [facility]

a



                                                /[facility]rology Clinic          l



                                                Clinic                  Outpati



                                                                        ent



                                                                        Clinics

 

        2019 Outpatient                 [facility]

[id number] [facility]



        15:51:00 15:51:00                         t [employer]   [facility]



                                                Medicine                 Outpati



                                                                        ent



                                                                        Clinics

 

        2019-12-10 2019-12-10 Outpatient                 [facility]

[id number] [facility]



        09:40:00 09:40:00                         t [employer]   [facility]



                                                Medicine                 Outpati



                                                                        ent



                                                                        Clinics

 

        2019 Outpatient                 [facility]

[id number] [facility]



        16:28:00 16:28:00                         t [employer]   [facility]



                                                Medicine                 Outpati



                                                                        ent



                                                                        Clinics

 

        2019 Outpatient                 [facility]

[id number] [facility]



        09:52:00 09:52:00                         t       Specialty/U         [facility]

kes -



                                                Specialty rology          [facility]

a



                                                [facility]          l



                                                Clinic                  Outpati



                                                                        ent



                                                                        Clinics

 

        2019 Outpatient                 [facility]

[id number] [facility]



        14:46:00 14:46:00                         t       Specialty/U         [facility]

kes -



                                                Specialty rology          [facility]

a



                                                [facility]          l



                                                Clinic                  Outpati



                                                                        ent



                                                                        Clinics

 

        2019 Outpatient                 [facility]

[id number] [facility]



        10:25:00 10:25:00                         t [facility] [facility]         L

[facility]



                                                [facility]                  l



                                                                        Outpati



                                                                        ent



                                                                        Clinics

 

        2019 Outpatient                 [facility]

[id number] [facility]



        09:15:00 09:15:00                         t       Specialty/U         [facility]

kes -



                                                Specialty rology          [facility]

a



                                                [facility] Clinic          l



                                                Clinic                  [facility]



                                                                        [facility]



                                                                        Clinics

 

        2019-10-03 2019-10-03 Outpatient                 [facility]

[id number] [facility]



        09:15:00 09:15:00                         t       Specialty/U         [facility]

kes -



                                                Specialty rology          [facility]

a



                                                [facility] Clinic          l



                                                Clinic                  [facility]



                                                                        [facility]



                                                                        Clinics

 

        2019-09-10 2019-09-10 Outpatient                 [facility]

[id number] [facility]



        08:40:00 08:40:00                         t [employer]   [facility]                 [facility]



                                                                        ent



                                                                        Clinics

 

        2019 Outpatient                 [facility]

[id number] [facility]



        13:44:00 13:44:00                         t       Specialty/U         [facility]

kes -



                                                Specialty rology          [facility]

a



                                                /[facility]rology Clinic          l



                                                Clinic                  Outpati



                                                                        ent



                                                                        Clinics

 

        2019-07-15 2019-07-15 Outpatient                 [facility]

[id number] [facility]



        13:45:00 13:45:00                         t       Specialty/U         [facility]

kes -



                                                Specialty rology          [facility]

a



                                                [facility] Clinic          l



                                                Clinic                  [facility]



                                                                        ent



                                                                        Clinics

 

        2019 Outpatient                 [facility]

[id number] [facility]



        10:15:00 10:15:00                         t       Specialty/U         [facility]

kes -



                                                Specialty rology          [facility]

a



                                                /[facility] Clinic          l



                                                Clinic                  Outpati



                                                                        ent



                                                                        Clinics

 

        2019 Outpatient                 [facility]

[id number] [facility]



        15:07:00 15:07:00                         t [employer]   [facility]                 [facility]



                                                                        ent



                                                                        Clinics

 

        2019 Outpatient                 [facility]

[id number] [facility]



        11:20:00 11:20:00                         t [employer]   [facility]                 [facility]



                                                                        ent



                                                                        Clinics

 

        2019-05-15 2019-05-15 Outpatient                 [facility]

[id number] [facility]



        11:38:00 11:38:00                         t [employer]   [employer]

s -



                                                Drive   [facility]



                                                Medicine                 Outpati



                                                                        ent



                                                                        Clinics

 

        2019 Outpatient                 [facility]

[id number] [facility]



        11:00:00 11:00:00                         t       Specialty/U         [facility]

kes -



                                                Specialty rology          [facility]

a



                                                /[facility] Clinic          l



                                                Clinic                  Outpati



                                                                        ent



                                                                        Clinics

 

        2019 Outpatient                 [facility]

[id number] [facility]



        10:28:00 10:28:00                         t       Specialty/U         [facility]

kes -



                                                Specialty rology          [facility]

a



                                                /[facility] Clinic          l



                                                Clinic                  Outpati



                                                                        ent



                                                                        Clinics

 

        2019 Outpatient                 [facility]

[id number] [facility]



        13:50:00 13:50:00                         t       Specialty/U         [facility]

kes -



                                                Specialty rology          [facility]

a



                                                /[facility] Clinic          l



                                                Clinic                  Outpati



                                                                        ent



                                                                        Clinics

 

        2019 Outpatient                 [facility]

[id number] [facility]



        09:00:00 09:00:00                         t       Specialty/U         [facility]

kes -



                                                Specialty rology          [facility]

a



                                                /[facility]rology Clinic          l



                                                Clinic                  Outpati



                                                                        ent



                                                                        Clinics

 

        2019 Outpatient                 [facility]

[id number] [facility]



        18:12:00 18:12:00                         t [employer]

s -



                                                [employer]   [facility]                 Outpati



                                                                        ent



                                                                        Clinics

 

        2019 Outpatient                 [facility]

[id number] [facility]



        09:45:00 09:45:00                         t [employer]   [employer]

s -



                                                Drive   [facility]                 Outpati



                                                                        ent



                                                                        Clinics

 

        2019 Outpatient                 [facility]

[id number] [facility]



        09:09:00 09:09:00                         t       Specialty/U         [facility]

kes -



                                                Specialty rology          [facility]

a



                                                /[facility] Clinic          l



                                                Clinic                  Outpati



                                                                        ent



                                                                        Clinics

 

        2019 Outpatient                 [facility]

[id number] [facility]



        10:22:00 10:22:00                         t [employer]

s -



                                                Drive   [facility]                 Outpati



                                                                        ent



                                                                        Clinics

 

        2019 Outpatient                 [facility]

[id number] [facility]



        11:03:00 11:03:00                         t [employer]   [address]         [facility]

s -



                                                Drive   [facility]  Medicine         l



                                                Medicine                 Outpati



                                                                        ent



                                                                        Clinics

 

        2019 Outpatient                 [facility]

[id number] [facility]



        11:30:00 11:30:00                         t       Specialty/U         [facility]

kes -



                                                Specialty rology          [facility]

a



                                                /Urology Clinic          l



                                                Clinic                  Outpati



                                                                        ent



                                                                        Clinics

 

        2019 Outpatient                 [facility]

[id number] [facility]



        09:20:00 09:20:00                         t [employer]   [address]         [facility]

s -



                                                Drive   [facility]  Medicine         l



                                                Medicine                 Outpati



                                                                        ent



                                                                        Clinics

 

        2019 Outpatient                 [facility]

[id number] [facility]



        09:30:00 09:30:00                         t [employer]   [address]         [facility]

s -



                                                Drive   [facility]  Medicine         l



                                                Medicine                 Outpati



                                                                        ent



                                                                        Clinics

 

        2018 Outpatient                 [facility]

[id number] [facility]



        12:15:00 12:15:00                         t [employer]   [address]         [facility]

s -



                                                Drive   [facility]  Medicine         l



                                                Medicine                 Outpati



                                                                        ent



                                                                        Clinics

 

        2018 Outpatient                 [facility]

[id number] [facility]



        09:30:00 09:30:00                         t [employer]   [address]         [facility]

s -



                                                Drive   [facility]  Medicine         l



                                                Medicine                 Outpati



                                                                        ent



                                                                        Clinics

 

        2018 Outpatient                 [facility]

[id number] [facility]



        11:15:00 11:15:00                         t [employer]   [address]         [facility]

s -



                                                Drive   [facility]  Medicine         l



                                                Medicine                 Outpati



                                                                        ent



                                                                        Clinics

 

        2018 Outpatient                 [facility]

[id number] [facility]



        11:30:00 11:30:00                         t [employer]   [address]         [facility]

s -



                                                Drive   [facility]  Medicine         l



                                                Medicine                 Outpati



                                                                        ent



                                                                        Clinics

 

        2018 Outpatient                 [facility]

[id number] [facility]



        14:47:00 14:47:00                         t Oak   [address]         [facility]

s -



                                                Drive   [facility]  Medicine         l



                                                Medicine                 Outpati



                                                                        ent



                                                                        Clinics

 

        2018 Outpatient                 [facility]

[id number] [facility]



        09:21:00 09:21:00                         t [employer]   [address]         [facility]

s -



                                                Drive   [facility]  Medicine         l



                                                Medicine                 Outpati



                                                                        ent



                                                                        Clinics

 

        2018 Outpatient                 [facility]

[id number] [facility]



        15:15:00 15:15:00                         t Oak   [address]         [employer]

s -



                                                Drive   [facility]



                                                Medicine                 Outpati



                                                                        ent



                                                                        Clinics

 

        2018 Outpatient                 [facility]

[id number] [facility]



        13:52:00 13:52:00                         t [employer]   [employer]

s -



                                                Drive   [facility]



                                                Medicine                 Outpati



                                                                        ent



                                                                        Clinics

 

        2018 Outpatient                 [facility]

[id number] [facility]



        13:42:00 13:42:00                         t [employer]   [employer]

s -



                                                Drive   [facility]



                                                Medicine                 Outpati



                                                                        ent



                                                                        Clinics

 

        2018 Outpatient                 [facility]

[id number] [facility]



        10:15:00 10:15:00                         t [employer]   [employer]

s -



                                                Drive   [facility]



                                                Medicine                 Outpati



                                                                        ent



                                                                        Clinics

 

        2018 Outpatient                 [facility]

[id number] [facility]



        10:15:00 10:15:00                         t [employer]   [employer]

s -



                                                Drive   [facility]



                                                Medicine                 Outpati



                                                                        ent



                                                                        Clinics

 

        2018 Outpatient                 [facility]

[id number] [facility]



        14:44:00 14:44:00                         t [employer]   [employer]

s -



                                                [employer]   [facility]



                                                Medicine                 Outpati



                                                                        ent



                                                                        Clinics







Results

This patient has no known results.

## 2021-04-01 LAB
BLD SMEAR INTERP: (no result)
BUN BLD-MCNC: 37 MG/DL (ref 7–18)
GLUCOSE SERPLBLD-MCNC: 180 MG/DL (ref 74–106)
HCT VFR BLD CALC: 26 % (ref 36–45)
INR BLD: 1.16
LYMPHOCYTES # SPEC AUTO: 0.8 K/UL (ref 0.7–4.9)
MORPHOLOGY BLD-IMP: (no result)
PMV BLD: 10.9 FL (ref 7.6–11.3)
POTASSIUM SERPL-SCNC: 4.9 MMOL/L (ref 3.5–5.1)
RBC # BLD: 2.68 M/UL (ref 3.86–4.86)

## 2021-04-01 NOTE — RAD REPORT
EXAM DESCRIPTION:  Qiana Llanes And Antonieta (2 Views)4/1/2021 12:01 pm

 

CLINICAL HISTORY:  Preop for cardiac catheterization

 

COMPARISON:  2020

 

FINDINGS:   The lungs appear clear of acute infiltrate. The heart is mildly enlarged.

 

Postsurgical changes chest. Compression deformity involves lower thoracic or upper lumbar vertebral b
addy

 

IMPRESSION:   No acute abnormalities displayed

## 2021-04-05 ENCOUNTER — HOSPITAL ENCOUNTER (OUTPATIENT)
Dept: HOSPITAL 97 - CCL | Age: 86
Discharge: HOME | End: 2021-04-05
Attending: INTERNAL MEDICINE
Payer: COMMERCIAL

## 2021-04-05 VITALS — TEMPERATURE: 97 F

## 2021-04-05 VITALS — OXYGEN SATURATION: 98 %

## 2021-04-05 VITALS — DIASTOLIC BLOOD PRESSURE: 42 MMHG | SYSTOLIC BLOOD PRESSURE: 173 MMHG

## 2021-04-05 DIAGNOSIS — I25.110: Primary | ICD-10-CM

## 2021-04-05 DIAGNOSIS — I50.32: ICD-10-CM

## 2021-04-05 DIAGNOSIS — E78.2: ICD-10-CM

## 2021-04-05 DIAGNOSIS — E11.22: ICD-10-CM

## 2021-04-05 DIAGNOSIS — I35.1: ICD-10-CM

## 2021-04-05 DIAGNOSIS — I25.82: ICD-10-CM

## 2021-04-05 DIAGNOSIS — I13.0: ICD-10-CM

## 2021-04-05 DIAGNOSIS — I25.700: ICD-10-CM

## 2021-04-05 DIAGNOSIS — Z98.61: ICD-10-CM

## 2021-04-05 DIAGNOSIS — N18.9: ICD-10-CM

## 2021-04-05 DIAGNOSIS — Z20.822: ICD-10-CM

## 2021-04-05 DIAGNOSIS — I65.23: ICD-10-CM

## 2021-04-05 PROCEDURE — 85610 PROTHROMBIN TIME: CPT

## 2021-04-05 PROCEDURE — 93455 CORONARY ART/GRFT ANGIO S&I: CPT

## 2021-04-05 PROCEDURE — 80048 BASIC METABOLIC PNL TOTAL CA: CPT

## 2021-04-05 PROCEDURE — 85730 THROMBOPLASTIN TIME PARTIAL: CPT

## 2021-04-05 PROCEDURE — 82947 ASSAY GLUCOSE BLOOD QUANT: CPT

## 2021-04-05 PROCEDURE — 93005 ELECTROCARDIOGRAM TRACING: CPT

## 2021-04-05 PROCEDURE — 85025 COMPLETE CBC W/AUTO DIFF WBC: CPT

## 2021-04-05 PROCEDURE — 36415 COLL VENOUS BLD VENIPUNCTURE: CPT

## 2021-04-05 PROCEDURE — 71046 X-RAY EXAM CHEST 2 VIEWS: CPT

## 2021-04-05 NOTE — OP
Date of Procedure:  04/05/2021



Surgeon:  James Velasquez MD



Assistant:  Mr. Thrasher.



Procedure:  Left heart catheterization, selective coronary arteriogram, vein graft injection to the R
CA and a LIMA injection.



History Of Present Illness:  Ms. Lancaster is an 85-year-old white woman with coronary artery disease, sta
tus post CABG, unstable angina, brought to the cath lab today as an outpatient, prepped and draped in
 the routine sterile fashion, given Versed and fentanyl for sedation.  A 6-[place] sheath introduced i
n the right common femoral artery successfully using the Seldinger technique and 10 cc of Xylocaine. 
 A JL4 catheter was used to select the left main.  Angiography there showed a 95% ostial circumflex s
tenosis and 80% proximal to ostial LAD stenosis.  Before a very large first diagonal, she had 100% mi
d LAD stenosis.  Patent LIMA to the LAD.  Patent vein graft to the RCA, severe distal PDA and postero
lateral disease.  Native RCA had multiple stenosis in the mid and distal area.



Complications:  There were no complications.



Blood Loss:  5 mL.



Postoperative Diagnosis:  Severe coronary artery disease. 



I think Ms. Lancaster would benefit from an angioplasty and stent of her ostium of the circumflex and mayb
e even the proximal LAD, so the diagonal can get better flow, but this is too high risk to be done he
re and we prefer [facility], I will send her to [place] to an interventional cardiologist of my choice.
  I will have her CD reviewed by them and then decide on the timing and the place.



Anesthesia:  Total conscious sedation was 45 minutes.





NB/ERIK

DD:  04/05/2021 09:40:09Voice ID:  [id number]

DT:  04/05/2021 12:38:20Report ID:  [id number]

## 2022-03-21 ENCOUNTER — HOSPITAL ENCOUNTER (OUTPATIENT)
Dept: HOSPITAL 97 - ER | Age: 87
Setting detail: OBSERVATION
LOS: 1 days | Discharge: HOME | End: 2022-03-22
Attending: HOSPITALIST | Admitting: INTERNAL MEDICINE
Payer: COMMERCIAL

## 2022-03-21 VITALS — BODY MASS INDEX: 27.6 KG/M2

## 2022-03-21 DIAGNOSIS — D64.9: ICD-10-CM

## 2022-03-21 DIAGNOSIS — Z95.1: ICD-10-CM

## 2022-03-21 DIAGNOSIS — Z90.49: ICD-10-CM

## 2022-03-21 DIAGNOSIS — N18.30: ICD-10-CM

## 2022-03-21 DIAGNOSIS — I13.0: ICD-10-CM

## 2022-03-21 DIAGNOSIS — F32.A: ICD-10-CM

## 2022-03-21 DIAGNOSIS — Z80.0: ICD-10-CM

## 2022-03-21 DIAGNOSIS — Z79.82: ICD-10-CM

## 2022-03-21 DIAGNOSIS — E78.5: ICD-10-CM

## 2022-03-21 DIAGNOSIS — I25.2: ICD-10-CM

## 2022-03-21 DIAGNOSIS — I48.0: ICD-10-CM

## 2022-03-21 DIAGNOSIS — I45.10: ICD-10-CM

## 2022-03-21 DIAGNOSIS — Z90.710: ICD-10-CM

## 2022-03-21 DIAGNOSIS — Z79.899: ICD-10-CM

## 2022-03-21 DIAGNOSIS — I25.10: ICD-10-CM

## 2022-03-21 DIAGNOSIS — E11.22: ICD-10-CM

## 2022-03-21 DIAGNOSIS — Z79.4: ICD-10-CM

## 2022-03-21 DIAGNOSIS — N17.9: ICD-10-CM

## 2022-03-21 DIAGNOSIS — Z20.822: ICD-10-CM

## 2022-03-21 DIAGNOSIS — R07.89: Primary | ICD-10-CM

## 2022-03-21 DIAGNOSIS — I50.32: ICD-10-CM

## 2022-03-21 LAB
BLD SMEAR INTERP: (no result)
BUN BLD-MCNC: 44 MG/DL (ref 7–18)
GLUCOSE SERPLBLD-MCNC: 129 MG/DL (ref 74–106)
HCT VFR BLD CALC: 28.9 % (ref 36–45)
LYMPHOCYTES # SPEC AUTO: 0.6 K/UL (ref 0.7–4.9)
MAGNESIUM SERPL-MCNC: 2.1 MG/DL (ref 1.8–2.4)
MORPHOLOGY BLD-IMP: (no result)
PMV BLD: 10.2 FL (ref 7.6–11.3)
POTASSIUM SERPL-SCNC: 5 MMOL/L (ref 3.5–5.1)
RBC # BLD: 3 M/UL (ref 3.86–4.86)
TROPONIN I SERPL HS-MCNC: 29.7 PG/ML (ref ?–58.9)
TSH SERPL DL<=0.05 MIU/L-ACNC: 2.49 UIU/ML (ref 0.36–3.74)

## 2022-03-21 PROCEDURE — 71045 X-RAY EXAM CHEST 1 VIEW: CPT

## 2022-03-21 PROCEDURE — 85025 COMPLETE CBC W/AUTO DIFF WBC: CPT

## 2022-03-21 PROCEDURE — 36415 COLL VENOUS BLD VENIPUNCTURE: CPT

## 2022-03-21 PROCEDURE — 93005 ELECTROCARDIOGRAM TRACING: CPT

## 2022-03-21 PROCEDURE — 83880 ASSAY OF NATRIURETIC PEPTIDE: CPT

## 2022-03-21 PROCEDURE — 80061 LIPID PANEL: CPT

## 2022-03-21 PROCEDURE — 99285 EMERGENCY DEPT VISIT HI MDM: CPT

## 2022-03-21 PROCEDURE — 82947 ASSAY GLUCOSE BLOOD QUANT: CPT

## 2022-03-21 PROCEDURE — 80053 COMPREHEN METABOLIC PANEL: CPT

## 2022-03-21 PROCEDURE — 80048 BASIC METABOLIC PNL TOTAL CA: CPT

## 2022-03-21 PROCEDURE — 84484 ASSAY OF TROPONIN QUANT: CPT

## 2022-03-21 PROCEDURE — 96374 THER/PROPH/DIAG INJ IV PUSH: CPT

## 2022-03-21 PROCEDURE — 84443 ASSAY THYROID STIM HORMONE: CPT

## 2022-03-21 PROCEDURE — 83735 ASSAY OF MAGNESIUM: CPT

## 2022-03-21 RX ADMIN — HUMAN INSULIN SCH: 100 INJECTION, SOLUTION SUBCUTANEOUS at 16:30

## 2022-03-21 RX ADMIN — APIXABAN SCH MG: 2.5 TABLET, FILM COATED ORAL at 21:00

## 2022-03-21 RX ADMIN — Medication SCH ML: at 21:00

## 2022-03-21 RX ADMIN — FAMOTIDINE SCH MG: 20 TABLET, FILM COATED ORAL at 16:00

## 2022-03-21 RX ADMIN — METOPROLOL TARTRATE SCH MG: 25 TABLET ORAL at 18:00

## 2022-03-21 RX ADMIN — ALBUMIN (HUMAN) SCH: 5 SOLUTION INTRAVENOUS at 16:00

## 2022-03-21 RX ADMIN — HUMAN INSULIN SCH UNIT: 100 INJECTION, SOLUTION SUBCUTANEOUS at 21:00

## 2022-03-21 NOTE — XMS REPORT
Continuity of Care Document

                            Created on:2022



Patient:KELLY SCRUGGS

Sex:Female

:1935

External Reference #:[id number]





Demographics







                          Address                   [address] [place] [id number]

 

                          Home Phone                [phone]

 

                          Work Phone                [phone]

 

                          Mobile Phone              [phone]

 

                          Email Address             [email or web address]

 

                          Preferred Language        spa

 

                          Marital Status            Unknown

 

                          Oriental orthodox Affiliation     Unknown

 

                          Race                      Unknown

 

                          Additional Race(s)        Unavailable



                                                    Unavailable

 

                          Ethnic Group              Unknown









Author







                          Organization              [facility]

t

 

                          Address                   [address]



                                                    [place] [address]

 

                          Phone                     [phone]









Support







                Name            Relationship    Address         Phone

 

                DENNISE            Unavailable     [address] [facility]    [phone]



                                                [place] [address] 

 

                SUNIL         Unavailable     .               [phone]



                                                [place] [address] 

 

                Tonny            Unavailable     [address] [phone]



                                                [place] [address] 

 

                dennise            Unavailable     Unavailable     [phone]









Care Team Providers







                    Name                Role                Phone

 

                    FEMI Lopez            Attending Clinician Unavailable

 

                    DARLYN              Attending Clinician Unavailable

 

                    ANABELA BERNARDO         Attending Clinician Unavailable

 

                    Darlyn              Attending Clinician Unavailable

 

                    DARLYN              Admitting Clinician Unavailable

 

                    ANABELA BERNARDO         Admitting Clinician Unavailable









Payers







           Payer Name Policy Type Policy Number Effective Date Expiration Date LD mccabe

 

           MEDICARE A B            [id number] 2000-10-01            



                                            00:00:00              

 

           MEDICAID [place]            [id number]                        







Problems

This patient has no known problems.



Allergies, Adverse Reactions, Alerts







       Allergy Allergy Status Severity Reaction(s) Onset  Inactive Treating Comm

ents 

Source



       Name   Type                        Date   Date   Clinician        

 

       No Known DA     Active U                                   [facility]



       Allergie                             -24                        Clear



       s                                  00:00:                      [place]



                                          00                          [facility]

 

       No Known DA     Active U                                   [facility]



       Allergie                             5-24                        Clear



       s                                  00:00:                      [place]



                                          00                          [facility]







Medications







       Ordered Filled Start  Stop   Current Ordering Indication Dosage Frequency

 Signature

                    Comments            Components          Source



     Medication Medication Date Date Medication? Clinician                (SIG) 

          



     Name Name                                                   

 

     Famotidine Famotidine -0      Yes  Na Lopez                1 tablet     

      [facility]



               5-11                               as needed           [facility]



               [facility]



               [facility]                                                



                                                                 [facility]



                                                                 ent



                                                                 Clinics

 

     Ranitidine Ranitidine -0      Yes  Na Lopez                1 tablet     

      [facility]



     HCl  HCl  5-11                                              [facility]



               [facility]



               00                                                l



                                                                 Outpati



                                                                 ent



                                                                 Clinics

 

     Macrobid Macrobid 2018-0      Yes  Na Lopez                1 capsule        

   [facility]



               9-24                               with food           [facility]



               [facility]



               00                                                l



                                                                 Outpati



                                                                 ent



                                                                 Clinics

 

     Estradiol Estradiol           Yes  Na Lopez                as             [facility]

[facility]



                                                  directed           [facility]



                                                                 [facility]



                                                                 l



                                                                 Outpati



                                                                 ent



                                                                 Clinics

 

     Isosorbide Isosorbide           Yes  Na Lopez                TOME CARLOS       

    [facility]



     Dinitrate Dinitrate                                    TABLETA           [facility]

[facility] -



                                                  TODOS LOS           [facility]



                                                  PADRON           l



                                                                 Outpati



                                                                 ent



                                                                 Clinics

 

     Toprol XL Toprol XL           Yes  Na Lopez                TAKE 1           

[facility]



                                                  TABLET BY           [facility]



                                                  MOUTH           [facility]



                                                  DAILY           l



                                                                 Outpati



                                                                 ent



                                                                 Clinics

 

     NovoFrazer NovoFine           Yes  Na Lopez                as             [facility]



     Plus Plus                                    directed           [facility]



                                                                 [facility]



                                                                 l



                                                                 Outpati



                                                                 ent



                                                                 Clinics

 

     Cozaar Cozaar           Yes  Na Lopez                1 tablet           [facility]



                                                                 [facility]



                                                                 [facility]



                                                                 l



                                                                 Outpati



                                                                 ent



                                                                 Clinics

 

     Metoprolol Metoprolol           Yes  Na Lopez                1 tablet       

    [facility]



     Succinate Succinate                                                   [facility]



     [facility]   [facility]



                                                                 [facility]



                                                                 [facility]



                                                                 ent



                                                                 Clinics

 

     Furosemide Furosemide           Yes  Na Lopez                1 tablet       

    [facility]



                                                                 [facility] -



                                                                 [facility]



                                                                 l



                                                                 Outpati



                                                                 ent



                                                                 Clinics

 

     Cetirizine Cetirizine           Yes  Na Lopez                TOME CARLOS       

    [facility]



     HCl  HCl                                     TABLETA           [facility]



                                                  TODOS LOS           [facility]



                                                  PADRON           l



                                                                 Outpati



                                                                 ent



                                                                 Clinics

 

     Omeprazole Omeprazole           Yes  Na Lopez                1 capsule      

     [facility]



                                                                 [facility] -



                                                                 [facility]



                                                                 l



                                                                 Outpati



                                                                 ent



                                                                 Clinics

 

     Promethazin Promethazin           Yes  Na Lopez                1 tablet     

      [facility]



     e HCl e HCl                                    as needed           [facility] -



                                                                 [facility]



                                                                 l



                                                                 Outpati



                                                                 ent



                                                                 Clinics

 

     Zocor Zocor           Yes  Na Lopez                TOME CARLOS           [facility]



                                                  TABLETA           [facility]



                                                  TODOS LOS           [facility]



                                                  PAD[facility]           l



                                                                 Outpati



                                                                 ent



                                                                 Clinics

 

     Trimethopri Trimethopri           Yes  Na Lopez                1 tablet     

      [facility]



     m    m                                                      [facility]



                                                                 [facility]



                                                                 l



                                                                 Outpati



                                                                 ent



                                                                 Clinics

 

     Tradjenta Tradjenta           Yes  Na Lopez                TOME CARLOS         

  [facility]



                                                  TABLETA           [facility]



                                                  TODOS LOS           [facility]



                                                  PAD[facility]           l



                                                                 Outpati



                                                                 ent



                                                                 Clinics

 

     NovoFine NovoFine           Yes  Na Lopez                as             [facility]



     Plus Plus                                    directed           [facility]



                                                                 [facility]



                                                                 l



                                                                 Outpati



                                                                 ent



                                                                 Clinics

 

     Furosemide Furosemide           Yes  Na Lopez                1 tablet       

    [facility]



                                                                 [facility]



                                                                 [facility]



                                                                 l



                                                                 Outpati



                                                                 ent



                                                                 Clinics

 

     Isosorbide Isosorbide           Yes  Na Lopez                1 tablet       

    [facility]



     Dinitrate Dinitrate                                                   [facility]

 -



                                                                 [facility]



                                                                 l



                                                                 Outpati



                                                                 ent



                                                                 Clinics

 

     Flonase Flonase           Yes  Na Lopez                1 spray in           

[facility]



                                                  each           [facility]



                                                  nostril           [facility]



                                                                 l



                                                                 [facility]



                                                                 ent



                                                                 Clinics

 

     Levemir Levemir           Yes  Na Lopez                50 units           [facility]

[facility]



                                                                 [facility]



                                                                 [facility]



                                                                 l



                                                                 [facility]



                                                                 ent



                                                                 Clinics

 

     Citalopram Citalopram           Yes  Na Lopez                TOME CARLOS       

    [facility]



     Hydrobromid Hydrobromid                                    TABLETA         

  [facility]



     e    e                                       POR VIA           [facility]



                                                  ORAL ONCE           l



                                                  A DAY           Outpati



                                                                 ent



                                                                 Clinics

 

     Simvastatin Simvastatin           Yes  Na Lopez                TOME CARLOS     

      [facility]



                                                  TABLETA           [facility]



                                                  TODOS LOS           [facility]



                                                  PAD[place] EN LA           l



                                                  NOCHE           [facility]



                                                                 ent



                                                                 Clinics

 

     Premarin Premarin           Yes  Na Lopez                1 gm           [facility]



                                                                 [facility]



                                                                 [facility]



                                                                 l



                                                                 [facility]



                                                                 ent



                                                                 Clinics

 

     BD Pen BD Pen           Yes  Na Lopez                USE 1           [facility]



     Needle Needle                                    NEEDLE           [facility]



     Short U/F Short U/F                                    WITH PEN 3          

 [facility]



                                                  TIMES           l



                                                                 [facility]



                                                                 ent



                                                                 Clinics

 

     NovoLog NovoLog           Yes  Na Lopez                as             [facility]



     Flexpen Flexpen                                    directed           [facility]

 -



                                                                 [facility]



                                                                 l



                                                                 [facility]



                                                                 ent



                                                                 Clinics

 

     Aspir-81 Aspir-81           Yes  Na Lopez                1 tablet           

[facility]



                                                                 [facility]



                                                                 [facility]



                                                                 l



                                                                 [facility]



                                                                 ent



                                                                 Clinics

 

     Simvastatin Simvastatin           Yes  Na Lopez                1 tablet     

      [facility]



                                                  in the           [facility]



                                                  evening           [facility]



                                                                 l



                                                                 [facility]



                                                                 ent



                                                                 Clinics







Immunizations







           Ordered    Filled Immunization Date       Status     Comments   [facility]

e



           Immunization Name Name                                        

 

           FluAD      FluAD      2019-12-10 Completed             [facility]



                                 00:00:00                         [facility]







Procedures

This patient has no known procedures.



Encounters







        Start   End     Encounter Admission Attending Care    Care    Encounter 

Source



        Date/Time Date/Time Type    Type    Clinicians Facility Department ID   

   

 

        2022         Outpatient         Yasmin Lopez [facility]  [id number]

[address] [facility]



        14:34:32                                                    [facility]



                                                                        [facility]



                                                                        l



                                                                        Outpati



                                                                        ent



                                                                        Clinics

 

        2022         Outpatient         Yasmin Lopez [facility]  [id number]

[address] [facility]



        14:27:41                                                 [id number]   [facility]



                                                                        l



                                                                        Outpati



                                                                        ent



                                                                        Clinics

 

        2022         Outpatient         Yasmin Lopez [facility]  [id number]

[address] [facility]



        14:21:08                                                 [id number]   [facility]



                                                                        l



                                                                        Outpati



                                                                        ent



                                                                        Clinics

 

        2022         Outpatient         Yasmin Lopez [facility]  [id number]

[address] [facility]



        14:01:03                                                 [id number]   [facility]



                                                                        l



                                                                        Outpati



                                                                        ent



                                                                        Clinics

 

        2022         Outpatient         Lopez, Na [facility]  [facility]  [id number]

[address] [facility]



        13:59:36                                                 [id number]   [facility]



                                                                        [facility]



                                                                        l



                                                                        Outpati



                                                                        ent



                                                                        Clinics

 

        2022         Outpatient         Lopez, Na [facility]  [facility]  [id number]

[address] [facility]



        13:53:51                                                 [id number]   [facility]



                                                                        [facility]



                                                                        l



                                                                        Outpati



                                                                        ent



                                                                        Clinics

 

        2022         Outpatient         Lopez, Na [facility]  [facility]  [id number]

[address] [facility]



        13:38:12                                                 [id number]   [facility]



                                                                        [facility]



                                                                        [facility]



                                                                        Clinics

 

        2022         Outpatient         Lopez, Na [facility]LML[facility]  STLMLC  [id number]

[address] [facility]



        13:37:50                                                 [id number]   [facility]



                                                                        [facility]pat[facility]



                                                                        ent



                                                                        Clinics

 

        2022         Outpatient         Lopez, Na [facility]  STLMLC  [id number]

[address] [facility]



        13:22:23                                                 [id number]   [facility]



                                                                        [facility]pat[facility]



                                                                        ent



                                                                        Clinics

 

        2022         Outpatient         Lopez, Na [facility]LML[facility]  STLMLC  [id number]

[address] [facility]



        12:54:21                                                 [id number]   [facility]



                                                                        [facility]



                                                                        ent



                                                                        Clinics

 

        2022         Outpatient         Lopez, Na STLML[facility]  STLMLC  [id number]

[address] [facility]



        12:50:26                                                 [id number]   [facility]



                                                                        [facility]



                                                                        ent



                                                                        Clinics

 

        2022         Outpatient         Jessica, Na STLMLC  STLMLC  [id number]

[address] [facility]



        12:17:30                                                 [id number]   [facility]



                                                                        [facility]i



                                                                        ent



                                                                        Clinics

 

        2022         Outpatient         Jessica, Na STLMLC  STLMLC  [id number]

[address] [facility]



        12:16:14                                                 [id number]   [facility]



                                                                        [facility]



                                                                        ent



                                                                        Clinics

 

        2022         Outpatient         Lopez, Na STLMLC  STLMLC  [id number]

[address] [facility]



        11:59:36                                                 [id number]   [facility]



                                                                        [facility]



                                                                        ent



                                                                        Clinics

 

        2022         Outpatient         Lopez, Na STLMLC  STLMLC  [id number]

[address] [facility]



        11:58:00                                                 [id number]   [facility]



                                                                        [facility]



                                                                        ent



                                                                        Clinics

 

        2022         Outpatient         Lopez, Na STLMLC  STLMLC  [id number]

[address] [facility]



        11:45:30                                                 [id number]   [facility]



                                                                        [facility]



                                                                        ent



                                                                        Clinics

 

        2022         Outpatient         Lopez, Na STLMLC  STLMLC  [id number]

[address] [facility]



        11:23:16                                                 [id number]   [facility]



                                                                        [facility]



                                                                        ent



                                                                        Clinics

 

        2022         Outpatient         Yasmin Lopez [facility]  STLMLC  [id number]

[address] [facility]



        11:17:39                                                 [id number]   [facility]



                                                                        [facility]



                                                                        ent



                                                                        Clinics

 

        2021-10-09         Outpatient         [facility]LAN, [facility]    Surgery [id number]

 [facility]



        15:26:56                         [facility]                          

 

        2021-10-09         Outpatient         RASLAN, [facility]    Surgery [id number]

 [facility]



        12:31:01                         [facility]                          

 

        2021-10-09         Inpatient         RASLAN, [facility]    Surgery [id number] 

[facility]



        11:38:46                         [facility]                          

 

        2021-10-09         Inpatient UR      TOVA, [facility]   Cardiology [id number]

[address] [facility]



        11:38:20                         [facility]

 

        2021         Inpatient         Raslan, [facility]   OUTD    [id number] 

[facility]



        09:45:00                         Chaim                  [id number]  [facility]

 

        2021         Inpatient         Raslan, [facility]   OUTD    [id number] 

[facility]



        11:00:00                         Chaim                  [id number]  [facility]

 

        2022 ambulatory                 [facility]  [facility]  [id number]

 [facility]



        00:00:00 00:00:00                                                 [facility]



                                                                        [facility]i



                                                                        ent



                                                                        Clinics

 

        2022 ambulatory                 STLMLC  STLMLC  [id number]

 [facility]



        00:00:00 00:00:00                                                 [facility]



                                                                        [facility]



                                                                        ent



                                                                        Clinics

 

        2021 ambulatory                 STL[facility]C  STLMLC  [id number]

 [facility]



        00:00:00 00:00:00                                                 [facility]



                                                                        [facility]



                                                                        ent



                                                                        Clinics

 

        2021 ambulatory                 STLMLC  STLMLC  [id number]

 [facility]



        00:00:00 00:00:00                                                 [facility]



                                                                        [facility]i



                                                                        ent



                                                                        Clinics

 

        2021-10-19 2021-10-19 Outpatient                 [facility]C  STLMLC  [id number]

 [facility]



        00:00:00 00:00:00                                                 [facility]



                                                                        [facility]pati



                                                                        ent



                                                                        Clinics

 

        2021-10-14 2021-10-14 Outpatient                 STLMLC  STLMLC  [id number]

 [facility]



        00:00:00 00:00:00                                                 [facility]



                                                                        [facility]i



                                                                        ent



                                                                        Clinics

 

        2021-09-10 2021-09-10 Outpatient                 STLMLC  STLMLC  [id number]

 [facility]



        00:00:00 00:00:00                                                 [facility]



                                                                        [facility]



                                                                        [facility]pati



                                                                        ent



                                                                        Clinics

 

        2021 Outpatient                 STLMLC  STLMLC  [id number]

 [facility]



        00:00:00 00:00:00                                                 [facility]



                                                                        [facility]i



                                                                        ent



                                                                        Clinics

 

        2021 Outpatient                 STLMLC  STLMLC  [id number]

 [facility]



        00:00:00 00:00:00                                                 [facility]



                                                                        [facility]i



                                                                        ent



                                                                        Clinics

 

        2021 Outpatient                 STLMLC  STLMLC  [id number]

 [facility]



        00:00:00 00:00:00                                                 [facility]



                                                                        [facility]i



                                                                        ent



                                                                        Clinics

 

        2021 Outpatient                 STLMLC  STLMLC  [id number]

 [facility]



        00:00:00 00:00:00                                                 [facility]



                                                                        [facility]i



                                                                        ent



                                                                        Clinics

 

        2021 Outpatient                 STLMLC  STLMLC  [id number]

 [facility]



        00:00:00 00:00:00                                                 [facility]



                                                                        [facility]pati



                                                                        ent



                                                                        Clinics

 

        2021 Outpatient                 STLMLC  STLMLC  [id number]

 [facility]



        00:00:00 00:00:00                                                 [facility]



                                                                        [facility]



                                                                        [facility]pati



                                                                        ent



                                                                        Clinics

 

        2021 Outpatient                 STLMLC  STLMLC  [id number]

 [facility]



        00:00:00 00:00:00                                                 [facility]



                                                                        [facility]



                                                                        [facility]pati



                                                                        ent



                                                                        Clinics

 

        2021 Outpatient                 STLMLC  STLMLC  [id number]

 [facility]



        00:00:00 00:00:00                                                 [facility]



                                                                        [facility]



                                                                        [facility]pati



                                                                        ent



                                                                        Clinics

 

        2021 Outpatient                 STLMLC  STLMLC  [id number]

 [facility]



        00:00:00 00:00:00                                                 [facility]



                                                                        [facility]



                                                                        [facility]pati



                                                                        ent



                                                                        Clinics

 

        2021 Outpatient                 STLMLC  STLMLC  [id number]

 [facility]



        00:00:00 00:00:00                                                 [facility]



                                                                        [facility]



                                                                        [facility]pati



                                                                        ent



                                                                        Clinics

 

        2021 Outpatient                 STLMLC  STLMLC  [id number]

 [facility]



        00:00:00 00:00:00                                                 [facility]



                                                                        [facility]



                                                                        [facility]pati



                                                                        ent



                                                                        Clinics

 

        2021 Outpatient                 STLMLC  STLMLC  [id number]

 [facility]



        00:00:00 00:00:00                                                 [facility]



                                                                        [facility]



                                                                        Outpati



                                                                        ent



                                                                        Clinics

 

        2021 Outpatient                 STLMLC  STLMLC  [id number]

 [facility]



        00:00:00 00:00:00                                                 [facility]



                                                                        [facility]



                                                                        [facility]pati



                                                                        ent



                                                                        Clinics

 

        2020 Outpatient                 STLMLC  STLMLC  [id number]

 [facility]



        00:00:00 00:00:00                                                 [facility]



                                                                        [facility]



                                                                        Outpati



                                                                        ent



                                                                        Clinics

 

        2020 Outpatient                 STLMLC  STLMLC  [id number]

 [facility]



        00:00:00 00:00:00                                                 [facility]



                                                                        [facility]



                                                                        Outpati



                                                                        ent



                                                                        Clinics

 

        2020-10-29 2020-10-29 Outpatient                 STLMLC  STLMLC  [id number]

 [facility]



        00:00:00 00:00:00                                                 [facility]



                                                                        [facility]



                                                                        Outpati



                                                                        ent



                                                                        Clinics

 

        2020-10-23 2020-10-23 Outpatient                 STLMLC  STLMLC  [id number]

 [facility]



        00:00:00 00:00:00                                                 [facility]



                                                                        [facility]



                                                                        Outpati



                                                                        ent



                                                                        Clinics

 

        2020 Outpatient                 STLMLC  STLMLC  [id number]

 [facility]



        00:00:00 00:00:00                                                 [facility]



                                                                        [facility]



                                                                        Outpati



                                                                        ent



                                                                        Clinics

 

        2020 Outpatient                 [facility] [address]

[id number] [facility]



        09:40:00 09:40:00                         [place]   [address]         [facility]

s -



                                                Drive   [facility]



                                                Family  Medicine         l



                                                Medicine                 Outpati



                                                                        ent



                                                                        Clinics

 

        2020 Outpatient                 [facility] [address]

[id number] [facility]



        09:40:00 09:40:00                         [place]   [address]         [facility]

s -



                                                Drive   [facility]



                                                Family  Medicine         l



                                                Medicine                 Outpati



                                                                        ent



                                                                        Clinics

 

        2020 Outpatient                 [facility] [address]

[id number] [facility]



        14:40:00 14:40:00                         [place]   [address]         [facility]

s -



                                                Drive   [facility]



                                                [facility]  [facility]         l



                                                Medicine                 Outpati



                                                                        ent



                                                                        Clinics

 

        2020 Outpatient                 [facility] [address]

[id number] [facility]



        11:00:00 11:00:00                         t [employer]

s -



                                                Drive   [facility]  Medicine         l



                                                Medicine                 Outpati



                                                                        ent



                                                                        Clinics

 

        2020 Outpatient                 [facility]

[id number] [facility]



        14:00:00 14:00:00                         t       Specialty/U         Beba

kes -



                                                Specialty rology          [facility]

a



                                                [facility]          l



                                                Clinic                  Outpati



                                                                        ent



                                                                        Clinics

 

        2020 Outpatient                 [facility]

[id number] [facility]



        08:53:00 08:53:00                         t [facility]         [employer]

s -



                                                [employer]    [facility]  Medicine         l



                                                Medicine                 Outpati



                                                                        ent



                                                                        Clinics

 

        2020 Outpatient                 [facility]

[id number] [facility]



        09:31:00 09:31:00                         t [employer]

s -



                                                Xceive   [facility]  Medicine         l



                                                Medicine                 Outpati



                                                                        ent



                                                                        Clinics

 

        2020 Outpatient                 [facility]

[id number] [facility]



        09:20:00 09:20:00                         t [employer]

s -



                                                Drive   [facility]  Medicine         l



                                                Medicine                 Outpati



                                                                        ent



                                                                        Clinics

 

        2020 Outpatient                 [facility]

[id number] [facility]



        08:40:00 08:40:00                         t [employer]

s -



                                                Drive   [facility]  Medicine         l



                                                Medicine                 Outpati



                                                                        ent



                                                                        Clinics

 

        2020 Outpatient                 [facility]

[id number] [facility]



        16:55:00 16:55:00                         t [employer]

s -



                                                Xceive   [facility]  Medicine         l



                                                Medicine                 Outpati



                                                                        ent



                                                                        Clinics

 

        2020 Outpatient                 [facility]

[id number] [facility]



        11:00:00 11:00:00                         t       Specialty/U         [facility]

kes -



                                                Specialty rology          [facility]

a



                                                /[facility] Clinic          l



                                                Clinic                  Outpati



                                                                        ent



                                                                        Clinics

 

        2019 Outpatient                 [facility]

[id number] [facility]



        15:51:00 15:51:00                         t [employer]

s -



                                                Drive   [facility]  Medicine         l



                                                Medicine                 Outpati



                                                                        ent



                                                                        Clinics

 

        2019-12-10 2019-12-10 Outpatient                 [facility]

[id number] [facility]



        09:40:00 09:40:00                         t [place]   [place]         [place]

s -



                                                Drive   [facility]                 Outpati



                                                                        ent



                                                                        Clinics

 

        2019 Outpatient                 [facility]

[id number] [facility]



        16:28:00 16:28:00                         t [place]   [employer]

s -



                                                Drive   [facility]                 Outpati



                                                                        ent



                                                                        Clinics

 

        2019 Outpatient                 [facility]

[id number] [facility]



        09:52:00 09:52:00                         t       Specialty/U         [facility]eba

kes -



                                                Specialty rology          [facility]

a



                                                /[facility]rology Clinic          l



                                                Clinic                  Outpati



                                                                        ent



                                                                        Clinics

 

        2019 Outpatient                 [facility]

[id number] [facility]



        14:46:00 14:46:00                         t       Specialty/U         [facility]

kes -



                                                Specialty rology          [facility]

a



                                                /[facility] Clinic          l



                                                Clinic                  Outpati



                                                                        ent



                                                                        Clinics

 

        2019 Outpatient                 [facility]

[id number] [facility]



        10:25:00 10:25:00                         t [facility]         L

[facility]



                                                [facility]



                                                                        Outpati



                                                                        ent



                                                                        Clinics

 

        2019 Outpatient                 [facility]

[id number] [facility]



        09:15:00 09:15:00                         t       Specialty/U         [facility]

kes -



                                                Specialty rology          [facility]

a



                                                /[facility] Clinic          l



                                                Clinic                  Outpati



                                                                        ent



                                                                        Clinics

 

        2019-10-03 2019-10-03 Outpatient                 [facility]

[id number] [facility]



        09:15:00 09:15:00                         t       Specialty/U         Beba

kes -



                                                Specialty rology          [facility]

a



                                                /[facility] Clinic          l



                                                Clinic                  Outpati



                                                                        ent



                                                                        Clinics

 

        2019-09-10 2019-09-10 Outpatient                 [facility]

[id number] [facility]



        08:40:00 08:40:00                         t Oak   [address]         [employer]

s -



                                                Drive   [facility]                 Outpati



                                                                        ent



                                                                        Clinics

 

        2019 Outpatient                 [facility]

[id number] [facility]



        13:44:00 13:44:00                         t       Specialty/U         Beba

kes -



                                                Specialty rology          [facility]

a



                                                /[facility]rology Clinic          l



                                                Clinic                  Outpati



                                                                        ent



                                                                        Clinics

 

        2019-07-15 2019-07-15 Outpatient                 [facility]

[id number] [facility]



        13:45:00 13:45:00                         t       Specialty/U         [facility]eba

kes -



                                                Specialty rology          [facility]

a



                                                [facility] Clinic          l



                                                Clinic                  Outpati



                                                                        ent



                                                                        Clinics

 

        2019 Outpatient                 [facility]

[id number] [facility]



        10:15:00 10:15:00                         t       Specialty/U         [facility]eba

kes -



                                                Specialty rology          [facility]

a



                                                /[facility] Clinic          l



                                                Clinic                  Outpati



                                                                        ent



                                                                        Clinics

 

        2019 Outpatient                 [facility]

[id number] [facility]



        15:07:00 15:07:00                         t Oak   [employer]

s [place]   [facility]                 Outpati



                                                                        ent



                                                                        Clinics

 

        2019 Outpatient                 [facility]

[id number] [facility]



        11:20:00 11:20:00                         t [employer]   [facility]                 Outpati



                                                                        ent



                                                                        Clinics

 

        2019-05-15 2019-05-15 Outpatient                 [facility]

[id number] [facility]



        11:38:00 11:38:00                         t [employer]up   [facility]                 Outpati



                                                                        ent



                                                                        Clinics

 

        2019 Outpatient                 [facility]

[id number] [facility]



        11:00:00 11:00:00                         t       Specialty/U         [facility]

kes -



                                                Specialty rology          [facility]

a



                                                /[facility] Clinic          l



                                                Clinic                  Outpati



                                                                        ent



                                                                        Clinics

 

        2019 Outpatient                 [facility]

[id number] [facility]



        10:28:00 10:28:00                         t       Specialty/U         Beba

kes -



                                                Specialty rology          [facility]

a



                                                /[facility] Clinic          l



                                                Clinic                  Outpati



                                                                        ent



                                                                        Clinics

 

        2019 Outpatient                 [facility]

[id number] [facility]



        13:50:00 13:50:00                         t       Specialty/U         [facility]

kes -



                                                Specialty rology          [facility]

a



                                                /[facility] Clinic          l



                                                Clinic                  Outpati



                                                                        ent



                                                                        Clinics

 

        2019 Outpatient                 [facility]

[id number] [facility]



        09:00:00 09:00:00                         t       Specialty/U         [facility]eba

kes -



                                                Specialty rology          [facility]

a



                                                /[facility]rology Clinic          l



                                                Clinic                  Outpati



                                                                        ent



                                                                        Clinics

 

        2019 Outpatient                 [facility]

[id number] [facility]



        18:12:00 18:12:00                         t [employer]Greenup   [facility]  Medicine         l



                                                Medicine                 Outpati



                                                                        ent



                                                                        Clinics

 

        2019 Outpatient                 [facility]

[id number] [facility]



        09:45:00 09:45:00                         t Oak   [employer]

s -



                                                Drive   [facility]



                                                Medicine                 Outpati



                                                                        ent



                                                                        Clinics

 

        2019 Outpatient                 [facility]

[id number] [facility]



        09:09:00 09:09:00                         t       Specialty/U         [facility]

kes -



                                                Specialty rology          [facility]

a



                                                /[facility] Clinic          l



                                                Clinic                  Outpati



                                                                        ent



                                                                        Clinics

 

        2019 Outpatient                 [facility]

[id number] [facility]



        10:22:00 10:22:00                         t Oak   [employer]

s -



                                                Xceive   [facility]



                                                Medicine                 Outpati



                                                                        ent



                                                                        Clinics

 

        2019 Outpatient                 [facility]

[id number] [facility]



        11:03:00 11:03:00                         t Oak   [employer]

s -



                                                Xceive   [facility]



                                                Medicine                 Outpati



                                                                        ent



                                                                        Clinics

 

        2019 Outpatient                 [facility]

[id number] [facility]



        11:30:00 11:30:00                         t       Specialty/U         Beba

kes -



                                                Specialty rology          [facility]

a



                                                /[facility] Clinic          l



                                                Clinic                  Outpati



                                                                        ent



                                                                        Clinics

 

        2019 Outpatient                 [facility]

[id number] [facility]



        09:20:00 09:20:00                         t Oak   [employer]

s -



                                                Xceive   [facility]



                                                Medicine                 Outpati



                                                                        ent



                                                                        Clinics

 

        2019 Outpatient                 [facility]

[id number] [facility]



        09:30:00 09:30:00                         t [place]   [employer]

s -



                                                Drive   [facility]



                                                Medicine                 Outpati



                                                                        ent



                                                                        Clinics

 

        2018 Outpatient                 [facility]

[id number] [facility]



        12:15:00 12:15:00                         t Oak   [employer]

s -



                                                Xceive   [facility]



                                                Medicine                 Outpati



                                                                        ent



                                                                        Clinics

 

        2018 Outpatient                 [facility]

[id number] [facility]



        09:30:00 09:30:00                         t Oak   [employer]

s -



                                                Drive   [facility]



                                                Medicine                 Outpati



                                                                        ent



                                                                        Clinics

 

        2018 2018 Outpatient                 [facility]

[id number] [facility]



        11:15:00 11:15:00                         t Oak   [address]         [place]

s -



                                                Drive   [facility]



                                                Family  Medicine         l



                                                Medicine                 Outpati



                                                                        ent



                                                                        Clinics

 

        2018 Outpatient                 [facility]

[id number] [facility]



        11:30:00 11:30:00                         t Oak   [place] [employer]         [employer]

s -



                                                Drive   [facility]  Medicine         l



                                                Medicine                 Outpati



                                                                        ent



                                                                        Clinics

 

        2018 Outpatient                 [facility]

[id number] [facility]



        14:47:00 14:47:00                         t Oak   [place] [employer]         [place]

s -



                                                Drive   [facility]  Medicine         l



                                                Medicine                 Outpati



                                                                        ent



                                                                        Clinics

 

        2018 Outpatient                 [facility]

[id number] [facility]



        09:21:00 09:21:00                         t Oak   [place] [employer]

s -



                                                Drive   [facility]  Medicine         l



                                                Medicine                 Outpati



                                                                        ent



                                                                        Clinics

 

        2018 Outpatient                 [facility]

[id number] [facility]



        15:15:00 15:15:00                         t Oak   [place] Xceive         [employer]

s -



                                                Drive   [facility]  Medicine         l



                                                Medicine                 Outpati



                                                                        ent



                                                                        Clinics

 

        2018 Outpatient                 [facility]

[id number] [facility]



        13:52:00 13:52:00                         t Oak   [place] Xceive         [employer]

s -



                                                Drive   [facility]  Medicine         l



                                                Medicine                 Outpati



                                                                        ent



                                                                        Clinics

 

        2018 Outpatient                 [facility]

[id number] [facility]



        13:42:00 13:42:00                         t Oak   [place] [employer]

s -



                                                Drive   [facility]  Medicine         l



                                                Medicine                 Outpati



                                                                        ent



                                                                        Clinics

 

        2018 Outpatient                 [facility]

[id number] [facility]



        10:15:00 10:15:00                         t Oak   [place] [employer]

s -



                                                Drive   [facility]  Medicine         l



                                                Medicine                 Outpati



                                                                        ent



                                                                        Clinics

 

        2018 Outpatient                 [facility]

[id number] [facility]



        10:15:00 10:15:00                         t Oak   [place] [employer]

s -



                                                Drive   [facility]  Medicine         l



                                                Medicine                 Outpati



                                                                        ent



                                                                        Clinics

 

        2018 Outpatient                 [facility]

[id number] [facility]



        14:44:00 14:44:00                         t Oak   [place] [employer]

s -



                                                Drive   [facility]         l



                                                Medicine                 Outpati



                                                                        ent



                                                                        Clinics







Results







           Test Description Test Time  Test Comments Results    Result Comments 

Source









                    ACT-ISTAT           2021 14:05:00 









                      Test Item  Value      Reference Range Interpretation Comme

nts









             ACT-ISTAT (test code = ACTI) 301 SEC             H           

 Performed by certified  at



                                                                 [facility]



[id number] 13:45:00





             Test Item    Value        Reference Range Interpretation Comments

 

             ACT-ISTAT (test code 263 SEC             H            Perform

ed by certified



             = ACTI)                                              at  [facility]



[id number] 09:37:00





             Test Item    Value        Reference Range Interpretation Comments

 

             GLUBED (test code = 111 MG/DL           H            Performe

d by certified



             GLUBED)                                              at  [facility]



- XR CHEST 2 -00-48 11:55:00
************************************************************[facility]        : 1935        Sex: 
F************************************************************ FAX: Chaim Lynne MD    [phone]    [place]:   St: PRE--------------------
-----------------------------------------------------------  Name:   KELLY SCRUGGS [facility]                    : 1935  Age/S: 85/F           
[address] [place] [address]      Unit #: [id number]      Loc: [place] [address]                 Phys: Chaim Rush MD                                
                  Acct: [id number] Dis Date:               Status: PRE SDC    
                           PHONE #: [phone]     Exam Date:     2021  
  1145                   FAX #: [phone]     Reason: [facility]    EXAMS:          
                                    CPT CODE:      [id number] XR CHEST 2 V       
                [id number]                    PROCEDURE: CHEST TWO VIEW              
INDICATION:LHC; abnormal stress test; Preoperative assessment               
COMPARISON: There are no previous relevant studies available for       
correlation.               FINDINGS: Clothing artifact noted. There is mild 
bronchial wall       thickening. There is no consolidation. No pneumothorax or 
pleural  effusion. Cardiac silhouette is mildly enlarged. Calcified plaque      
thoracic aorta. Coronary arterial calcifications and metallic stents.       The 
pulmonary vasculature is normal. The tracheal air shadow is       midline. The 
bones appear osteopenic. There is a compression deformity       thoracolumbar 
junction with approximately 70% loss of vertebral body       height. No 
retropulsion or malalignment. Previous median sternotomy.       Mediastinal 
clips compatible with prior CABG.    IMPRESSION:          1. Mild cardiomegaly. 
Coronary arterial disease with prior stentingand CABG.         2. Mild bronchial
wall thickening. Mild interstitial edema and         inflammation in the 
differential. Otherwise, no overt failure at this         time.         3. 
Osteopenia. Compression fracture thoracolumbar junction, age         
indeterminate.                             SL:  [id number]                    
** Electronically Signed by PATO Mims **           **      on 
2021 at 1155             **                      Reported and signed by: 
Armando Mims M.D.           CC: Chaim Rush MD                            
                             Technologist: RT Darion(BRONSON)                
         Trnscrd Date/Time/By: 2021 (7716) : By: DustyL           Orig 
Print D/T: S: 2021 (9674)                         PAGE  1                 
     Signed ReportBASIC METABOLIC [id number] 11:33:00





             Test Item    Value        Reference Range Interpretation Comments

 

             SODIUM (test code = NA) 141 mEq/L    134-147      N            

 

             POTASSIUM (test code = 4.9 mEq/L    3.4-5.0      N            



             K)                                                  

 

             CHLORIDE (test code = 107 mEq/L    100-108      N            



             CL)                                                 

 

             CARBON DIOXIDE (test 29 mEq/l     21-33        N            



             code = CO2)                                         

 

             ANION GAP (test code = 10           0-20         N            



             GAP)                                                

 

             GLUCOSE (test code = 54 mg/dL            L            



             GLU)                                                

 

             BLOOD UREA NITROGEN 30 mg/dL     7-18         H            



             (test code = BUN)                                        

 

             GLOMERULAR FILTRATION 38.9         70-80        L            Units 

of measure =



             RATE (test code = GFR)                                        ml/mi

n/1.73 m2

 

             CREATININE (test code = 1.3 mg/dL    0.6-1.3      N            



             CREAT)                                              

 

             CALCIUM (test code = 9.4 mg/dL    8.0-10.5     N            



             CA)                                                 



PROTHROMBIN [id number] 11:22:00





             Test Item    Value        Reference Range Interpretation Comments

 

             PROTHROMBIN TIME 15.0 SECONDS 9.3-12.9     H            



             PATIENT (test code =                                        



             PTP)                                                

 

             INTERNATIONAL NORMAL 1.4          0.8-1.2      H                   

         TARGET



             RATIO (test code =                                        INR BY IN

DICATION



             INR)                                                Indication



                                                                 



                                                                   INR1. Prophyl

axis of



                                                                 venous thrombos

is



                                                                        2.0 - 3.

0



                                                                 (orthopedic fred

laila),



                                                                 Prophylaxis of



                                                                 venous thrombos

is



                                                                 (other than hig

h-risk



                                                                  surgery), Lakia

tment



                                                                 of Deep Vein



                                                                 Thrombosis/Pulm

onary



                                                                 Embolism, Preve

ntion



                                                                 of systemic emb

olism -



                                                                 Tissue heart va

lves,



                                                                 Acute Myocardia

l



                                                                 Infarction (to 

prevent



                                                                   systemic embo

lism),



                                                                 Valvular heart



                                                                 disease,   Atri

al



                                                                 Fibrillation,



                                                                 Bileaflet mecha

nical



                                                                 valve in aortic



                                                                 position.2. Mec

hanical



                                                                 prosthetic valv

es



                                                                 (high risk),   

 2.5 -



                                                                 3.5   Presence 

of



                                                                 Lupus Anticoagu

lant or



                                                                   Antiphospholi

pid



                                                                 Antibodies, Pre

vention



                                                                 of   systemic e

mbolism



                                                                 - Acute Myocard

ial



                                                                 Infarction   (t

o



                                                                 prevent recurre

nt



                                                                 infarct).



CBC W/AUTO [id number] 11:14:00





             Test Item    Value        Reference Range Interpretation Comments

 

             WHITE BLOOD CELL (test code = 5.0 x10 3/uL 4.5-11.0     N          

  



             WBC)                                                

 

             RED BLOOD CELL (test code = 2.84 x10 6/uL 3.54-5.02    L           

 



             RBC)                                                

 

             HEMOGLOBIN (test code = HGB) 9.0 g/dL     11.0-15.0    L           

 

 

             HEMATOCRIT (test code = HCT) 29.0 %       33.0-45.0    L           

 

 

             MEAN CELL VOLUME (test code = 102.1 fL     81.0-99.0    H          

  



             MCV)                                                

 

             MEAN CELL HGB (test code = MCH) 31.7 pg      27.0-33.0    N        

    

 

             MEAN CELL HGB CONCETRATION 31.0 g/dL    33.0-37.0    L            



             (test code = MCHC)                                        

 

             RED CELL DISTRIBUTION WIDTH CV 12.1 %       11.5-14.5    N         

   



             (test code = RDW)                                        

 

             RED CELL DISTRIBUTION WIDTH SD 45.1 fL      37.0-54.0    N         

   



             (test code = RDW-SD)                                        

 

             PLATELET COUNT (test code = 100 x10 3/uL 150-400      L            



             PLT)                                                

 

             MEAN PLATELET VOLUME (test code 12.2 fL      7.0-9.0      H        

    



             = MPV)                                              

 

             NEUTROPHIL % (test code = NT%) 71.4 %       56.0-77.0    N         

   

 

             IMMATURE GRANULOCYTE % (test 0.2 %        0.0-2.0      N           

 



             code = IG%)                                         

 

             LYMPHOCYTE % (test code = LY%) 18.6 %       14.0-32.0    N         

   

 

             MONOCYTE % (test code = MO%) 7.8 %        4.8-9.0      N           

 

 

             EOSINOPHIL % (test code = EO%) 1.6 %        0.3-3.7      N         

   

 

             BASOPHIL % (test code = BA%) 0.4 %        0.0-2.0      N           

 

 

             NUCLEATED RBC % (test code = 0.0 %        0-0          N           

 



             NRBC%)                                              

 

             NEUTROPHIL # (test code = NT#) 3.57 x10 3/uL 2.0-7.6      N        

    

 

             IMMATURE GRANULOCYTE # (test 0.01 x10 3/uL 0.00-0.03    N          

  



             code = IG#)                                         

 

             LYMPHOCYTE # (test code = LY#) 0.93 x10 3/uL 1.0-3.8      L        

    

 

             MONOCYTE # (test code = MO#) 0.39 x10 3/uL 0.1-0.8      N          

  

 

             EOSINOPHIL # (test code = EO#) 0.08 x10 3/uL 0.0-0.2      N        

    

 

             BASOPHIL # (test code = BA#) 0.02 x10 3/uL 0.0-0.2      N          

  

 

             NUCLEATED RBC # (test code = 0.00 x10 3/uL 0.0-0.1      N          

  



             NRBC#)                                              

 

             MANUAL DIFF REQUIRED (test code NO                                 

    



             = MDIFF)

## 2022-03-21 NOTE — P.HP
Certification for Inpatient


Patient admitted to: Observation


With expected LOS: <2 Midnights


Patient will require the following post-hospital care: None


Practitioner: I am a practitioner with admitting privileges, knowledge of 

patient current condition, hospital course, and medical plan of care.


Services: Services provided to patient in accordance with Admission requirements

found in Title 42 Section 412.3 of the Code of Federal Regulations





Patient History


Date of Service: 03/21/22


Reason for admission: Chest pain shortness of breath


History of Present Illness: 





86-year-old female with past medical history of hypertension, diabetes mellitus,

HLD, CAD status post CABG, with subsequent abnormal stress test and PCIlast was

2 years ago followed with laser procedure done 1 year ago; follows with Dr. Velasquez, presented to the hospital today because of substernal chest pressure 

which started yesterday while bleeding inside the house.  Pain was radiating to 

the mid back she denies any radiation to the left arm.  She stated pain was 

worse to 10 out of 10 yesterday and associated with mild palpitation but she 

denies any dizziness, nausea or vomiting.  Pain later became intermittent but 

recur again today and she has presented to the ED.  She admits to exertional 

dyspnea.  She denies any worsening body swelling.  She states she is adherent 

with her medication.  She does regularly take Lasix as part of her diuretic 

regimen.  She is unsure if she has any history of heart failure.  She is mainly 

[place]-speaking and has daughter who helps with her medication in the room with

her.  She is rates pain now at about 3 out of 10.  She denies any headache or 

dizziness.  On admission EKG shows atrial fibrillation with right bundle branch 

block.  She denies any prior history of A. fib.  Although previous hospital 

records suggest A. fib.  She is not on any anticoagulation.  Initial set of 

cardiac enzyme was negative.  Chest x-ray shows mild pulmonary edema.  She is 

satting well on room air.  She has been admitted for rule out acute coronary 

syndrome as well as CHF exacerbation


Allergies





No Known Allergies Allergy (Verified 04/01/21 11:20)


   





Home Medications: 








Aspirin [Aspirin EC 81 MG] 81 mg PO DAILY 08/16/18 


Cetirizine HCl [Zyrtec] 10 mg PO DAILY 08/16/18 


Citalopram Hydrobromide [Citalopram HBr] 10 mg PO BEDTIME 08/16/18 


Insulin Aspart [Novolog Flexpen] 20 units SQ SEECOM 08/16/18 


Insulin Detemir [Levemir Flextouch] 50 unit SQ BEDTIME 08/16/18 


Isosorbide Mononitrate [Isosorbide Mononitrate ER] 30 mg PO DAILY 08/16/18 


Simvastatin 80 mg PO BEDTIME 08/16/18 


Cholecalciferol (Vitamin D3) [Vitamin D3] 1 cap PO DAILY 10/04/20 


Famotidine [Pepcid] 40 mg OP DAILY 10/04/20 


Furosemide [Lasix*] 40 mg PO DAILY 10/04/20 


Metoprolol Tartrate [Lopressor*] 25 mg PO DAILY 10/04/20 


Nitroglycerin [Nitrostat*] 0.4 mg SL PRN PRN 10/04/20 


Aspirin [Ecotrin 81 MG] 81 mg PO DAILY #30 tablet. 10/06/20 


Atorvastatin Calcium [Lipitor] 40 mg PO BEDTIME #30 tab 10/06/20 


Clopidogrel Bisulfate [Plavix] 75 mg PO DAILY #30 tablet 10/06/20 








- Past Medical/Surgical History


Diabetic: Yes


-: HLD


-: HTN


-: IDDM


-: CAD


-: depression


-: MI


-: stage 3 renal disease


-: hysterectomy


-: cholecystectomy


-: appendectomy


-: lens implant on right eye


-: bilateral cataracts


Psychosocial/ Personal History: Patient currently lives at home with her family.





- Family History


  ** Mother


-: Cancer


Notes: pancreatic cancer





- Social History


Smoking Status: Never smoker


Alcohol use: No


CD- Drugs: No


Caffeine use: Yes


Place of Residence: Home





Review of Systems


10-point ROS is otherwise unremarkable





Physical Examination





- Physical Exam


General: Alert, In no apparent distress, Oriented x3, Cooperative, Obese


HEENT: Atraumatic, Normocephalic, PERRLA


Neck: Supple, 2+ carotid pulse no bruit, JVD not distended


Respiratory: Diminished, Crackles/rales


Cardiovascular: Normal pulses, Regular rate/rhythm, Normal S1 S2, Edema (trace 1

 + b/l)


Capillary refill: <2 Seconds


Gastrointestinal: Normal bowel sounds, Soft and benign, Non-distended


Musculoskeletal: No clubbing, No swelling


Neurological: Normal speech, Normal strength at 5/5 x4 extr, Sensation intact, 

Cranial nerves 3-12 intact





- Studies


Laboratory Data (last 24 hrs)





03/21/22 10:18: WBC 2.50 L, Hgb 9.6 L, Hct 28.9 L, Plt Count 77 L


03/21/22 10:18: Sodium 134 L, Potassium 5.0, BUN 44 H, Creatinine 1.88 H, Gluc

ose 129 H








Assessment and Plan





- Plan


Chest painatypical


CHF exacerbationpresumed diastolic


Hypertension


Diabetes mellitus


HLD


History of CAD


Acute kidney injury on baseline CKDbaseline creatinine of 1.7


Intermittent atrial fibrillation





Plan


We will admit patient to observation


We will do serial set of cardiac enzymes


Given mild persistent chest pain, will start nitro patch and monitor


We will also dose PPI


We will consult cardiology given extensive cardiac history


Admit to telemetry monitor


Continue home regimen, of reduced isosorbide and Toprol dose given borderline 

low blood pressure now


Start Lasix IV 40 mg every 12


Follow daily weight


Monitor renal function


Nephrology team to follow


Need for DVT prophylaxis with subcutaneous Lovenox discussed


Continue aspirin and plan


Given her overall age, we defer starting anticoagulation to cardiology at this 

time


Advanced directivediscussed patient does not want long-term dependence on vent 

but agreeable to trial full code





- Advance Directives


Does patient have a Living Will: Yes


Does patient have a Durable POA for Healthcare: Yes


Physician Review: Patient Assessed, Agree with Above Assessment and Plan


Time Spent Managing Pts Care (In Minutes): 70

## 2022-03-21 NOTE — EDPHYS
Physician Documentation                                                                           

 [facility]                                                                 

Name: Cecilia Lancaster                                                                                  

Age: 86 yrs                                                                                       

Sex: Female                                                                                       

: 1935                                                                                   

MRN: [id number]                                                                                   

Arrival Date: 2022                                                                          

Time: 09:37                                                                                       

Account#: [id number]                                                                            

Bed 25                                                                                            

Private MD:                                                                                       

ED Physician Prateek Simental                                                                       

HPI:                                                                                              

                                                                                             

10:22 This 86 yrs old  Female presents to ER via Wheelchair with complaints of Chest  kb  

      Tightness, Breathing Difficulty.                                                            

10:22 The patient or guardian reports chest pain that is located primarily in the substernal  kb  

      area. Onset: yesterday. The pain does not radiate. Associated signs and symptoms:           

      Pertinent positives: shortness of breath, weakness. The chest pain is described as          

      aching. Duration: The patient or guardian reports a single episode, that is still           

      ongoing. Modifying factors: The symptoms are alleviated by nothing. the symptoms are        

      aggravated by nothing. Severity of pain: At its worst the pain was moderate in the          

      emergency department the pain is unchanged. The patient has not experienced similar         

      symptoms in the past. The patient has not recently seen a physician. Daughter states pt     

      was having weakness and felt like she was going to pass out on Saturday. States she         

      started feeling a little better, but  was getting short of breath after walking       

      short distances. Chest pain began after that. .                                             

                                                                                                  

Historical:                                                                                       

- Allergies:                                                                                      

09:48 NKA;                                                                                    aa5 

- PMHx:                                                                                           

09:48 Depression; Diabetes - IDDM; Hyperlipidemia; Hypertension; stage 3 renal disease;       aa5 

09:48 Myocardial infarction;                                                                  aa5 

- PSHx:                                                                                           

09:48 CABG;                                                                                   aa5 

                                                                                                  

- Immunization history:: Client reports receiving the 2nd dose of the Covid vaccine,              

  Flu vaccine is up to date.                                                                      

- Social history:: Smoking status: Patient denies any tobacco usage or history of.                

                                                                                                  

                                                                                                  

ROS:                                                                                              

10:21 Constitutional: Negative for fever, chills, and weight loss.                            kb  

10:21 Cardiovascular: Positive for chest pain, Negative for edema, orthopnea, palpitations,       

      paroxysmal nocturnal dyspnea.                                                               

10:21 Respiratory: Positive for dyspnea on exertion, shortness of breath, Negative for cough,     

      hemoptysis, orthopnea, pleurisy, sputum production, wheezing.                               

10:21 Neuro: Positive for weakness.                                                               

10:21 All other systems are negative.                                                             

                                                                                                  

Exam:                                                                                             

10:21 Constitutional:  This is a well developed, well nourished patient who is awake, alert,  kb  

      and in no acute distress. Head/Face:  Normocephalic, atraumatic. ENT:  Moist Mucous         

      membranes Cardiovascular:  Regular rate and rhythm with a normal S1 and S2.  No             

      gallops, murmurs, or rubs.  No pulse deficits. Respiratory:  Respirations even and          

      unlabored. No increased work of breathing. Talking in full sentences Abdomen/GI:  Soft,     

      non-tender. No distention Skin:  Warm, dry with normal turgor.  Normal color. MS/           

      Extremity:  Pulses equal, no cyanosis.  Neurovascular intact.  Full, normal range of        

      motion. Neuro:  Awake and alert, GCS 15, oriented to person, place, time, and               

      situation. Moves all extremities. Normal gait. Psych:  Awake, alert, with orientation       

      to person, place and time.  Behavior, mood, and affect are within normal limits.            

10:28 ECG was reviewed by the Attending Physician.                                            kb  

                                                                                                  

Vital Signs:                                                                                      

09:48  / 55; Pulse 50; Resp 18 S; Temp 97.6(TE); Pulse Ox 100% on R/A; Weight 78.47 kg  aa5 

      (R); Height 5 ft. 7 in. (170.18 cm) (R);                                                    

10:49  / 56; Pulse 49; Resp 18 S; Pulse Ox 100% ; Pain 0/10;                            ab2 

11:34  / 55; Pulse 52; Resp 18; Pulse Ox 97% on R/A;                                    ab2 

12:14  / 55; Pulse 54; Resp 16; Temp 97.9(O); Pulse Ox 100% on R/A; Pain 0/10;          ab2 

13:06  / 51; Pulse 54; Resp 17; Pulse Ox 98% on R/A;                                    ab2 

14:39  / 76; Pulse 50; Resp 17; Pulse Ox 98% on R/A; Pain 0/10;                         ab2 

15:10  / 56; Pulse 72; Resp 17; Pulse Ox 98% on R/A;                                    ab2 

15:50  / 65; Pulse 73; Resp 16; Pulse Ox 98% on R/A;                                    ab2 

16:35  / 62; Pulse 54; Resp 16; Pulse Ox 100% ; Pain 0/10;                              ab2 

17:16  / 72; Pulse 50; Resp 16; Pulse Ox 98% ;                                          ab2 

09:48 Body Mass Index 27.10 (78.47 kg, 170.18 cm)                                             aa5 

                                                                                                  

MDM:                                                                                              

10:15 Patient medically screened.                                                             kb  

10:17 Data reviewed: vital signs, nurses notes. Data interpreted: Pulse oximetry: on room air kb  

      is 100 %. Interpretation: normal.                                                           

12:07 Counseling: I had a detailed discussion with the patient and/or guardian regarding: the kb  

      historical points, exam findings, and any diagnostic results supporting the                 

      discharge/admit diagnosis, lab results, radiology results, the need for further work-up     

      and treatment in the hospital. Physician consultation: Nayana Reyna MD was contacted      

      at 12:08, regarding admission, patient's condition.                                         

12:09 The patient was given aspirin in the Emergency Department.                              kb  

                                                                                                  

                                                                                             

10:16 Order name: Basic Metabolic Panel; Complete Time: 10:53                                 kb  

                                                                                             

10:16 Order name: CBC with Diff; Complete Time: 10:59                                         kb  

                                                                                             

10:16 Order name: NT PRO-BNP; Complete Time: 10:53                                            kb  

                                                                                             

10:16 Order name: Troponin HS; Complete Time: 10:53                                           kb  

                                                                                             

10:29 Order name: COVID-19 SARS RT PCR (Document "Date of Onset" if Symptomatic); Complete    kb  

      Time: 12:07                                                                                 

                                                                                             

10:58 Order name: CBC Smear Scan; Complete Time: 10:59                                        EDMS

                                                                                             

14:23 Order name: Thyroid Stimulating Hormone                                                 EDMS

                                                                                             

14:23 Order name: CBC with Automated Diff                                                     EDMS

                                                                                             

14:23 Order name: CBC with Automated Diff                                                     EDMS

                                                                                             

14:23 Order name: Comprehensive Metabolic Panel                                               EDMS

                                                                                             

14:23 Order name: Comprehensive Metabolic Panel                                               EDMS

                                                                                             

14:23 Order name: Lipid Profile                                                               EDMS

                                                                                             

14:23 Order name: Lipid Profile                                                               EDMS

                                                                                             

10:16 Order name: XRAY Chest (1 view); Complete Time: 11:37                                   kb  

                                                                                             

10:16 Order name: EKG; Complete Time: 10:16                                                   kb  

                                                                                             

14:23 Order name: CONS Physician Consult                                                      EDMS

                                                                                             

14:23 Order name: CONS Physician Consult                                                      EDMS

                                                                                             

14:23 Order name: Magnesium                                                                   EDMS

                                                                                             

14:23 Order name: Magnesium                                                                   EDMS

03/21                                                                                             

14:23 Order name: Magnesium                                                                   EDMS

                                                                                             

14:23 Order name: Magnesium                                                                   EDMS

                                                                                             

14:23 Order name: Troponin High Sensitivity                                                   EDMS

                                                                                             

14:23 Order name: Troponin High Sensitivity                                                   EDMS

                                                                                             

14:23 Order name: Troponin High Sensitivity                                                   EDMS

                                                                                             

19:06 Order name: Glucose, Ancillary Testing; Complete Time: 19:08                            EDMS

                                                                                             

21:53 Order name: Glucose, Ancillary Testing                                                  EDMS

                                                                                             

10:16 Order name: Cardiac monitoring; Complete Time: 10:18                                    kb  

                                                                                             

10:16 Order name: EKG - Nurse/Tech; Complete Time: 10:18                                      kb  

                                                                                             

10:16 Order name: IV Saline Lock; Complete Time: 10:39                                        kb  

                                                                                             

10:16 Order name: Labs collected and sent; Complete Time: 10:30                               kb  

                                                                                             

10:16 Order name: O2 Per Protocol; Complete Time: 10:18                                       kb  

                                                                                             

10:16 Order name: O2 Sat Monitoring; Complete Time: 10:18                                     kb  

                                                                                             

14:23 Order name: 60g Consistent Carbohydrate (ADA 1800/2000)                                 EDMS

                                                                                                  

ECG:                                                                                              

10:28 Rate is 50 beats/min. Rhythm is irregularly irregular. QRS Axis is Normal. QRS interval kb  

      is normal at 136 msec. QT interval is normal at 479 msec.                                   

                                                                                                  

Administered Medications:                                                                         

10:31 Drug: Aspirin Chewable Tablet 324 mg Route: PO;                                         ab2 

10:52 Follow up: Response: No adverse reaction                                                ab2 

12:28 Drug: Lasix (furosemide) 20 mg Route: IVP; Site: left forearm;                          ab2 

12:37 Follow up: Response: No adverse reaction                                                ab2 

                                                                                                  

                                                                                                  

Disposition:                                                                                      

19:08 Co-signature as Attending Physician, Prateek Simental MD I agree with the assessment and   kdr 

      plan of care.                                                                               

                                                                                                  

Disposition Summary:                                                                              

22 12:10                                                                                    

Hospitalization Ordered                                                                           

      Hospitalization Status: Observation                                                     kb  

      Provider: Nayana Reyna                                                               kb  

      Condition: Stable                                                                       kb  

      Problem: new                                                                            kb  

      Symptoms: are unchanged                                                                 kb  

      Bed/Room Type: Standard                                                                 kb  

      Location: Telemetry/MedSurg (observation)(22 00:10)                               cg  

      Room Assignment: [facility](22 00:10)                                                    cg  

      Diagnosis                                                                                   

        - Chest pain, unspecified                                                             kb  

        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  

      Forms:                                                                                      

        - Medication Reconciliation Form                                                      kb  

        - SBAR form                                                                           kb  

Signatures:                                                                                       

Dispatcher [employer]                           EDMS                                                 

Edith Scott, FNP-C                 FNP-Ckb                                                   

Prateek Simental MD MD kdr Calderon, Audri RN                     RN   aa5                                                  

Felipa Gautam RN                      RN   Daysi Atnhony, RN                       RN   cg                                                   

Maykel Randall                           ab2                                                  

                                                                                                  

Corrections: (The following items were deleted from the chart)                                    

10:58 10:56 Manual Differential ordered. EDMS                                                 EDMS

: 12:10 Telemetry/MedSurg (observation) kb                                                  

17:03 12:10 kb                                                                                  

17: 17:03 ss                                                                                ss  

                                                                                             

00:10  17:03 [facility] ER HOLD                                                              cg  

                                                                                             

00:10  17:03 ERHOLD-                                                                   cg  

                                                                                                  

**************************************************************************************************

## 2022-03-21 NOTE — ER
Nurse's Notes                                                                                     

 [facility]                                                                 

Name: Cecilia Lancaster                                                                                  

Age: 86 yrs                                                                                       

Sex: Female                                                                                       

: 1935                                                                                   

MRN: [id number]                                                                                   

Arrival Date: 2022                                                                          

Time: 09:37                                                                                       

Account#: [id number]                                                                            

Bed 25                                                                                            

Private MD:                                                                                       

Diagnosis: Chest pain, unspecified;Unspecified combined systolic (congestive) and diastolic       

  (congestive) heart failure                                                                      

                                                                                                  

Presentation:                                                                                     

                                                                                             

09:47 Chief complaint: Pt's daughter states "She felt like she was going to pass out on       aa5 

      Saturday and later that day she started with chest pain". Pt currently c/o chest pain       

      and SOB. Denies cough. Pt reports diarrhea since Thursday, denies vomiting. Coronavirus     

      screen: shortness of breath. Ebola Screen: No symptoms or risks identified at this          

      time. Risk Assessment: Do you want to hurt yourself or someone else? Patient reports no     

      desire to harm self or others. Onset of symptoms was 2022.                            

09:47 Method Of Arrival: Wheelchair                                                           aa5 

09:47 Acuity: RICK 2                                                                           aa5 

09:48 Initial Sepsis Screen: Does the patient meet any 2 criteria? No. Patient's initial      aa5 

      sepsis screen is negative. Does the patient have a suspected source of infection? No.       

      Patient's initial sepsis screen is negative.                                                

                                                                                                  

Historical:                                                                                       

- Allergies:                                                                                      

09:48 NKA;                                                                                    aa5 

- PMHx:                                                                                           

09:48 Depression; Diabetes - IDDM; Hyperlipidemia; Hypertension; stage 3 renal disease;       aa5 

09:48 Myocardial infarction;                                                                  aa5 

- PSHx:                                                                                           

09:48 CABG;                                                                                   aa5 

                                                                                                  

- Immunization history:: Client reports receiving the 2nd dose of the Covid vaccine,              

  Flu vaccine is up to date.                                                                      

- Social history:: Smoking status: Patient denies any tobacco usage or history of.                

                                                                                                  

                                                                                                  

Screening:                                                                                        

10:51 Abuse screen: Denies threats or abuse. Denies injuries from another. Nutritional        ab2 

      screening: No deficits noted. Tuberculosis screening: No symptoms or risk factors           

      identified. Fall Risk None identified.                                                      

                                                                                                  

Assessment:                                                                                       

10:50 General: Appears in no apparent distress. comfortable, Behavior is calm, cooperative,   ab2 

      appropriate for age. Pain: Complains of pain in chest Pain does not radiate. Pain           

      currently is 0 out of 10 on a pain scale. Quality of pain is described as aching, Pain      

      began suddenly, Is intermittent. Neuro: Level of Consciousness is awake, alert, obeys       

      commands, Oriented to person, place, time, situation, Appropriate for age  are         

      equal bilaterally. Cardiovascular: Reports chest pain, shortness of breath, Heart tones     

      S1 S2 present Respiratory: Airway is patent Respiratory effort is even, unlabored,          

      Respiratory pattern is regular, symmetrical, Breath sounds are clear bilaterally. GI:       

      No deficits noted. No signs and/or symptoms were reported involving the                     

      gastrointestinal system. : No deficits noted. No signs and/or symptoms were reported      

      regarding the genitourinary system. EENT: No deficits noted. No signs and/or symptoms       

      were reported regarding the EENT system. Derm: No deficits noted. Skin is intact, Skin      

      is pink, warm \T\ dry. Musculoskeletal: No deficits noted. No signs and/or symptoms         

      reported regarding the musculoskeletal system.                                              

10:52 Reassessment: Patient appears in no apparent distress at this time. Pt resting denies   ab2 

      any needs. Daughter at bedside.                                                             

12:13 Reassessment: Patient appears in no apparent distress at this time. No changes from     ab2 

      previously documented assessment. Patient given ice water and warm blankets. Patient        

      repositioned and made comfortable. Pt to be admitted, awaiting room for admission.          

      Daughter remains at bedside, denies any further needs.                                      

13:06 Reassessment: Patient appears in no apparent distress at this time. No changes from     ab2 

      previously documented assessment. Pt resting comfortably, denies any needs. Daughter        

      and family member at bedside.                                                               

14:39 Reassessment: Pt up to bedside commode. Pt denies any needs. Daughter at bedside.       ab2 

      Awaiting room for admission.                                                                

15:49 Reassessment: Patient appears in no apparent distress at this time. No changes from     ab2 

      previously documented assessment. Pt resting in bed comfortably, denies any needs.          

      Daughter at bedside. Awaiting room for admission.                                           

16:34 Reassessment: Patient appears in no apparent distress at this time. No changes from     ab2 

      previously documented assessment. pt resting in bed, daughter at bedside. denies any        

      needs, awaiting room for admission.                                                         

                                                                                                  

Vital Signs:                                                                                      

09:48  / 55; Pulse 50; Resp 18 S; Temp 97.6(TE); Pulse Ox 100% on R/A; Weight 78.47 kg  aa5 

      (R); Height 5 ft. 7 in. (170.18 cm) (R);                                                    

10:49  / 56; Pulse 49; Resp 18 S; Pulse Ox 100% ; Pain 0/10;                            ab2 

11:34  / 55; Pulse 52; Resp 18; Pulse Ox 97% on R/A;                                    ab2 

12:14  / 55; Pulse 54; Resp 16; Temp 97.9(O); Pulse Ox 100% on R/A; Pain 0/10;          ab2 

13:06  / 51; Pulse 54; Resp 17; Pulse Ox 98% on R/A;                                    ab2 

14:39  / 76; Pulse 50; Resp 17; Pulse Ox 98% on R/A; Pain 0/10;                         ab2 

15:10  / 56; Pulse 72; Resp 17; Pulse Ox 98% on R/A;                                    ab2 

15:50  / 65; Pulse 73; Resp 16; Pulse Ox 98% on R/A;                                    ab2 

16:35  / 62; Pulse 54; Resp 16; Pulse Ox 100% ; Pain 0/10;                              ab2 

17:16  / 72; Pulse 50; Resp 16; Pulse Ox 98% ;                                          ab2 

09:48 Body Mass Index 27.10 (78.47 kg, 170.18 cm)                                             aa5 

                                                                                                  

ED Course:                                                                                        

09:37 Patient arrived in ED.                                                                  kz  

09:48 Triage completed.                                                                       aa5 

09:48 Arm band placed on.                                                                     aa5 

09:55 EKG completed in triage. Results shown to MD.                                           aa5 

10:02 [facility]                                                        kb  

10:02 Prtaeek Simental MD is Attending Physician.                                              kb  

10:16 Maykel Randall is Primary Nurse.                                                    ab2 

10:35 COVID-19 SARS RT PCR (Document "Date of Onset" if Symptomatic) Sent.                    ab2 

10:45 XRAY Chest (1 view) In Process Unspecified.                                             EDMS

10:52 Patient has correct armband on for positive identification. Bed in low position. Call   ab2 

      light in reach. Side rails up X2. Adult w/ patient. Cardiac monitor on. Pulse ox on.        

      NIBP on.                                                                                    

10:52 No provider procedures requiring assistance completed. Inserted saline lock: 22 gauge   ab2 

      in left wrist, using aseptic technique. Patient maintains SpO2 saturation greater than      

      95% on room air.                                                                            

12:10 Nayana Reyna MD is Hospitalizing Provider.                                          kb  

03/22                                                                                             

00:57 Patient admitted, IV remains in place.                                                  sm5 

                                                                                                  

Administered Medications:                                                                         

                                                                                             

10:31 Drug: Aspirin Chewable Tablet 324 mg Route: PO;                                         ab2 

10:52 Follow up: Response: No adverse reaction                                                ab2 

12:28 Drug: Lasix (furosemide) 20 mg Route: IVP; Site: left forearm;                          ab2 

12:37 Follow up: Response: No adverse reaction                                                ab2 

                                                                                                  

                                                                                                  

Outcome:                                                                                          

12:10 Decision to Hospitalize by Provider.                                                    kb  

                                                                                             

00:57 Admitted to Med/surg accompanied by tech, family with patient, via stretcher, with      5 

      chart.                                                                                      

      Condition: stable                                                                           

      Instructed on the need for admit.                                                           

00:58 Patient left the ED.                                                                    5 

                                                                                                  

Signatures:                                                                                       

Dispatcher [employer]                           EDMS                                                 

Edith Scott, MICHAEL EVANS-Soraida Kumar, RN                     RN   aa5                                                  

Rani Valiente RN                        RN   sm5                                                  

Maykel Randall2                                                  

Aspen Magallon                                                   

                                                                                                  

Corrections: (The following items were deleted from the chart)                                    

                                                                                             

09:54 09:47 Acuity: RICK 3 aa5                                                                 aa5 

                                                                                                  

**************************************************************************************************

## 2022-03-21 NOTE — RAD REPORT
EXAM DESCRIPTION:  [place] Single View3/21/2022 10:46 am

 

CLINICAL HISTORY:  Chest pain

 

COMPARISON:  2021

 

FINDINGS:   Mild bilateral pulmonary opacities.

 

Cardiomegaly.

 

Post surgical changes involve the chest.

 

IMPRESSION:  Mild bilateral pulmonary opacities may indicate mild CHF

## 2022-03-22 VITALS — OXYGEN SATURATION: 98 %

## 2022-03-22 VITALS — SYSTOLIC BLOOD PRESSURE: 133 MMHG | DIASTOLIC BLOOD PRESSURE: 42 MMHG | TEMPERATURE: 97.6 F

## 2022-03-22 LAB
ALBUMIN SERPL BCP-MCNC: 3.7 G/DL (ref 3.4–5)
ALP SERPL-CCNC: 79 U/L (ref 45–117)
ALT SERPL W P-5'-P-CCNC: 13 U/L (ref 12–78)
AST SERPL W P-5'-P-CCNC: 17 U/L (ref 15–37)
BUN BLD-MCNC: 42 MG/DL (ref 7–18)
GLUCOSE SERPLBLD-MCNC: 144 MG/DL (ref 74–106)
HCT VFR BLD CALC: 29 % (ref 36–45)
HDLC SERPL-MCNC: 30 MG/DL (ref 40–60)
LDLC SERPL CALC-MCNC: 68 MG/DL (ref ?–130)
LYMPHOCYTES # SPEC AUTO: 0.6 K/UL (ref 0.7–4.9)
PMV BLD: 11 FL (ref 7.6–11.3)
POTASSIUM SERPL-SCNC: 4.8 MMOL/L (ref 3.5–5.1)
RBC # BLD: 2.99 M/UL (ref 3.86–4.86)

## 2022-03-22 RX ADMIN — METOPROLOL TARTRATE SCH MG: 25 TABLET ORAL at 06:42

## 2022-03-22 RX ADMIN — ALBUMIN (HUMAN) SCH MG: 5 SOLUTION INTRAVENOUS at 04:23

## 2022-03-22 RX ADMIN — Medication SCH ML: at 09:00

## 2022-03-22 RX ADMIN — APIXABAN SCH MG: 2.5 TABLET, FILM COATED ORAL at 09:52

## 2022-03-22 RX ADMIN — HUMAN INSULIN SCH: 100 INJECTION, SOLUTION SUBCUTANEOUS at 07:30

## 2022-03-22 RX ADMIN — FAMOTIDINE SCH MG: 20 TABLET, FILM COATED ORAL at 09:52

## 2022-03-22 NOTE — P.CNS
Date of Consult: 03/22/22


Chief Complaint: Chest pain shortness of breath


Allergies





No Known Allergies Allergy (Verified 04/01/21 11:20)


   





Home Medications: 








Citalopram Hydrobromide [Citalopram HBr] 10 mg PO BEDTIME 08/16/18 


Insulin Aspart [Novolog Flexpen] 20 units SQ SEECOM 08/16/18 


Insulin Detemir [Levemir Flextouch] 50 unit SQ BEDTIME 08/16/18 


Isosorbide Mononitrate [Isosorbide Mononitrate ER] 30 mg PO DAILY 08/16/18 


Cholecalciferol (Vitamin D3) [Vitamin D3] 1 cap PO DAILY 10/04/20 


Famotidine [Pepcid] 40 mg OP DAILY 10/04/20 


Metoprolol Tartrate [Lopressor*] 25 mg PO BID* 10/04/20 


Aspirin [Ecotrin 81 MG] 81 mg PO DAILY #30 tablet. 10/06/20 


Atorvastatin Calcium [Lipitor] 40 mg PO BEDTIME #30 tab 10/06/20 


Clopidogrel Bisulfate [Plavix*] 75 mg PO DAILY #30 tablet 10/06/20 


Ferrous Sulfate [Ferrous Sulfate*] 1 tab PO DAILY 03/22/22 








- Past Medical/Surgical History


Diabetic: Yes


-: HLD


-: HTN


-: IDDM


-: CAD


-: depression


-: MI


-: stage 3 renal disease


-: hysterectomy


-: cholecystectomy


-: appendectomy


-: lens implant on right eye


-: bilateral cataracts


Psychosocial/ Personal History: Patient currently lives at home with her family.





- Family History


  ** Mother


Medical History: Cancer


Notes: pancreatic cancer





- Social History


Alcohol use: No


CD- Drugs: No


Caffeine use: No


Place of Residence: Home





Physical Examination














Temp Pulse Resp BP Pulse Ox


 


 98.7 F   49 L  16   165/63 H  97 


 


 03/22/22 04:00  03/22/22 06:42  03/22/22 04:00  03/22/22 06:42  03/22/22 04:00








Laboratory Data (last 24 hrs)





03/21/22 10:18: WBC 2.50 L, Hgb 9.6 L, Hct 28.9 L, Plt Count 77 L


03/21/22 10:18: Sodium 134 L, Potassium 5.0, BUN 44 H, Creatinine 1.88 H, 

Glucose 129 H

## 2022-03-22 NOTE — EKG
Test Date:    2022-03-21               Test Time:    09:54:39

Technician:   VIKI                                    

                                                     

MEASUREMENT RESULTS:                                       

Intervals:                                           

Rate:         50                                     

KS:                                                  

QRSD:         136                                    

QT:           526                                    

QTc:          479                                    

Axis:                                                

P:                                                   

KS:                                                  

QRS:          66                                     

T:            66                                     

                                                     

INTERPRETIVE STATEMENTS:                                       

                                                     

Atrial fibrillation with slow ventricular response

Right bundle branch block

Abnormal ECG

Compared to ECG 04/01/2021 10:43:29

Sinus bradycardia no longer present



Electronically Signed On 03-22-22 08:42:32 CDT by James Velasquez

## 2022-03-24 NOTE — CON
Date of Consultation:  03/22/2022



Reason For Consultation:  Chest pain.



History Of Present Illness:  Ms. Lancaster is 86.  She is known to me from previous office visit admission
s.  She has a history of coronary artery disease.  She is status post CABG.  Catheterization a year a
go showed a patent LIMA to the LAD, the patent vein graft to the RCA with some diffuse disease in the
 distal RCA, PDA and posterolateral.  Circumflex was okay.  She was treated medically then.  Comes in
 with substernal chest pressure.  No nausea, vomiting, diaphoresis, PND, orthopnea, pedal edema, palp
itations, or syncope.  She ruled out for an MI already.  She is pain-free today.



Past Medical History:  Includes paroxysmal atrial fibrillation on Eliquis, dyslipidemia on Lipitor, c
hronic diastolic congestive heart failure on Lasix, diabetes on insulin, hypertension, she takes meto
prolol 25 b.i.d.



Medications:  As listed earlier.  She also takes Plavix and Imdur in addition to mentioned earlier.



Allergies:  NONE.



Review of Systems:

Negative.



Social History:  Negative.



Family History:  Negative.



Physical Examination:

Vital Signs:  Stable.  She was afebrile. 

HEENT:  Negative. 

Neck:  Supple.  No bruit. 

Chest:  Clear. 

Cardiac:  Revealed a regular rhythm and rate with an S4 gallop. 

Abdomen:  Benign. 

Extremities:  Revealed no clubbing, cyanosis, or edema.



Diagnostic Data:  Include a creatinine 1.73.  Hemoglobin 9.6.  BNP is 240.  Troponin was negative.  E
KG was negative.  Chest x-ray was negative.



Impression And Plan:  Chest pain, probably secondary to her distal RCA disease in the PDA and postero
lateral.  I suggest we increase her metoprolol to 25 b.i.d.  I think we should increase her Imdur to 
60 mg daily and continue her Eliquis, Lipitor, Lasix, and insulin.  She can go home otherwise.  If he
r symptoms persist, I would do another catheterization on her.  I am going to have her do another [place]
iscan before that.  Her other problems include paroxysmal atrial fibrillation.  She is in sinus rhyth
m.  She is on Eliquis and metoprolol.  She also has dyslipidemia on Lipitor, chronic diastolic conges
tive heart failure on Lasix, diabetes on insulin, which is well controlled.  She has mild anemia.  Sh
e has chronic renal disease stage 3.  I think avoiding angiogram in that case would be good.  We will
 continue to follow her.  She can go home with above instructions.  I will see her in the office in t
he next week or 2.





MACIE

DD:  03/24/2022 09:19:37Voice ID:  [id number]

DT:  03/24/2022 13:21:00Report ID:  [id number]

## 2023-01-10 ENCOUNTER — HOSPITAL ENCOUNTER (EMERGENCY)
Dept: HOSPITAL 97 - ER | Age: 88
Discharge: HOME | End: 2023-01-10
Payer: COMMERCIAL

## 2023-01-10 VITALS — TEMPERATURE: 97.7 F | DIASTOLIC BLOOD PRESSURE: 62 MMHG | OXYGEN SATURATION: 97 % | SYSTOLIC BLOOD PRESSURE: 149 MMHG

## 2023-01-10 DIAGNOSIS — Z95.1: ICD-10-CM

## 2023-01-10 DIAGNOSIS — S32.89XA: Primary | ICD-10-CM

## 2023-01-10 DIAGNOSIS — E11.22: ICD-10-CM

## 2023-01-10 DIAGNOSIS — N18.30: ICD-10-CM

## 2023-01-10 DIAGNOSIS — I12.9: ICD-10-CM

## 2023-01-10 LAB
ALBUMIN SERPL BCP-MCNC: 3.4 G/DL (ref 3.4–5)
ALP SERPL-CCNC: 78 U/L (ref 45–117)
ALT SERPL W P-5'-P-CCNC: 14 U/L (ref 13–56)
AST SERPL W P-5'-P-CCNC: 19 U/L (ref 15–37)
BUN BLD-MCNC: 37 MG/DL (ref 7–18)
GLUCOSE SERPLBLD-MCNC: 223 MG/DL (ref 74–106)
HCT VFR BLD CALC: 28.9 % (ref 36–45)
LYMPHOCYTES # SPEC AUTO: 1.1 K/UL (ref 0.7–4.9)
MCV RBC: 96.8 FL (ref 80–100)
PMV BLD: 8.2 FL (ref 7.6–11.3)
POTASSIUM SERPL-SCNC: 4.3 MMOL/L (ref 3.5–5.1)
RBC # BLD: 2.98 M/UL (ref 3.86–4.86)

## 2023-01-10 PROCEDURE — 81015 MICROSCOPIC EXAM OF URINE: CPT

## 2023-01-10 PROCEDURE — 36415 COLL VENOUS BLD VENIPUNCTURE: CPT

## 2023-01-10 PROCEDURE — 99284 EMERGENCY DEPT VISIT MOD MDM: CPT

## 2023-01-10 PROCEDURE — 96374 THER/PROPH/DIAG INJ IV PUSH: CPT

## 2023-01-10 PROCEDURE — 80053 COMPREHEN METABOLIC PANEL: CPT

## 2023-01-10 PROCEDURE — 85025 COMPLETE CBC W/AUTO DIFF WBC: CPT

## 2023-01-10 PROCEDURE — 81003 URINALYSIS AUTO W/O SCOPE: CPT

## 2023-01-10 PROCEDURE — 74176 CT ABD & PELVIS W/O CONTRAST: CPT

## 2023-01-10 NOTE — XMS REPORT
Continuity of Care Document

                           Created on:January 10, 2023



Patient:KELLY SCRUGGS

Sex:Female

:1935

External Reference #:[id number]





Demographics







                          Address                   [address] [place] [address]

 

                          Home Phone                [phone]

 

                          Work Phone                [phone]

 

                          Mobile Phone              [phone]

 

                          Email Address             [email or web address]

 

                          Preferred Language        spa

 

                          Marital Status            Unknown

 

                          Denominational Affiliation     Unknown

 

                          Race                      Unknown

 

                          Additional Race(s)        Unavailable



                                                    Unavailable

 

                          Ethnic Group              Unknown









Author







                          Organization              [facility]

t

 

                          Address                   [address]



                                                    [place] [address]

 

                          Phone                     [phone]









Support







                Name            Relationship    Address         Phone

 

                LISETTE BOWDEN     Unavailable     [address] [place], [place] [phone]



                                                [place] [address] 

 

                WARNICK, GRISELDA Unavailable     .               [phone]



                                                [place] [address] 

 

                Kelly Scruggs     Unavailable     [address] [place] [address] [phone]



                                                [place] [address] 

 

                Tyra bowden     Unavailable     Unavailable     [phone]









Care Team Providers







                    Name                Role                Phone

 

                    Yasmin Lopez          Attending Clinician Unavailable

 

                    CHAIM SANTIZO      Attending Clinician Unavailable

 

                    AJAY BERNARDO    Attending Clinician Unavailable

 

                    Chaim Santizo      Attending Clinician Unavailable

 

                    CHAIM SANTIZO      Admitting Clinician Unavailable

 

                    AJAY BERNARDO    Admitting Clinician Unavailable









Payers







           Payer Name Policy Type Policy Number Effective Date Expiration Date S

eliot

 

           MEDICARE A B            [id number] 2000-10-01            



                                            00:00:00              

 

           MEDICAID OF            [id number]                        



           [facility]         [id number]  2021            [facility]

t



           (NON-PAR)                        00:00:00              - [facility]

 

           MEDICARE   MB         [id number] 2000-10-01            [facility]



           NOVITAS                          00:00:00              - [facility]

 

           MEDICARE   MB         [id number] 2000-10-01            [facility]



           NOVITAS                          00:00:00              - [facility]         [id number]                        [facility]



                                                                  - [facility]

 

           MEDICARE   MB         [id number] 2000-10-01            [facility]



           NOVITAS                          00:00:00              [place] [address]         [id number]  2012            [facility]



           (NON-PAR)                        00:00:00              - [facility]







Problems







       Condition Condition Condition Status Onset  Resolution Last   Treating Co

mments 

Source



       Name   Details Category        Date   Date   Treatment Clinician        



                                                 Date                 

 

       Osteoarthr DJD    Problem                                           Commo

n



       itis   (degenerat                                                  [facility]



              sukhwinder joint                                                  - [facility]



              disease),                                                  [facility]

 

       Stable Stable Problem                                           Common



       angina angina                                                  [facility]



                                                                      - [facility]

 

       Degenerati DDD    Problem                                           Commo

n



       on of  (degenerat                                                  [facility]



       lumbosacra sukhwinder disc                                                  - [facility]

I



       l      disease),                                                  



       interverte lumbosacra                                                  [facility]

kes



       bral disc l                                                       Medical



                                                                      Center

 

       [id number] S/P    Problem                                           Common



              coronary                                                  [facility]



              artery                                                  - [facility]



              stent                                                   



              placement                                                  [facility]

 

       [id number] Diastolic Problem                                           Com

mon



              heart                                                   [facility]



              failure                                                  - [facility]



              with                                                    [facility]



              ejection                                                  Medical



              fraction                                                  Center

 

       [id number] Incomplete Problem                                           Co

mmon



              emptying                                                  [facility]



              of bladder                                                  - [facility]

 

       [id number] +5th digit Problem                                           Co

mmon



              eff                                                     [facility]



              10/1/20*CK                                                  - [facility]



              D (chronic                                                  



              kidney                                                  [facility]



              disease)                                                  Medical



              stage 3,                                                  Center



              GFR 30-59                                                  



              ml/min                                                  

 

       [id number] Atheroscle Problem                                           Co

mmon



              rotic                                                   [facility]



              heart                                                   - [facility]



              disease of                                                  [facility]



              coronary                                                  Medical



              artery                                                  Center



              without                                                  



              angina                                                  



              pectoris                                                  

 

       [id number] Urinary Problem                                           Common



              tract                                                   [facility]



              infection                                                  - [facility]



              without                                                  St



              hematuria,                                                  [facility]                                                    Medical



              unspecifie                                                  Center



              d                                                       

 

       Mixed  Depression Problem                                           Commo

n



       anxiety with                                                    [facility]



       and    anxiety                                                  - [facility]



       depressive                                                         [facility]

 

       Arthritis Arthritis Problem                                           Com

mon



                                                                      [facility]



                                                                      - [facility]

 

       [id number] Acute on Problem                                           Comm

on



              chronic                                                  [facility]



              diastolic                                                  - [facility]



              heart                                                   [facility]

 

       Thrombocyt Thrombocyt Problem                                           C

ommon



       openia openia                                                  [facility]

 

       [id number] Lower  Problem                                           Common



              urinary                                                  [facility]



              tract                                                   - [facility]



              symptoms                                                  St



              (LUTS)                                                  [facility]

 

       [id number] Recurrent Problem                                           Com

mon



              UTI                                                     [facility]



                                                                      - [facility]

 

       Essential Benign Problem                                           Common



       hypertensi essential                                                  Spi

rit



       on     HTN                                                     - [facility]

 

       [id number] Cystitis Problem                                           Commo

n



              cystica                                                  [facility]

 

       [id number] History of Problem                                           Co

mmon



              bladder                                                  [facility]



              cancer                                                  - [facility]

 

       Post   Presence Problem                                           Common



       percutaneo of                                                      [facility]



       us     coronary                                                  - [facility]



       translumin angioplast                                                  St



       al     y implant                                                  [facility]



       coronary and graft                                                  Medic

al



       angioplast                                                         Center



       y                                                              

 

       Exacerbati CHF    Problem                                           Commo

n



       on of  exacerbati                                                  [facility]



       congestive on                                                      - [facility]



       heart                                                          [facility]

 

       Disorder Other  Problem                                           Common



       of lung alveolar                                                  [facility]



              and                                                     - [facility]



              parieto-al                                                  



              veolar                                                  [facility]



              conditions                                                  Medica

[facility]

 

       [id number] Allergic Problem                                           Commo

n



              rhinitis,                                                  [facility]



              unspecifie                                                  - [facility]



              d                                                       



              seasonalit                                                  [facility]



              y,                                                      Medical



              unspecifie                                                  Center



              d trigger                                                  

 

       [id number] Thrombocyt Problem                                           Co

mmon



              openia,                                                  [facility]



              unspecifie                                                  - [facility]



              d                                                       [facility]

 

       [id number] Decreased Problem                                           Com

mon



              hearing,                                                  [facility]



              unspecifie                                                  - [facility]



              d                                                       [facility]

 

       Localized, Primary Problem                                           Comm

on



       primary osteoarthr                                                  [facility]

t



       osteoarthr itis of                                                  - [facility]



       itis of both hips                                                  St



       the pelvic                                                         [facility]



       region and                                                         Medica

l



       thigh                                                          Center

 

       [id number] Hyperglyce Problem                                           Com

mon



              rosmery                                                     [facility]irit



                                                                      [facility]

 

       Mixed  Mixed  Problem                                           Common



       hyperlipid hyperlipid                                                  Sp

jose miguel



       emia   emia                                                    [facility]

 

       [id number] Posterior Problem                                           Com

mon



              auricular                                                  [facility]



              lymphadeno                                                  - [facility]



              phan                                                   [facility]

 

       Atheroscle Arterioscl Problem                                           C

ommon



       rotic  erosis of                                                  [facility]



       heart  coronary                                                  - [facility]



       disease of artery                                                  [facility]



       coronary                                                         Medical



       artery                                                         Center



       without                                                         



       angina                                                         



       pectoris                                                         

 

       Fatty  Fatty  Problem                                           Common



       liver  liver                                                   [facility]

 

       Diverticul Diverticul Problem                                           C

ommon



       osis of osis of                                                  [facility]



       colon  colon                                                   - [facility]

 

       [id number] Right  Problem                                           Common



              upper                                                   [facility]



              quadrant                                                  - [facility]



              abdominal                                                  [facility]

 

       Anemia of Anemia in Problem                                           Com

mon



       chronic chronic                                                  [facility]



       disorder illness                                                  - [facility]

 

       [id number] Coronary Problem                                           Comm

on



              artery                                                  [facility]



              disease of                                                  [facility]



              native                                                  St



              heart with                                                  [facility]



              stable                                                  Medical



              angina                                                  Center



              pectoris,                                                  



              unspecifie                                                  



              d vessel                                                  



              or lesion                                                  



              type                                                    

 

       [id number] Type 2 Problem                                           Common



              diabetes                                                  [facility]



              mellitus                                                  - [facility]



              without                                                  St



              complicati                                                  [facility]

 

       [id number] Carpal Problem                                           Commo

n



       14052  tunnel                                                  [facility]



              syndrome,                                                  - [facility]



              left                                                    [facility]

 

       Trigger Trigger Problem                                           Common



       finger finger                                                  [facility]

 

       Chronic Chronic Problem                                           Common



       renal  renal                                                   [facility]



       disease disease                                                  - [facility]

 

       [id number] Long term Problem                                           Com

mon



              current                                                  [facility]irit



              use of                                                  [facility]



              insulin                                                  [facility]

 

       Unsteady Unsteady Problem                                           Commo

n



       gait   gait                                                    [facility]

 

       Seasonal Seasonal Problem                                           Commo

n



       allergy allergies                                                  [facility]

 

       [id number] Leukocytop Problem                                           Com

mon



              enia,                                                   [facility]



              unspecifie                                                  - [facility]



              d                                                       [facility]

 

       Diabetes Diabetes Problem                                           Commo

n



       mellitus DMII                                                    [facility]



       without without                                                  - [facility]



       complicati complicati                                                  St



       on     [facility]

 

       [id number] Malignant Problem                                           Com

mon



              neoplasm                                                  [facility]



              of urinary                                                  - [facility]



              [facility]adder[facility]                                                  [facility]

 

       Vitamin Vitamin Problem                                           Common



       B12    B12                                                     [facility]



       deficiency deficiency                                                  - 

[facility]

 

       Chronic Chronic Problem                                           Common



       kidney kidney                                                  [facility]



       disease disease,                                                  - [facility]



       stage 4 stage 4                                                  St



              (severe)                                                  [facility]

 

       Gastroesop GERD   Problem                                           Commo

n



       hageal (gastroeso                                                  [facility]



       reflux phageal                                                  - [facility]



       disease reflux                                                  St



              disease)                                                  [facility]

 

       Diabetic Type 2 Problem                                           Common



       renal  diabetes                                                  [facility]



       disease mellitus                                                  - [facility]



              with                                                    



              diabetic                                                  [facility]



              kidney                                                  Center



              disease                                                  

 

       Osteoporos Osteoporos Problem                                           C

ommon



       is     is                                                      [facility]



                                                                      [facility]

 

       [id number] Chronic Problem                                           Common



              fatigue                                                  [facility]







Allergies, Adverse Reactions, Alerts







       Allergy Allergy Status Severity Reaction(s) Onset  Inactive Treating Comm

ents 

Source



       Name   Type                        Date   Date   Clinician        

 

       No Known DA     Active U                                   [facility]



       Allergie                             5-24                        Clear



       s                                  00:00:                      [place]



                                          00                          [facility]

 

       No Known DA     Active U                                   [facility]



       Allergie                             5-24                        Clear



       s                                  00:00:                      [place]



                                          00                          [facility]







Social History







           Social Habit Start Date Stop Date  Quantity   Comments   Source

 

           History of Tobacco                                             Common

 [facility] -



           Use                                                    [facility]

 

           Sex Assigned At [birth year]                       [facility]



           [facility]









                Smoking Status  Start Date      Stop Date       Source

 

                Never Smoker                                    Common [facility]

 

                Former Smoker   2022 00:00:00 2022 00:00:00 Common S

[facility]







Medications







       Ordered Filled Start  Stop   Current Ordering Indication Dosage Frequency

 Signature

                    Comments            Components          Source



     Medication Medication Date Date Medication? Clinician                (SIG) 

          



     Name Name                                                   

 

     Azelastine Azelastine       No             2{spray QD   Azelastine   

        



     HCl 0.1 % HCl 0.1 % 8-12                     s_in_ea      HCl 0.1 %        

   



               00:00:                     ch_nost                     



               00                       ril}                     

 

     Azelastine Azelastine       No             2{spray QD   Azelastine   

        



     HCl 0.1 % HCl 0.1 % 8-12                     s_in_ea      HCl 0.1 %        

   



               00:00:                     ch_nost                     



               00                       ril}                     

 

     Azelastine Azelastine -0      No             2{spray QD   Azelastine   

        



     HCl 0.1 % HCl 0.1 % 8-12                     s_in_ea      HCl 0.1 %        

   



               00:00:                     ch_nost                     



               00                       ril}                     

 

     Azelastine Azelastine -0      No             2{spray QD   Azelastine   

        



     HCl 0.1 % HCl 0.1 % 8-12                     s_in_ea      HCl 0.1 %        

   



               00:00:                     ch_nost                     



               00                       ril}                     

 

     Sulfamethox Sulfamethox -0 - No             1{table BID  Sulfametho

           



     azole-Trime azole-Trime 4-06 04-11                t}        xazole-Tri     

      



     thoprim thoprim 00:00: 00:00                          methoprim           



     800-160 -160 MG 00   :00                           800-160 MG        

   

 

     Azithromyci Azithromyci 2021- No                  QD   Azithromyc   

        



     n 250 MG n 250 MG 2-21 12-26                          in 250 MG           



               00:00: 00:00                                         



               00   :00                                          

 

     Cefdinir Cefdinir -2021- No                  BID  Cefdinir           



     300  MG 0-19 10-24                          300 MG           



               00:00: 00:00                                         



               00   :00                                          

 

     Metoprolol Metoprolol 2021      No             1{table BID  Metoprolol   

        



     Tartrate 25 Tartrate 25 0-14                     t_with_      Tartrate     

      



     MG   MG   00:00:                     food}      25 MG           



               00                                                

 

     Metoprolol Metoprolol 2021      No             1{table BID  Metoprolol   

        



     Tartrate 25 Tartrate 25 0-14                     t_with_      Tartrate     

      



     MG   MG   00:00:                     food}      25 MG           



               00                                                

 

     Metoprolol Metoprolol 2021      No             1{table BID  Metoprolol   

        



     Tartrate 25 Tartrate 25 0-14                     t_with_      Tartrate     

      



     MG   MG   00:00:                     food}      25 MG           



               00                                                

 

     Metoprolol Metoprolol 2021      No             1{table BID               

  



     Tartrate 25 Tartrate 25 0-14                     t_with_                   

  



     MG   MG   00:00:                     food}                     



               00                                                

 

     Metoprolol Metoprolol 2021      No             1{table BID  Metoprolol   

        



     Tartrate 25 Tartrate 25 0-14                     t_with_      Tartrate     

      



     MG   MG   00:00:                     food}      25 MG           



               00                                                

 

     Cephalexin Cephalexin 0 - No             1{capsu QD   Cephalexin  

         



     250  MG                 le}       250 MG           



               00:00: 00:00                                         



               00   :00                                          

 

     Cephalexin Cephalexin -0 2- No             1{capsu QD   Cephalexin  

         



     250  MG                 le}       250 MG           



               00:00: 00:00                                         



               00   :00                                          

 

     Cetirizine Cetirizine -0      No             1{table      Cetirizine   

        



     HCl 10 MG HCl 10 MG 7-08                     t}        HCl 10 MG           



               00:00:                                              



               00                                                

 

     Famotidine Famotidine -0      Yes  Na Lopez                1 tablet     

      Common



               5-11                               as needed           [facility]



               00:00:                                              - [facility]



               00                                                [facility]

 

     Ranitidine Ranitidine -0      Yes  Na Lopez                1 tablet     

      Common



     HCl  HCl  5-11                                              [facility]



               00:00:                                              - [facility]



               00                                                [facility]

 

     Ranitidine Ranitidine -0      No             1{table QD   Ranitidine   

        



     HCl 150 MG HCl 150 MG 5-11                     t}        HCl 150 MG        

   



               00:00:                                              



               00                                                

 

     Ranitidine Ranitidine -0      No             1{table QD   Ranitidine   

        



     HCl 150 MG HCl 150 MG 5-11                     t}        HCl 150 MG        

   



               00:00:                                              



               00                                                

 

     Ranitidine Ranitidine -0      No             1{table QD   Ranitidine   

        



     HCl 150 MG HCl 150 MG 5-11                     t}        HCl 150 MG        

   



               00:00:                                              



               00                                                

 

     Ranitidine Ranitidine -0      No             1{table QD   Ranitidine   

        



     HCl 150 MG HCl 150 MG 5-11                     t}        HCl 150 MG        

   



               00:00:                                              



               00                                                

 

     Ranitidine Ranitidine -0      No             1{table QD                

  



     HCl 150 MG HCl 150 MG 5-11                     t}                       



               00:00:                                              



               00                                                

 

     Ranitidine Ranitidine 2020-0      No             1{table QD   Ranitidine   

        



     HCl 150 MG HCl 150 MG 5-11                     t}        HCl 150 MG        

   



               00:00:                                              



               00                                                

 

     Ranitidine Ranitidine -0      No             1{table QD   Ranitidine   

        



     HCl 150 MG HCl 150 MG 5-11                     t}        HCl 150 MG        

   



               00:00:                                              



               00                                                

 

     Ranitidine Ranitidine 2020-0      No             1{table QD   Ranitidine   

        



     HCl 150 MG HCl 150 MG 5-11                     t}        HCl 150 MG        

   



               00:00:                                              



               00                                                

 

     Ranitidine Ranitidine -0      No             1{table QD   Ranitidine   

        



     HCl 150 MG HCl 150 MG 5-11                     t}        HCl 150 MG        

   



               00:00:                                              



               00                                                

 

     Ranitidine Ranitidine -0      No             1{table QD   Ranitidine   

        



     HCl 150 MG HCl 150 MG 5-11                     t}        HCl 150 MG        

   



               00:00:                                              



               00                                                

 

     Ranitidine Ranitidine -0      No             1{table QD   Ranitidine   

        



     HCl 150 MG HCl 150 MG 5-11                     t}        HCl 150 MG        

   



               00:00:                                              



               00                                                

 

     Ranitidine Ranitidine -0      No             1{table QD   Ranitidine   

        



     HCl 150 MG HCl 150 MG 5-11                     t}        HCl 150 MG        

   



               00:00:                                              



               00                                                

 

     Ranitidine Ranitidine -0      No             1{table QD   Ranitidine   

        



     HCl 150 MG HCl 150 MG 5-11                     t}        HCl 150 MG        

   



               00:00:                                              



               00                                                

 

     Ranitidine Ranitidine -0      No             1{table QD   Ranitidine   

        



     HCl 150 MG HCl 150 MG 5-11                     t}        HCl 150 MG        

   



               00:00:                                              



               00                                                

 

     Ranitidine Ranitidine -0      No             1{table QD   Ranitidine   

        



     HCl 150 MG HCl 150 MG 5-11                     t}        HCl 150 MG        

   



               00:00:                                              



               00                                                

 

     Ranitidine Ranitidine -0      No             1{table QD   Ranitidine   

        



     HCl 150 MG HCl 150 MG 5-11                     t}        HCl 150 MG        

   



               00:00:                                              



               00                                                

 

     Macrobid Macrobid -0      Yes  Na Lopez                1 capsule        

   Common



                                              with food           [facility]



               00:00:                                              - [facility]



               00                                                [facility]

 

     Macrobid Macrobid 0      No             1{capsu BID  Macrobid         

  



     100  MG -24                     le_with      100 MG           



               00:00:                     _food}                     



                                                               

 

     Macrobid Macrobid -0      No             1{capsu BID  Macrobid         

  



     100  MG 9-24                     le_with      100 MG           



               00:00:                     _food}                     



               00                                                

 

     Macrobid Macrobid -0      No             1{capsu BID  Macrobid         

  



     100  MG 9-24                     le_with      100 MG           



               00:00:                     _food}                     



               00                                                

 

     Macrobid Macrobid -0      No             1{capsu BID  Macrobid         

  



     100  MG 9-24                     le_with      100 MG           



               00:00:                     _food}                     



               00                                                

 

     Macrobid Macrobid -0      No             1{capsu BID                 



     100  MG 9-24                     le_with                     



               00:00:                     _food}                     



               00                                                

 

     Macrobid Macrobid 2018-0      No             1{capsu BID  Macrobid         

  



     100  MG 9-24                     le_with      100 MG           



               00:00:                     _food}                     



               00                                                

 

     Macrobid Macrobid 2018-0      No             1{capsu BID  Macrobid         

  



     100  MG 9-24                     le_with      100 MG           



               00:00:                     _food}                     



               00                                                

 

     Macrobid Macrobid 2018-0      No             1{capsu BID  Macrobid         

  



     100  MG 9-24                     le_with      100 MG           



               00:00:                     _food}                     



               00                                                

 

     Macrobid Macrobid 2018-0      No             1{capsu BID  Macrobid         

  



     100  MG 9-24                     le_with      100 MG           



               00:00:                     _food}                     



               00                                                

 

     Macrobid Macrobid 2018-0      No             1{capsu BID  Macrobid         

  



     100  MG 9-24                     le_with      100 MG           



               00:00:                     _food}                     



               00                                                

 

     Macrobid Macrobid 2018-0      No             1{capsu BID  Macrobid         

  



     100  MG 9-24                     le_with      100 MG           



               00:00:                     _food}                     



               00                                                

 

     Macrobid Macrobid 2018-0      No             1{capsu BID  Macrobid         

  



     100  MG 9-24                     le_with      100 MG           



               00:00:                     _food}                     



               00                                                

 

     Macrobid Macrobid 2018-0      No             1{capsu BID  Macrobid         

  



     100  MG 9-24                     le_with      100 MG           



               00:00:                     _food}                     



               00                                                

 

     Macrobid Macrobid 2018-0      No             1{capsu BID  Macrobid         

  



     100  MG 9-24                     le_with      100 MG           



               00:00:                     _food}                     



               00                                                

 

     Macrobid Macrobid 2018-0      No             1{capsu BID  Macrobid         

  



     100  MG 9-24                     le_with      100 MG           



               00:00:                     _food}                     



               00                                                

 

     Macrobid Macrobid 2018-0      No             1{capsu BID  Macrobid         

  



     100  MG 9-24                     le_with      100 MG           



               00:00:                     _food}                     



               00                                                

 

     Macrobid Macrobid 2018-0      No             1{capsu BID  Macrobid         

  



     100  MG 7-16                     le_with      100 MG           



               00:00:                     _food}                     



               00                                                

 

     Macrobid Macrobid 2018-0      No             1{capsu BID  Macrobid         

  



     100  MG 7-16                     le_with      100 MG           



               00:00:                     _food}                     



               00                                                

 

     Macrobid Macrobid 2018-0      No             1{capsu BID  Macrobid         

  



     100  MG 7-16                     le_with      100 MG           



               00:00:                     _food}                     



               00                                                

 

     Macrobid Macrobid 2018-0      No             1{capsu BID  Macrobid         

  



     100  MG 7-16                     le_with      100 MG           



               00:00:                     _food}                     



               00                                                

 

     Macrobid Macrobid 2018-0      No             1{capsu BID                 



     100  MG 7-16                     le_with                     



               00:00:                     _food}                     



               00                                                

 

     Macrobid Macrobid 2018-0      No             1{capsu BID  Macrobid         

  



     100  MG 7-16                     le_with      100 MG           



               00:00:                     _food}                     



               00                                                

 

     Macrobid Macrobid 2018-0      No             1{capsu BID  Macrobid         

  



     100  MG 7-16                     le_with      100 MG           



               00:00:                     _food}                     



               00                                                

 

     Macrobid Macrobid 2018-0      No             1{capsu BID  Macrobid         

  



     100  MG 7-16                     le_with      100 MG           



               00:00:                     _food}                     



               00                                                

 

     Macrobid Macrobid 2018-0      No             1{capsu BID  Macrobid         

  



     100  MG 7-16                     le_with      100 MG           



               00:00:                     _food}                     



               00                                                

 

     Macrobid Macrobid 2018-0      No             1{capsu BID  Macrobid         

  



     100  MG 7-16                     le_with      100 MG           



               00:00:                     _food}                     



               00                                                

 

     Macrobid Macrobid 2018-0      No             1{capsu BID  Macrobid         

  



     100  MG 7-16                     le_with      100 MG           



               00:00:                     _food}                     



               00                                                

 

     Macrobid Macrobid 2018-0      No             1{capsu BID  Macrobid         

  



     100  MG 7-16                     le_with      100 MG           



               00:00:                     _food}                     



               00                                                

 

     Macrobid Macrobid 2018-0      No             1{capsu BID  Macrobid         

  



     100  MG 7-16                     le_with      100 MG           



               00:00:                     _food}                     



               00                                                

 

     Macrobid Macrobid 2018-0      No             1{capsu BID  Macrobid         

  



     100  MG 7-16                     le_with      100 MG           



               00:00:                     _food}                     



               00                                                

 

     Macrobid Macrobid 2018-0      No             1{capsu BID  Macrobid         

  



     100  MG 7-16                     le_with      100 MG           



               00:00:                     _food}                     



               00                                                

 

     Macrobid Macrobid 2018-0      No             1{capsu BID  Macrobid         

  



     100  MG 7-16                     le_with      100 MG           



               00:00:                     _food}                     



               00                                                

 

     Estradiol Estradiol           Yes  Na Lopez                as             Co

mmon



                                                  directed           [facility]

 

     Isosorbide Isosorbide           Yes  Na Lopez                TOME CARLOS       

    Common



     Dinitrate Dinitrate                                    TABLETA           Sp

jose miguel



                                                  [facility]

 

     Toprol XL Toprol XL           Yes  Na Lopez                TAKE 1           

Common



                                                  TABLET BY           [facility]



                                                  MOUTH           [facility]



                                                  DAILY           [facility]

 

     NovoFine NovoFine           Yes  Na Lopez                as             Comm

on



     Plus Plus                                    directed           [facility]

 

     Cozaar Cozaar           Yes  Na Lopez                1 tablet           Comm

on



                                                                 [facility]

 

     Metoprolol Metoprolol           Yes  Na Lopez                1 tablet       

    Common



     Succinate Succinate                                                   [facility]

t



     ER   ER                                                     [facility]

 

     Furosemide Furosemide           Yes  Na Lopez                1 tablet       

    Common



                                                                 [facility]

 

     Cetirizine Cetirizine           Yes  Na Lopez                TOME CARLOS       

    Common



     HCl  HCl                                     TABLETA           [facility]

 

     Omeprazole Omeprazole           Yes  Na Lopez                1 capsule      

     Common



                                                                 [facility]

 

     Promethazin Promethazin           Yes  Na Lopez                1 tablet     

      Common



     e HCl e HCl                                    as needed           [facility]

 

     Zocor Zocor           Yes  Na Lopez                TOME CARLOS           Common



                                                  TABLETA           [facility]



                                                  [facility]

 

     Trimethopri Trimethopri           Yes  Na Lopez                1 tablet     

      Common



     m    m                                                      [facility]

 

     Tradjenta Tradjenta           Yes  Na Lopez                TOME CARLOS         

  Common



                                                  TABLETA           [facility]



                                                  [facility]

 

     NovoFine NovoFine           Yes  Na Lopez                as             Comm

on



     Plus Plus                                    directed           [facility]

 

     Furosemide Furosemide           Yes  Na Lopez                1 tablet       

    Common



                                                                 [facility]

 

     Isosorbide Isosorbide           Yes  Na Lopez                1 tablet       

    Common



     Dinitrate Dinitrate                                                   Spiri

t



                                                                 [facility]

 

     Flonase Flonase           Yes  Na Lopez                1 spray in           

Common



                                                  each           [facility]



                                                  nostril           [facility]

 

     Levemir Levemir           Yes  Na Lopez                50 units           Co

mmon



                                                                 [facility]

 

     Citalopram Citalopram           Yes  Na Lopez                TOME CARLOS       

    Common



     Hydrobromid Hydrobromid                                    TABLETA         

  [facility]



     e    e                                       POR VIA           - [facility]



                                                  ORAL ONCE           St



                                                  A DAY           [facility]

 

     Simvastatin Simvastatin           Yes  Na Lopez                TOME CARLOS     

      Common



                                                  TABLETA           [facility]



                                                  TODOS LOS           - [facility]



                                                  PADRON EN LA           [facility]

 

     Premarin Premarin           Yes  Na Lopez                1 gm           Comm

on



                                                                 [facility]

 

     BD Pen BD Pen           Yes  Na Lopez                USE 1           Common



     Needle Needle                                    NEEDLE           [facility]



     Short U/F Short U/F                                    WITH PEN 3          

 - [facility]



                                                  TIMES           [facility]

 

     NovoLog NovoLog           Yes  Na Lopez                as             Common



     Flexpen Flexpen                                    directed           [facility]

t



                                                                 - [facility]

 

     Aspir-81 Aspir-81           Yes  Na Lopez                1 tablet           

Common



                                                                 [facility]



                                                                 [facility]

 

     Simvastatin Simvastatin           Yes  Na Lopez                1 tablet     

      Common



                                                  in the           [facility]



                                                  evening           [facility]

 

     Tradjenta 5 Tradjenta 5           No                       Tradjenta       

    



     MG   MG                                      5 MG           

 

     Levemir Levemir           No                       Levemir           



     FlexTouch FlexTouch                                    FlexTouch           



     100 UNIT/ UNIT/ML                                    100            



                                                  UNIT/ML           

 

     Nitroglycer Nitroglycer           No                       Nitroglyce      

     



     in 0.4 MG in 0.4 MG                                    rin 0.4 MG          

 

 

     Cozaar 25 Cozaar 25           No             1{table QD   Cozaar 25        

   



     MG   MG                            t}        MG             

 

     NovoFine NovoFine           No                       NovoFine           



     Plus 32G X Plus 32G X                                    Plus 32G X        

   



     4 MM 4 MM                                    4 MM           

 

     Isosorbide Isosorbide           No             1{table QD   Isosorbide     

      



     Dinitrate Dinitrate                          t}        Dinitrate           



     30 MG 30 MG                                    30 MG           

 

     Famotidine Famotidine           No             1{table QD   Famotidine     

      



     40 MG 40 MG                          t_as_ne      40 MG           



                                        eded}                     

 

     Aspir-81 81 Aspir-81 81           No             1{table QD   Aspir-81     

      



     MG   MG                            t}        81 MG           

 

     Omeprazole Omeprazole           No             1{capsu QD   Omeprazole     

      



     40 MG 40 MG                          le}       40 MG           

 

     Estradiol Estradiol           No                       Estradiol           



     0.1 MG/GM 0.1 MG/GM                                    0.1 MG/GM           

 

     NovoFine NovoFine           No                       NovoFine           



     Plus 32G X Plus 32G X                                    Plus 32G X        

   



     4 MM 4 MM                                    4 MM           

 

     Simvastatin Simvastatin           No                       Simvastati      

     



     80 MG 80 MG                                    n 80 MG           

 

     Toprol XL Toprol XL           No                       Toprol XL           



     25 MG 25 MG                                    25 MG           

 

     Promethazin Promethazin           No             1{table QID  Promethazi   

        



     e HCl 25 MG e HCl 25 MG                          t_as_ne      ne HCl 25    

       



                                        eded}      MG             

 

     Trimethopri Trimethopri           No             1{table QD   Trimethopr   

        



     m 100 MG m 100 MG                          t}        im 100 MG           

 

     Flonase 50 Flonase 50           No             1{spray QD   Flonase 50     

      



     MCG/ACT MCG/ACT                          _in_eac      MCG/ACT           



                                        h_nostr                     



                                        il}                      

 

     Furosemide Furosemide           No                       Furosemide        

   



     40 MG 40 MG                                    40 MG           

 

     Cetirizine Cetirizine           No                       Cetirizine        

   



     HCl 10 MG HCl 10 MG                                    HCl 10 MG           

 

     Furosemide Furosemide           No             1{table QD   Furosemide     

      



     40 MG 40 MG                          t}        40 MG           

 

     Isosorbide Isosorbide           No                       Isosorbide        

   



     Dinitrate Dinitrate                                    Dinitrate           



     30 MG 30 MG                                    30 MG           

 

     Zocor 80 MG Zocor 80 MG           No                       Zocor 80        

   



                                                  MG             

 

     Atorvastati Atorvastati           No             1{table QD   Atorvastat   

        



     n Calcium n Calcium                          t}        in Calcium          

 



     40 MG 40 MG                                    40 MG           

 

     BD Pen BD Pen           No                       BD Pen           



     Needle Needle                                    Needle           



     Short U/F Short U/F                                    Short U/F           



     31G X 8 MM 31G X 8 MM                                    31G X 8 MM        

   

 

     Ferrous Ferrous           No             1{table TID  Ferrous           



     Sulfate 325 Sulfate 325                          t}        Sulfate         

  



     (65 Fe) MG (65 Fe) MG                                    325 (65           



                                                  Fe) MG           

 

     Clopidogrel Clopidogrel           No             1{table QD   Clopidogre   

        



     Bisulfate Bisulfate                          t}        l              



     75 MG 75 MG                                    Bisulfate           



                                                  75 MG           

 

     NovoLOG NovoLOG           No                       NovoLOG           



     FlexPen 100 FlexPen 100                                    FlexPen         

  



     UNIT/ML UNIT/ML                                    100            



                                                  UNIT/ML           

 

     Metoprolol Metoprolol           No             1{table QD   Metoprolol     

      



     Succinate Succinate                          t}        Succinate           



     ER 25 MG ER 25 MG                                    ER 25 MG           

 

     Levemir 100 Levemir 100           No                  QD   Levemir         

  



     UNIT/ML UNIT/ML                                    100            



                                                  UNIT/ML           

 

     Premarin Premarin           No                       Premarin           



     0.625 MG/GM 0.625 MG/GM                                    0.625           



                                                  MG/GM           

 

     Ferrous Ferrous           No             1{table QD   Ferrous           



     Sulfate 325 Sulfate 325                          t}        Sulfate         

  



     (65 Fe) MG (65 Fe) MG                                    325 (65           



                                                  Fe) MG           

 

     Citalopram Citalopram           No                       Citalopram        

   



     Hydrobromid Hydrobromid                                    Hydrobromi      

     



     e 10 MG e 10 MG                                    de 10 MG           

 

     Cephalexin Cephalexin           No                       Cephalexin        

   



     250  MG                                    250 MG           

 

     Premarin Premarin           No                       Premarin           



     0.625 MG/GM 0.625 MG/GM                                    0.625           



                                                  MG/GM           

 

     Ferrous Ferrous           No             1{table QD   Ferrous           



     Sulfate 325 Sulfate 325                          t}        Sulfate         

  



     (65 Fe) MG (65 Fe) MG                                    325 (65           



                                                  Fe) MG           

 

     Cozaar 25 Cozaar 25           No             1{table QD   Cozaar 25        

   



     MG   MG                            t}        MG             

 

     Cephalexin Cephalexin           No                       Cephalexin        

   



     250  MG                                    250 MG           

 

     Tradjenta 5 Tradjenta 5           No                       Tradjenta       

    



     MG   MG                                      5 MG           

 

     NovoFine NovoFine           No                       NovoFine           



     Plus 32G X Plus 32G X                                    Plus 32G X        

   



     4 MM 4 MM                                    4 MM           

 

     Cetirizine Cetirizine           No                       Cetirizine        

   



     HCl 10 MG HCl 10 MG                                    HCl 10 MG           

 

     Cetirizine Cetirizine           No             1{table      Cetirizine     

      



     HCl 10 MG HCl 10 MG                          t}        HCl 10 MG           

 

     Levemir Levemir           No                       Levemir           



     FlexTouch FlexTouch                                    FlexTouch           



     100 UNIT/ UNIT/ML                                    100            



                                                  UNIT/ML           

 

     Levemir 100 Levemir 100           No                  QD   Levemir         

  



     UNIT/ML UNIT/ML                                    100            



                                                  UNIT/ML           

 

     Omeprazole Omeprazole           No             1{capsu QD   Omeprazole     

      



     40 MG 40 MG                          le}       40 MG           

 

     Atorvastati Atorvastati           No             1{table QD   Atorvastat   

        



     n Calcium n Calcium                          t}        in Calcium          

 



     40 MG 40 MG                                    40 MG           

 

     NovoLOG NovoLOG           No                       NovoLOG           



     FlexPen 100 FlexPen 100                                    FlexPen         

  



     UNIT/ML UNIT/ML                                    100            



                                                  UNIT/ML           

 

     Famotidine Famotidine           No             1{table QD   Famotidine     

      



     40 MG 40 MG                          t_as_ne      40 MG           



                                        eded}                     

 

     Promethazin Promethazin           No             1{table QID  Promethazi   

        



     e HCl 25 MG e HCl 25 MG                          t_as_ne      ne HCl 25    

       



                                        eded}      MG             

 

     Mirtazapine Mirtazapine           No             1{table QD   Mirtazapin   

        



     7.5 MG 7.5 MG                          t_at_be      e 7.5 MG           



                                        dtime}                     

 

     Isosorbide Isosorbide           No             1{table QD   Isosorbide     

      



     Dinitrate Dinitrate                          t}        Dinitrate           



     30 MG 30 MG                                    30 MG           

 

     Clopidogrel Clopidogrel           No             1{table QD   Clopidogre   

        



     Bisulfate Bisulfate                          t}        l              



     75 MG 75 MG                                    Bisulfate           



                                                  75 MG           

 

     Furosemide Furosemide           No                       Furosemide        

   



     40 MG 40 MG                                    40 MG           

 

     BD Pen BD Pen           No                       BD Pen           



     Needle Needle                                    Needle           



     Short U/F Short U/F                                    Short U/F           



     31G X 8 MM 31G X 8 MM                                    31G X 8 MM        

   

 

     Nitroglycer Nitroglycer           No                       Nitroglyce      

     



     in 0.4 MG in 0.4 MG                                    rin 0.4 MG          

 

 

     Furosemide Furosemide           No             1{table QD   Furosemide     

      



     40 MG 40 MG                          t}        40 MG           

 

     Citalopram Citalopram           No             1{table QD   Citalopram     

      



     Hydrobromid Hydrobromid                          t}        Hydrobromi      

     



     e 20 MG e 20 MG                                    de 20 MG           

 

     Flonase 50 Flonase 50           No             1{spray QD   Flonase 50     

      



     MCG/ACT MCG/ACT                          _in_eac      MCG/ACT           



                                        h_nostr                     



                                        il}                      

 

     Zocor 80 MG Zocor 80 MG           No                       Zocor 80        

   



                                                  MG             

 

     Metoprolol Metoprolol           No             1{table QD   Metoprolol     

      



     Succinate Succinate                          t}        Succinate           



     ER 25 MG ER 25 MG                                    ER 25 MG           

 

     Simvastatin Simvastatin           No                       Simvastati      

     



     80 MG 80 MG                                    n 80 MG           

 

     NovoFine NovoFine           No                       NovoFine           



     Plus 32G X Plus 32G X                                    Plus 32G X        

   



     4 MM 4 MM                                    4 MM           

 

     Aspir-81 81 Aspir-81 81           No             1{table QD   Aspir-81     

      



     MG   MG                            t}        81 MG           

 

     Ferrous Ferrous           No             1{table TID  Ferrous           



     Sulfate 325 Sulfate 325                          t}        Sulfate         

  



     (65 Fe) MG (65 Fe) MG                                    325 (65           



                                                  Fe) MG           

 

     Isosorbide Isosorbide           No                       Isosorbide        

   



     Dinitrate Dinitrate                                    Dinitrate           



     30 MG 30 MG                                    30 MG           

 

     Trimethopri Trimethopri           No             1{table QD   Trimethopr   

        



     m 100 MG m 100 MG                          t}        im 100 MG           

 

     Estradiol Estradiol           No                       Estradiol           



     0.1 MG/GM 0.1 MG/GM                                    0.1 MG/GM           

 

     Citalopram Citalopram           No             1{table QD   Citalopram     

      



     Hydrobromid Hydrobromid                          t}        Hydrobromi      

     



     e 20 MG e 20 MG                                    de 20 MG           

 

     Premarin Premarin           No                       Premarin           



     0.625 MG/GM 0.625 MG/GM                                    0.625           



                                                  MG/GM           

 

     Aspir-81 81 Aspir-81 81           No             1{table QD   Aspir-81     

      



     MG   MG                            t}        81 MG           

 

     NovoFine NovoFine           No                       NovoFine           



     Plus 32G X Plus 32G X                                    Plus 32G X        

   



     4 MM 4 MM                                    4 MM           

 

     Cetirizine Cetirizine           No                       Cetirizine        

   



     HCl 10 MG HCl 10 MG                                    HCl 10 MG           

 

     BD Pen BD Pen           No                       BD Pen           



     Needle Needle                                    Needle           



     Short U/F Short U/F                                    Short U/F           



     31G X 8 MM 31G X 8 MM                                    31G X 8 MM        

   

 

     Clopidogrel Clopidogrel           No             1{table QD   Clopidogre   

        



     Bisulfate Bisulfate                          t}        l              



     75 MG 75 MG                                    Bisulfate           



                                                  75 MG           

 

     Isosorbide Isosorbide           No                       Isosorbide        

   



     Dinitrate Dinitrate                                    Dinitrate           



     30 MG 30 MG                                    30 MG           

 

     Estradiol Estradiol           No                       Estradiol           



     0.1 MG/GM 0.1 MG/GM                                    0.1 MG/GM           

 

     Nitroglycer Nitroglycer           No                       Nitroglyce      

     



     in 0.4 MG in 0.4 MG                                    rin 0.4 MG          

 

 

     Furosemide Furosemide           No                       Furosemide        

   



     40 MG 40 MG                                    40 MG           

 

     Mirtazapine Mirtazapine           No             1{table QD   Mirtazapin   

        



     7.5 MG 7.5 MG                          t_at_be      e 7.5 MG           



                                        dtime}                     

 

     Cetirizine Cetirizine           No             1{table      Cetirizine     

      



     HCl 10 MG HCl 10 MG                          t}        HCl 10 MG           

 

     Flonase 50 Flonase 50           No             1{spray QD   Flonase 50     

      



     MCG/ACT MCG/ACT                          _in_eac      MCG/ACT           



                                        h_nostr                     



                                        il}                      

 

     Levemir Levemir           No                       Levemir           



     FlexTouch FlexTouch                                    FlexTouch           



     100 UNIT/ UNIT/ML                                    100            



                                                  UNIT/ML           

 

     Trimethopri Trimethopri           No             1{table QD   Trimethopr   

        



     m 100 MG m 100 MG                          t}        im 100 MG           

 

     Famotidine Famotidine           No             1{table QD   Famotidine     

      



     40 MG 40 MG                          t_as_ne      40 MG           



                                        eded}                     

 

     Ferrous Ferrous           No             1{table QD   Ferrous           



     Sulfate 325 Sulfate 325                          t}        Sulfate         

  



     (65 Fe) MG (65 Fe) MG                                    325 (65           



                                                  Fe) MG           

 

     Isosorbide Isosorbide           No             1{table QD   Isosorbide     

      



     Dinitrate Dinitrate                          t}        Dinitrate           



     30 MG 30 MG                                    30 MG           

 

     Zocor 80 MG Zocor 80 MG           No                       Zocor 80        

   



                                                  MG             

 

     Atorvastati Atorvastati           No             1{table QD   Atorvastat   

        



     n Calcium n Calcium                          t}        in Calcium          

 



     40 MG 40 MG                                    40 MG           

 

     Cephalexin Cephalexin           No             1{capsu QD   Cephalexin     

      



     250  MG                          le}       250 MG           

 

     NovoLOG NovoLOG           No                       NovoLOG           



     FlexPen 100 FlexPen 100                                    FlexPen         

  



     UNIT/ML UNIT/ML                                    100            



                                                  UNIT/ML           

 

     Furosemide Furosemide           No             1{table QD   Furosemide     

      



     40 MG 40 MG                          t}        40 MG           

 

     Simvastatin Simvastatin           No                       Simvastati      

     



     80 MG 80 MG                                    n 80 MG           

 

     Cephalexin Cephalexin           No                       Cephalexin        

   



     250  MG                                    250 MG           

 

     NovoFine NovoFine           No                       NovoFine           



     Plus 32G X Plus 32G X                                    Plus 32G X        

   



     4 MM 4 MM                                    4 MM           

 

     Levemir 100 Levemir 100           No                  QD   Levemir         

  



     UNIT/ML UNIT/ML                                    100            



                                                  UNIT/ML           

 

     Tradjenta 5 Tradjenta 5           No                       Tradjenta       

    



     MG   MG                                      5 MG           

 

     Promethazin Promethazin           No             1{table QID  Promethazi   

        



     e HCl 25 MG e HCl 25 MG                          t_as_ne      ne HCl 25    

       



                                        eded}      MG             

 

     Omeprazole Omeprazole           No             1{capsu QD   Omeprazole     

      



     40 MG 40 MG                          le}       40 MG           

 

     Ferrous Ferrous           No             1{table TID  Ferrous           



     Sulfate 325 Sulfate 325                          t}        Sulfate         

  



     (65 Fe) MG (65 Fe) MG                                    325 (65           



                                                  Fe) MG           

 

     Metoprolol Metoprolol           No             1{table QD   Metoprolol     

      



     Succinate Succinate                          t}        Succinate           



     ER 25 MG ER 25 MG                                    ER 25 MG           

 

     Cozaar 25 Cozaar 25           No             1{table QD   Cozaar 25        

   



     MG   MG                            t}        MG             

 

     Promethazin Promethazin           No             1{table QID  Promethazi   

        



     e HCl 25 MG e HCl 25 MG                          t_as_ne      ne HCl 25    

       



                                        eded}      MG             

 

     NovoFine NovoFine           No                       NovoFine           



     Plus 32G X Plus 32G X                                    Plus 32G X        

   



     4 MM 4 MM                                    4 MM           

 

     Ferrous Ferrous           No             1{table QD   Ferrous           



     Sulfate 325 Sulfate 325                          t}        Sulfate         

  



     (65 Fe) MG (65 Fe) MG                                    325 (65           



                                                  Fe) MG           

 

     Famotidine Famotidine           No             1{table QD   Famotidine     

      



     40 MG 40 MG                          t_as_ne      40 MG           



                                        eded}                     

 

     Simvastatin Simvastatin           No                       Simvastati      

     



     80 MG 80 MG                                    n 80 MG           

 

     Ferrous Ferrous           No             1{table TID  Ferrous           



     Sulfate 325 Sulfate 325                          t}        Sulfate         

  



     (65 Fe) MG (65 Fe) MG                                    325 (65           



                                                  Fe) MG           

 

     Cephalexin Cephalexin           No                       Cephalexin        

   



     250  MG                                    250 MG           

 

     Premarin Premarin           No                       Premarin           



     0.625 MG/GM 0.625 MG/GM                                    0.625           



                                                  MG/GM           

 

     Tradjenta 5 Tradjenta 5           No                       Tradjenta       

    



     MG   MG                                      5 MG           

 

     Furosemide Furosemide           No                       Furosemide        

   



     40 MG 40 MG                                    40 MG           

 

     Cephalexin Cephalexin           No             1{capsu QD   Cephalexin     

      



     250  MG                          le}       250 MG           

 

     Isosorbide Isosorbide           No                       Isosorbide        

   



     Dinitrate Dinitrate                                    Dinitrate           



     30 MG 30 MG                                    30 MG           

 

     Citalopram Citalopram           No             1{table QD   Citalopram     

      



     Hydrobromid Hydrobromid                          t}        Hydrobromi      

     



     e 20 MG e 20 MG                                    de 20 MG           

 

     NovoFine NovoFine           No                       NovoFine           



     Plus 32G X Plus 32G X                                    Plus 32G X        

   



     4 MM 4 MM                                    4 MM           

 

     Cetirizine Cetirizine           No             1{table      Cetirizine     

      



     HCl 10 MG HCl 10 MG                          t}        HCl 10 MG           

 

     Cetirizine Cetirizine           No                       Cetirizine        

   



     HCl 10 MG HCl 10 MG                                    HCl 10 MG           

 

     Mirtazapine Mirtazapine           No             1{table QD   Mirtazapin   

        



     7.5 MG 7.5 MG                          t_at_be      e 7.5 MG           



                                        dtime}                     

 

     Clopidogrel Clopidogrel           No             1{table QD   Clopidogre   

        



     Bisulfate Bisulfate                          t}        l              



     75 MG 75 MG                                    Bisulfate           



                                                  75 MG           

 

     Trimethopri Trimethopri           No             1{table QD   Trimethopr   

        



     m 100 MG m 100 MG                          t}        im 100 MG           

 

     Cozaar 25 Cozaar 25           No             1{table QD   Cozaar 25        

   



     MG   MG                            t}        MG             

 

     Nitroglycer Nitroglycer           No                       Nitroglyce      

     



     in 0.4 MG in 0.4 MG                                    rin 0.4 MG          

 

 

     Omeprazole Omeprazole           No             1{capsu QD   Omeprazole     

      



     40 MG 40 MG                          le}       40 MG           

 

     Estradiol Estradiol           No                       Estradiol           



     0.1 MG/GM 0.1 MG/GM                                    0.1 MG/GM           

 

     Flonase 50 Flonase 50           No             1{spray QD   Flonase 50     

      



     MCG/ACT MCG/ACT                          _in_eac      MCG/ACT           



                                        h_nostr                     



                                        il}                      

 

     Levemir Levemir           No                       Levemir           



     FlexTouch FlexTouch                                    FlexTouch           



     100 UNIT/ UNIT/ML                                    100            



                                                  UNIT/ML           

 

     NovoLOG NovoLOG           No                       NovoLOG           



     FlexPen 100 FlexPen 100                                    FlexPen         

  



     UNIT/ML UNIT/ML                                    100            



                                                  UNIT/ML           

 

     Furosemide Furosemide           No             1{table QD   Furosemide     

      



     40 MG 40 MG                          t}        40 MG           

 

     Aspir-81 81 Aspir-81 81           No             1{table QD   Aspir-81     

      



     MG   MG                            t}        81 MG           

 

     BD Pen BD Pen           No                       BD Pen           



     Needle Needle                                    Needle           



     Short U/F Short U/F                                    Short U/F           



     31G X 8 MM 31G X 8 MM                                    31G X 8 MM        

   

 

     Metoprolol Metoprolol           No             1{table QD   Metoprolol     

      



     Succinate Succinate                          t}        Succinate           



     ER 25 MG ER 25 MG                                    ER 25 MG           

 

     Atorvastati Atorvastati           No             1{table QD   Atorvastat   

        



     n Calcium n Calcium                          t}        in Calcium          

 



     40 MG 40 MG                                    40 MG           

 

     Zocor 80 MG Zocor 80 MG           No                       Zocor 80        

   



                                                  MG             

 

     Levemir 100 Levemir 100           No                  QD   Levemir         

  



     UNIT/ML UNIT/ML                                    100            



                                                  UNIT/ML           

 

     Promethazin Promethazin           No             1{table QID               

  



     e HCl 25 MG e HCl 25 MG                          t_as_ne                   

  



                                        eded}                     

 

     NovoFine NovoFine           No                                      



     Plus 32G X Plus 32G X                                                   



     4 MM 4 MM                                                   

 

     Ferrous Ferrous           No             1{table QD                  



     Sulfate 325 Sulfate 325                          t}                       



     (65 Fe) MG (65 Fe) MG                                                   

 

     Famotidine Famotidine           No             1{table QD                  



     40 MG 40 MG                          t_as_ne                     



                                        eded}                     

 

     Simvastatin Simvastatin           No                                      



     80 MG 80 MG                                                   

 

     Ferrous Ferrous           No             1{table TID                 



     Sulfate 325 Sulfate 325                          t}                       



     (65 Fe) MG (65 Fe) MG                                                   

 

     Cephalexin Cephalexin           No                                      



     250  MG                                                   

 

     Premarin Premarin           No                                      



     0.625 MG/GM 0.625 MG/GM                                                   

 

     Tradjenta 5 Tradjenta 5           No                                      



     MG   MG                                                     

 

     Furosemide Furosemide           No                                      



     40 MG 40 MG                                                   

 

     Cephalexin Cephalexin           No             1{capsu QD                  



     250  MG                          le}                      

 

     Isosorbide Isosorbide           No                                      



     Dinitrate Dinitrate                                                   



     30 MG 30 MG                                                   

 

     Citalopram Citalopram           No             1{table QD                  



     Hydrobromid Hydrobromid                          t}                       



     e 20 MG e 20 MG                                                   

 

     NovoFine NovoFine           No                                      



     Plus 32G X Plus 32G X                                                   



     4 MM 4 MM                                                   

 

     Cetirizine Cetirizine           No             1{table                     



     HCl 10 MG HCl 10 MG                          t}                       

 

     Cetirizine Cetirizine           No                                      



     HCl 10 MG HCl 10 MG                                                   

 

     Mirtazapine Mirtazapine           No             1{table QD                

  



     7.5 MG 7.5 MG                          t_at_be                     



                                        dtime}                     

 

     Clopidogrel Clopidogrel           No             1{table QD                

  



     Bisulfate Bisulfate                          t}                       



     75 MG 75 MG                                                   

 

     Trimethopri Trimethopri           No             1{table QD                

  



     m 100 MG m 100 MG                          t}                       

 

     Cozaar 25 Cozaar 25           No             1{table QD                  



     MG   MG                            t}                       

 

     Nitroglycer Nitroglycer           No                                      



     in 0.4 MG in 0.4 MG                                                   

 

     Omeprazole Omeprazole           No             1{capsu QD                  



     40 MG 40 MG                          le}                      

 

     Estradiol Estradiol           No                                      



     0.1 MG/GM 0.1 MG/GM                                                   

 

     Flonase 50 Flonase 50           No             1{spray QD                  



     MCG/ACT MCG/ACT                          _in_eac                     



                                        h_nostr                     



                                        il}                      

 

     Levemir Levemir           No                                      



     FlexTouch FlexTouch                                                   



     100 UNIT/ UNIT/ML                                                   

 

     NovoLOG NovoLOG           No                                      



     FlexPen 100 FlexPen 100                                                   



     UNIT/ML UNIT/ML                                                   

 

     Furosemide Furosemide           No             1{table QD                  



     40 MG 40 MG                          t}                       

 

     Aspir-81 81 Aspir-81 81           No             1{table QD                

  



     MG   MG                            t}                       

 

     BD Pen BD Pen           No                                      



     Needle Needle                                                   



     Short U/F Short U/F                                                   



     31G X 8 MM 31G X 8 MM                                                   

 

     Metoprolol Metoprolol           No             1{table QD                  



     Succinate Succinate                          t}                       



     ER 25 MG ER 25 MG                                                   

 

     Atorvastati Atorvastati           No             1{table QD                

  



     n Calcium n Calcium                          t}                       



     40 MG 40 MG                                                   

 

     Zocor 80 MG Zocor 80 MG           No                                      

 

     Levemir 100 Levemir 100           No                  QD                  



     UNIT/ML UNIT/ML                                                   

 

     Famotidine Famotidine           No             1{table QD   Famotidine     

      



     40 MG 40 MG                          t_as_ne      40 MG           



                                        eded}                     

 

     NovoFine NovoFine           No                       NovoFine           



     Plus 32G X Plus 32G X                                    Plus 32G X        

   



     4 MM 4 MM                                    4 MM           

 

     Ferrous Ferrous           No             1{table QD   Ferrous           



     Sulfate 325 Sulfate 325                          t}        Sulfate         

  



     (65 Fe) MG (65 Fe) MG                                    325 (65           



                                                  Fe) MG           

 

     Simvastatin Simvastatin           No                       Simvastati      

     



     80 MG 80 MG                                    n 80 MG           

 

     Tradjenta 5 Tradjenta 5           No                       Tradjenta       

    



     MG   MG                                      5 MG           

 

     Cephalexin Cephalexin           No                       Cephalexin        

   



     250  MG                                    250 MG           

 

     Premarin Premarin           No                       Premarin           



     0.625 MG/GM 0.625 MG/GM                                    0.625           



                                                  MG/GM           

 

     Promethazin Promethazin           No             1{table QID  Promethazi   

        



     e HCl 25 MG e HCl 25 MG                          t_as_ne      ne HCl 25    

       



                                        eded}      MG             

 

     Furosemide Furosemide           No                       Furosemide        

   



     40 MG 40 MG                                    40 MG           

 

     Cephalexin Cephalexin           No             1{capsu QD   Cephalexin     

      



     250  MG                          le}       250 MG           

 

     Isosorbide Isosorbide           No                       Isosorbide        

   



     Dinitrate Dinitrate                                    Dinitrate           



     30 MG 30 MG                                    30 MG           

 

     Citalopram Citalopram           No             1{table QD   Citalopram     

      



     Hydrobromid Hydrobromid                          t}        Hydrobromi      

     



     e 20 MG e 20 MG                                    de 20 MG           

 

     Ferrous Ferrous           No             1{table TID  Ferrous           



     Sulfate 325 Sulfate 325                          t}        Sulfate         

  



     (65 Fe) MG (65 Fe) MG                                    325 (65           



                                                  Fe) MG           

 

     Cetirizine Cetirizine           No             1{table      Cetirizine     

      



     HCl 10 MG HCl 10 MG                          t}        HCl 10 MG           

 

     Cetirizine Cetirizine           No                       Cetirizine        

   



     HCl 10 MG HCl 10 MG                                    HCl 10 MG           

 

     Mirtazapine Mirtazapine           No             1{table QD   Mirtazapin   

        



     7.5 MG 7.5 MG                          t_at_be      e 7.5 MG           



                                        dtime}                     

 

     Trimethopri Trimethopri           No             1{table QD   Trimethopr   

        



     m 100 MG m 100 MG                          t}        im 100 MG           

 

     BD Pen BD Pen           No                       BD Pen           



     Needle Needle                                    Needle           



     Short U/F Short U/F                                    Short U/F           



     31G X 8 MM 31G X 8 MM                                    31G X 8 MM        

   

 

     Metoprolol Metoprolol           No             1{table QD   Metoprolol     

      



     Succinate Succinate                          t}        Succinate           



     ER 25 MG ER 25 MG                                    ER 25 MG           

 

     Flonase 50 Flonase 50           No             1{spray QD   Flonase 50     

      



     MCG/ACT MCG/ACT                          _in_eac      MCG/ACT           



                                        h_nostr                     



                                        il}                      

 

     Omeprazole Omeprazole           No             1{capsu QD   Omeprazole     

      



     40 MG 40 MG                          le}       40 MG           

 

     Aspir-81 81 Aspir-81 81           No             1{table QD   Aspir-81     

      



     MG   MG                            t}        81 MG           

 

     Clopidogrel Clopidogrel           No             1{table QD   Clopidogre   

        



     Bisulfate Bisulfate                          t}        l              



     75 MG 75 MG                                    Bisulfate           



                                                  75 MG           

 

     NovoFine NovoFine           No                       NovoFine           



     Plus 32G X Plus 32G X                                    Plus 32G X        

   



     4 MM 4 MM                                    4 MM           

 

     NovoLOG NovoLOG           No                       NovoLOG           



     FlexPen 100 FlexPen 100                                    FlexPen         

  



     UNIT/ML UNIT/ML                                    100            



                                                  UNIT/ML           

 

     Furosemide Furosemide           No             1{table QD   Furosemide     

      



     40 MG 40 MG                          t}        40 MG           

 

     Cozaar 25 Cozaar 25           No             1{table QD   Cozaar 25        

   



     MG   MG                            t}        MG             

 

     Nitroglycer Nitroglycer           No                       Nitroglyce      

     



     in 0.4 MG in 0.4 MG                                    rin 0.4 MG          

 

 

     Estradiol Estradiol           No                       Estradiol           



     0.1 MG/GM 0.1 MG/GM                                    0.1 MG/GM           

 

     Zocor 80 MG Zocor 80 MG           No                       Zocor 80        

   



                                                  MG             

 

     Atorvastati Atorvastati           No             1{table QD   Atorvastat   

        



     n Calcium n Calcium                          t}        in Calcium          

 



     40 MG 40 MG                                    40 MG           

 

     Levemir 100 Levemir 100           No                  QD   Levemir         

  



     UNIT/ML UNIT/ML                                    100            



                                                  UNIT/ML           

 

     Levemir Levemir           No                       Levemir           



     FlexTouch FlexTouch                                    FlexTouch           



     100 UNIT/ UNIT/ML                                    100            



                                                  UNIT/ML           

 

     BD Pen BD Pen           No                       BD Pen           



     Needle Needle                                    Needle           



     Short U/F Short U/F                                    Short U/F           



     31G X 8 MM 31G X 8 MM                                    31G X 8 MM        

   

 

     Cephalexin Cephalexin           No             1{capsu QD   Cephalexin     

      



     250  MG                          le}       250 MG           

 

     Trimethopri Trimethopri           No             1{table QD   Trimethopr   

        



     m 100 MG m 100 MG                          t}        im 100 MG           

 

     Clopidogrel Clopidogrel           No             1{table QD   Clopidogre   

        



     Bisulfate Bisulfate                          t}        l              



     75 MG 75 MG                                    Bisulfate           



                                                  75 MG           

 

     Ferrous Ferrous           No             1{table QD   Ferrous           



     Sulfate 325 Sulfate 325                          t}        Sulfate         

  



     (65 Fe) MG (65 Fe) MG                                    325 (65           



                                                  Fe) MG           

 

     NovoFine NovoFine           No                       NovoFine           



     Plus 32G X Plus 32G X                                    Plus 32G X        

   



     4 MM 4 MM                                    4 MM           

 

     Promethazin Promethazin           No             1{table QID  Promethazi   

        



     e HCl 25 MG e HCl 25 MG                          t_as_ne      ne HCl 25    

       



                                        eded}      MG             

 

     Estradiol Estradiol           No                       Estradiol           



     0.1 MG/GM 0.1 MG/GM                                    0.1 MG/GM           

 

     Aspir-81 81 Aspir-81 81           No             1{table QD   Aspir-81     

      



     MG   MG                            t}        81 MG           

 

     Furosemide Furosemide           No             1{table QD   Furosemide     

      



     40 MG 40 MG                          t}        40 MG           

 

     Mirtazapine Mirtazapine           No             1{table QD   Mirtazapin   

        



     7.5 MG 7.5 MG                          t_at_be      e 7.5 MG           



                                        dtime}                     

 

     Omeprazole Omeprazole           No             1{capsu QD   Omeprazole     

      



     40 MG 40 MG                          le}       40 MG           

 

     Premarin Premarin           No                       Premarin           



     0.625 MG/GM 0.625 MG/GM                                    0.625           



                                                  MG/GM           

 

     Cozaar 25 Cozaar 25           No             1{table QD   Cozaar 25        

   



     MG   MG                            t}        MG             

 

     Metoprolol Metoprolol           No             1{table BID  Metoprolol     

      



     Tartrate 25 Tartrate 25                          t_with_      Tartrate     

      



     MG   MG                            food}      25 MG           

 

     NovoFine NovoFine           No                       NovoFine           



     Plus 32G X Plus 32G X                                    Plus 32G X        

   



     4 MM 4 MM                                    4 MM           

 

     Metoprolol Metoprolol           No             1{table QD   Metoprolol     

      



     Succinate Succinate                          t}        Succinate           



     ER 25 MG ER 25 MG                                    ER 25 MG           

 

     Cetirizine Cetirizine           No             1{table      Cetirizine     

      



     HCl 10 MG HCl 10 MG                          t}        HCl 10 MG           

 

     Isosorbide Isosorbide           No             1{table QD   Isosorbide     

      



     Dinitrate Dinitrate                          t}        Dinitrate           



     30 MG 30 MG                                    30 MG           

 

     Ferrous Ferrous           No             1{table TID  Ferrous           



     Sulfate 325 Sulfate 325                          t}        Sulfate         

  



     (65 Fe) MG (65 Fe) MG                                    325 (65           



                                                  Fe) MG           

 

     Cetirizine Cetirizine           No                       Cetirizine        

   



     HCl 10 MG HCl 10 MG                                    HCl 10 MG           

 

     Isosorbide Isosorbide           No                       Isosorbide        

   



     Dinitrate Dinitrate                                    Dinitrate           



     30 MG 30 MG                                    30 MG           

 

     Flonase 50 Flonase 50           No             1{spray QD   Flonase 50     

      



     MCG/ACT MCG/ACT                          _in_eac      MCG/ACT           



                                        h_nostr                     



                                        il}                      

 

     Cephalexin Cephalexin           No                       Cephalexin        

   



     250  MG                                    250 MG           

 

     Levemir Levemir           No                       Levemir           



     FlexTouch FlexTouch                                    FlexTouch           



     100 UNIT/ UNIT/ML                                    100            



                                                  UNIT/ML           

 

     Zocor 80 MG Zocor 80 MG           No                       Zocor 80        

   



                                                  MG             

 

     Citalopram Citalopram           No             1{table QD   Citalopram     

      



     Hydrobromid Hydrobromid                          t}        Hydrobromi      

     



     e 20 MG e 20 MG                                    de 20 MG           

 

     Levemir 100 Levemir 100           No                  QD   Levemir         

  



     UNIT/ML UNIT/ML                                    100            



                                                  UNIT/ML           

 

     Nitroglycer Nitroglycer           No                       Nitroglyce      

     



     in 0.4 MG in 0.4 MG                                    rin 0.4 MG          

 

 

     Famotidine Famotidine           No             1{table QD   Famotidine     

      



     40 MG 40 MG                          t_as_ne      40 MG           



                                        eded}                     

 

     Atorvastati Atorvastati           No             1{table QD   Atorvastat   

        



     n Calcium n Calcium                          t}        in Calcium          

 



     40 MG 40 MG                                    40 MG           

 

     Tradjenta 5 Tradjenta 5           No                       Tradjenta       

    



     MG   MG                                      5 MG           

 

     Simvastatin Simvastatin           No                       Simvastati      

     



     80 MG 80 MG                                    n 80 MG           

 

     Furosemide Furosemide           No                       Furosemide        

   



     40 MG 40 MG                                    40 MG           

 

     NovoLOG NovoLOG           No                       NovoLOG           



     FlexPen 100 FlexPen 100                                    FlexPen         

  



     UNIT/ML UNIT/ML                                    100            



                                                  UNIT/ML           

 

     NovoFine NovoFine           No                       NovoFine           



     Plus 32G X Plus 32G X                                    Plus 32G X        

   



     4 MM 4 MM                                    4 MM           

 

     Trimethopri Trimethopri           No             1{table QD   Trimethopr   

        



     m 100 MG m 100 MG                          t}        im 100 MG           

 

     Aspir-81 81 Aspir-81 81           No             1{table QD   Aspir-81     

      



     MG   MG                            t}        81 MG           

 

     Cephalexin Cephalexin           No             1{capsu QD   Cephalexin     

      



     250  MG                          le}       250 MG           

 

     Ferrous Ferrous           No             1{table QD   Ferrous           



     Sulfate 325 Sulfate 325                          t}        Sulfate         

  



     (65 Fe) MG (65 Fe) MG                                    325 (65           



                                                  Fe) MG           

 

     Levemir Levemir           No                       Levemir           



     FlexTouch FlexTouch                                    FlexTouch           



     100 UNIT/ UNIT/ML                                    100            



                                                  UNIT/ML           

 

     Cozaar 25 Cozaar 25           No             1{table QD   Cozaar 25        

   



     MG   MG                            t}        MG             

 

     Estradiol Estradiol           No                       Estradiol           



     0.1 MG/GM 0.1 MG/GM                                    0.1 MG/GM           

 

     Premarin Premarin           No                       Premarin           



     0.625 MG/GM 0.625 MG/GM                                    0.625           



                                                  MG/GM           

 

     Promethazin Promethazin           No             1{table QID  Promethazi   

        



     e HCl 25 MG e HCl 25 MG                          t_as_ne      ne HCl 25    

       



                                        eded}      MG             

 

     Furosemide Furosemide           No             1{table QD   Furosemide     

      



     40 MG 40 MG                          t}        40 MG           

 

     Metoprolol Metoprolol           No             1{table QD   Metoprolol     

      



     Succinate Succinate                          t}        Succinate           



     ER 25 MG ER 25 MG                                    ER 25 MG           

 

     Omeprazole Omeprazole           No             1{capsu QD   Omeprazole     

      



     40 MG 40 MG                          le}       40 MG           

 

     Isosorbide Isosorbide           No                       Isosorbide        

   



     Dinitrate Dinitrate                                    Dinitrate           



     30 MG 30 MG                                    30 MG           

 

     Mirtazapine Mirtazapine           No             1{table QD   Mirtazapin   

        



     7.5 MG 7.5 MG                          t_at_be      e 7.5 MG           



                                        dtime}                     

 

     Metoprolol Metoprolol           No             1{table BID  Metoprolol     

      



     Tartrate 25 Tartrate 25                          t_with_      Tartrate     

      



     MG   MG                            food}      25 MG           

 

     Cetirizine Cetirizine           No                       Cetirizine        

   



     HCl 10 MG HCl 10 MG                                    HCl 10 MG           

 

     NovoFine NovoFine           No                       NovoFine           



     Plus 32G X Plus 32G X                                    Plus 32G X        

   



     4 MM 4 MM                                    4 MM           

 

     Cephalexin Cephalexin           No                       Cephalexin        

   



     250  MG                                    250 MG           

 

     Isosorbide Isosorbide           No             1{table QD   Isosorbide     

      



     Dinitrate Dinitrate                          t}        Dinitrate           



     30 MG 30 MG                                    30 MG           

 

     Levemir 100 Levemir 100           No                  QD   Levemir         

  



     UNIT/ML UNIT/ML                                    100            



                                                  UNIT/ML           

 

     Clopidogrel Clopidogrel           No             1{table QD   Clopidogre   

        



     Bisulfate Bisulfate                          t}        l              



     75 MG 75 MG                                    Bisulfate           



                                                  75 MG           

 

     Zocor 80 MG Zocor 80 MG           No                       Zocor 80        

   



                                                  MG             

 

     Famotidine Famotidine           No             1{table QD   Famotidine     

      



     40 MG 40 MG                          t_as_ne      40 MG           



                                        eded}                     

 

     Nitroglycer Nitroglycer           No                       Nitroglyce      

     



     in 0.4 MG in 0.4 MG                                    rin 0.4 MG          

 

 

     Atorvastati Atorvastati           No                       Atorvastat      

     



     n Calcium n Calcium                                    in Calcium          

 



     40 MG 40 MG                                    40 MG           

 

     Citalopram Citalopram           No             1{table QD   Citalopram     

      



     Hydrobromid Hydrobromid                          t}        Hydrobromi      

     



     e 20 MG e 20 MG                                    de 20 MG           

 

     Flonase 50 Flonase 50           No             1{spray QD   Flonase 50     

      



     MCG/ACT MCG/ACT                          _in_eac      MCG/ACT           



                                        h_nostr                     



                                        il}                      

 

     BD Pen BD Pen           No                       BD Pen           



     Needle Needle                                    Needle           



     Short U/F Short U/F                                    Short U/F           



     31G X 8 MM 31G X 8 MM                                    31G X 8 MM        

   

 

     Ferrous Ferrous           No             1{table TID  Ferrous           



     Sulfate 325 Sulfate 325                          t}        Sulfate         

  



     (65 Fe) MG (65 Fe) MG                                    325 (65           



                                                  Fe) MG           

 

     Tradjenta 5 Tradjenta 5           No                       Tradjenta       

    



     MG   MG                                      5 MG           

 

     Simvastatin Simvastatin           No                       Simvastati      

     



     80 MG 80 MG                                    n 80 MG           

 

     Furosemide Furosemide           No                       Furosemide        

   



     40 MG 40 MG                                    40 MG           

 

     NovoLOG NovoLOG           No                       NovoLOG           



     FlexPen 100 FlexPen 100                                    FlexPen         

  



     UNIT/ML UNIT/ML                                    100            



                                                  UNIT/ML           

 

     Isosorbide Isosorbide           No                       Isosorbide        

   



     Dinitrate Dinitrate                                    Dinitrate           



     30 MG 30 MG                                    30 MG           

 

     Metoprolol Metoprolol           No             1{table BID  Metoprolol     

      



     Tartrate 25 Tartrate 25                          t_with_      Tartrate     

      



     MG   MG                            food}      25 MG           

 

     Cephalexin Cephalexin           No             1{capsu QD   Cephalexin     

      



     250  MG                          le}       250 MG           

 

     Ferrous Ferrous           No             1{table QD   Ferrous           



     Sulfate 325 Sulfate 325                          t}        Sulfate         

  



     (65 Fe) MG (65 Fe) MG                                    325 (65           



                                                  Fe) MG           

 

     NovoLOG NovoLOG           No                       NovoLOG           



     FlexPen 100 FlexPen 100                                    FlexPen         

  



     UNIT/ML UNIT/ML                                    100            



                                                  UNIT/ML           

 

     Omeprazole Omeprazole           No             1{capsu QD   Omeprazole     

      



     40 MG 40 MG                          le}       40 MG           

 

     Citalopram Citalopram           No             1{table QD   Citalopram     

      



     Hydrobromid Hydrobromid                          t}        Hydrobromi      

     



     e 10 MG e 10 MG                                    de 10 MG           

 

     Zocor 80 MG Zocor 80 MG           No                       Zocor 80        

   



                                                  MG             

 

     NovoFine NovoFine           No                       NovoFine           



     Plus 32G X Plus 32G X                                    Plus 32G X        

   



     4 MM 4 MM                                    4 MM           

 

     NovoFine NovoFine           No                       NovoFine           



     Plus 32G X Plus 32G X                                    Plus 32G X        

   



     4 MM 4 MM                                    4 MM           

 

     Levemir 100 Levemir 100           No                  QD   Levemir         

  



     UNIT/ML UNIT/ML                                    100            



                                                  UNIT/ML           

 

     Metoprolol Metoprolol           No             1{table QD   Metoprolol     

      



     Succinate Succinate                          t}        Succinate           



     ER 25 MG ER 25 MG                                    ER 25 MG           

 

     Mirtazapine Mirtazapine           No             1{table QD   Mirtazapin   

        



     7.5 MG 7.5 MG                          t_at_be      e 7.5 MG           



                                        dtime}                     

 

     Levemir Levemir           No                       Levemir           



     FlexTouch FlexTouch                                    FlexTouch           



     100 UNIT/ UNIT/ML                                    100            



                                                  UNIT/ML           

 

     Isosorbide Isosorbide           No             1{table QD   Isosorbide     

      



     Dinitrate Dinitrate                          t}        Dinitrate           



     30 MG 30 MG                                    30 MG           

 

     Clopidogrel Clopidogrel           No             1{table QD   Clopidogre   

        



     Bisulfate Bisulfate                          t}        l              



     75 MG 75 MG                                    Bisulfate           



                                                  75 MG           

 

     Tradjenta 5 Tradjenta 5           No                       Tradjenta       

    



     MG   MG                                      5 MG           

 

     Nitroglycer Nitroglycer           No                       Nitroglyce      

     



     in 0.4 MG in 0.4 MG                                    rin 0.4 MG          

 

 

     Cephalexin Cephalexin           No                       Cephalexin        

   



     250  MG                                    250 MG           

 

     Aspir-81 81 Aspir-81 81           No             1{table QD   Aspir-81     

      



     MG   MG                            t}        81 MG           

 

     Estradiol Estradiol           No                       Estradiol           



     0.1 MG/GM 0.1 MG/GM                                    0.1 MG/GM           

 

     BD Pen BD Pen           No                       BD Pen           



     Needle Needle                                    Needle           



     Short U/F Short U/F                                    Short U/F           



     31G X 8 MM 31G X 8 MM                                    31G X 8 MM        

   

 

     Furosemide Furosemide           No             1{table QD   Furosemide     

      



     40 MG 40 MG                          t}        40 MG           

 

     Trimethopri Trimethopri           No             1{table QD   Trimethopr   

        



     m 100 MG m 100 MG                          t}        im 100 MG           

 

     Simvastatin Simvastatin           No                       Simvastati      

     



     80 MG 80 MG                                    n 80 MG           

 

     Promethazin Promethazin           No             1{table QID  Promethazi   

        



     e HCl 25 MG e HCl 25 MG                          t_as_ne      ne HCl 25    

       



                                        eded}      MG             

 

     Cozaar 25 Cozaar 25           No             1{table QD   Cozaar 25        

   



     MG   MG                            t}        MG             

 

     Famotidine Famotidine           No             1{table QD   Famotidine     

      



     40 MG 40 MG                          t_as_ne      40 MG           



                                        eded}                     

 

     Furosemide Furosemide           No                       Furosemide        

   



     40 MG 40 MG                                    40 MG           

 

     Premarin Premarin           No                       Premarin           



     0.625 MG/GM 0.625 MG/GM                                    0.625           



                                                  MG/GM           

 

     Flonase 50 Flonase 50           No             1{spray QD   Flonase 50     

      



     MCG/ACT MCG/ACT                          _in_eac      MCG/ACT           



                                        h_nostr                     



                                        il}                      

 

     Ferrous Ferrous           No             1{table TID  Ferrous           



     Sulfate 325 Sulfate 325                          t}        Sulfate         

  



     (65 Fe) MG (65 Fe) MG                                    325 (65           



                                                  Fe) MG           

 

     Cetirizine Cetirizine           No                       Cetirizine        

   



     HCl 10 MG HCl 10 MG                                    HCl 10 MG           

 

     Atorvastati Atorvastati           No                       Atorvastat      

     



     n Calcium n Calcium                                    in Calcium          

 



     40 MG 40 MG                                    40 MG           

 

     Levemir 100 Levemir 100           No                  QD   Levemir         

  



     UNIT/ML UNIT/ML                                    100            



                                                  UNIT/ML           

 

     Citalopram Citalopram           No                       Citalopram        

   



     Hydrobromid Hydrobromid                                    Hydrobromi      

     



     e 20 MG e 20 MG                                    de 20 MG           

 

     Trimethopri Trimethopri           No             1{table QD   Trimethopr   

        



     m 100 MG m 100 MG                          t}        im 100 MG           

 

     Promethazin Promethazin           No             1{table QID  Promethazi   

        



     e HCl 25 MG e HCl 25 MG                          t_as_ne      ne HCl 25    

       



                                        eded}      MG             

 

     Famotidine Famotidine           No             1{table QD   Famotidine     

      



     40 MG 40 MG                          t_as_ne      40 MG           



                                        eded}                     

 

     NovoLOG NovoLOG           No                       NovoLOG           



     FlexPen 100 FlexPen 100                                    FlexPen         

  



     UNIT/ML UNIT/ML                                    100            



                                                  UNIT/ML           

 

     Levemir Levemir           No                       Levemir           



     FlexTouch FlexTouch                                    FlexTouch           



     100 UNIT/ UNIT/ML                                    100            



                                                  UNIT/ML           

 

     Estradiol Estradiol           No                       Estradiol           



     0.1 MG/GM 0.1 MG/GM                                    0.1 MG/GM           

 

     Zocor 80 MG Zocor 80 MG           No                       Zocor 80        

   



                                                  MG             

 

     Isosorbide Isosorbide           No             1{table QD   Isosorbide     

      



     Dinitrate Dinitrate                          t}        Dinitrate           



     30 MG 30 MG                                    30 MG           

 

     Ferrous Ferrous           No             1{table TID  Ferrous           



     Sulfate 325 Sulfate 325                          t}        Sulfate         

  



     (65 Fe) MG (65 Fe) MG                                    325 (65           



                                                  Fe) MG           

 

     Omeprazole Omeprazole           No             1{capsu QD   Omeprazole     

      



     40 MG 40 MG                          le}       40 MG           

 

     Isosorbide Isosorbide           No                       Isosorbide        

   



     Dinitrate Dinitrate                                    Dinitrate           



     30 MG 30 MG                                    30 MG           

 

     BD Pen BD Pen           No                       BD Pen           



     Needle Needle                                    Needle           



     Short U/F Short U/F                                    Short U/F           



     31G X 8 MM 31G X 8 MM                                    31G X 8 MM        

   

 

     Flonase 50 Flonase 50           No             1{spray QD   Flonase 50     

      



     MCG/ACT MCG/ACT                          _in_eac      MCG/ACT           



                                        h_nostr                     



                                        il}                      

 

     Cephalexin Cephalexin           No             1{capsu QD   Cephalexin     

      



     250  MG                          le}       250 MG           

 

     Aspir-81 81 Aspir-81 81           No             1{table QD   Aspir-81     

      



     MG   MG                            t}        81 MG           

 

     Metoprolol Metoprolol           No             1{table BID  Metoprolol     

      



     Tartrate 25 Tartrate 25                          t_with_      Tartrate     

      



     MG   MG                            food}      25 MG           

 

     Mirtazapine Mirtazapine           No             1{table QD   Mirtazapin   

        



     7.5 MG 7.5 MG                          t_at_be      e 7.5 MG           



                                        dtime}                     

 

     Premarin Premarin           No                       Premarin           



     0.625 MG/GM 0.625 MG/GM                                    0.625           



                                                  MG/GM           

 

     Ferrous Ferrous           No             1{table QD   Ferrous           



     Sulfate 325 Sulfate 325                          t}        Sulfate         

  



     (65 Fe) MG (65 Fe) MG                                    325 (65           



                                                  Fe) MG           

 

     Cozaar 25 Cozaar 25           No             1{table QD   Cozaar 25        

   



     MG   MG                            t}        MG             

 

     Cetirizine Cetirizine           No                       Cetirizine        

   



     HCl 10 MG HCl 10 MG                                    HCl 10 MG           

 

     Tradjenta 5 Tradjenta 5           No                       Tradjenta       

    



     MG   MG                                      5 MG           

 

     Metoprolol Metoprolol           No             1{table QD   Metoprolol     

      



     Succinate Succinate                          t}        Succinate           



     ER 25 MG ER 25 MG                                    ER 25 MG           

 

     NovoFine NovoFine           No                       NovoFine           



     Plus 32G X Plus 32G X                                    Plus 32G X        

   



     4 MM 4 MM                                    4 MM           

 

     NovoFine NovoFine           No                       NovoFine           



     Plus 32G X Plus 32G X                                    Plus 32G X        

   



     4 MM 4 MM                                    4 MM           

 

     Furosemide Furosemide           No             1{table QD   Furosemide     

      



     40 MG 40 MG                          t}        40 MG           

 

     Atorvastati Atorvastati           No                       Atorvastat      

     



     n Calcium n Calcium                                    in Calcium          

 



     40 MG 40 MG                                    40 MG           

 

     Cephalexin Cephalexin           No                       Cephalexin        

   



     250  MG                                    250 MG           

 

     Clopidogrel Clopidogrel           No             1{table QD   Clopidogre   

        



     Bisulfate Bisulfate                          t}        l              



     75 MG 75 MG                                    Bisulfate           



                                                  75 MG           

 

     Simvastatin Simvastatin           No                       Simvastati      

     



     80 MG 80 MG                                    n 80 MG           

 

     Furosemide Furosemide           No                       Furosemide        

   



     40 MG 40 MG                                    40 MG           

 

     Nitroglycer Nitroglycer           No                       Nitroglyce      

     



     in 0.4 MG in 0.4 MG                                    rin 0.4 MG          

 

 

     Atorvastati Atorvastati           No                       Atorvastat      

     



     n Calcium n Calcium                                    in Calcium          

 



     40 MG 40 MG                                    40 MG           

 

     Promethazin Promethazin           No             1{table QID  Promethazi   

        



     e HCl 25 MG e HCl 25 MG                          t_as_ne      ne HCl 25    

       



                                        eded}      MG             

 

     Citalopram Citalopram           No             1{table QD   Citalopram     

      



     Hydrobromid Hydrobromid                          t}        Hydrobromi      

     



     e 20 MG e 20 MG                                    de 20 MG           

 

     NovoLOG NovoLOG           No                       NovoLOG           



     FlexPen 100 FlexPen 100                                    FlexPen         

  



     UNIT/ML UNIT/ML                                    100            



                                                  UNIT/ML           

 

     Tradjenta 5 Tradjenta 5           No                       Tradjenta       

    



     MG   MG                                      5 MG           

 

     Famotidine Famotidine           No             1{table QD   Famotidine     

      



     40 MG 40 MG                          t_as_ne      40 MG           



                                        eded}                     

 

     Levemir 100 Levemir 100           No                  QD   Levemir         

  



     UNIT/ML UNIT/ML                                    100            



                                                  UNIT/ML           

 

     Levemir Levemir           No                       Levemir           



     FlexTouch FlexTouch                                    FlexTouch           



     100 UNIT/ UNIT/ML                                    100            



                                                  UNIT/ML           

 

     Simvastatin Simvastatin           No                       Simvastati      

     



     80 MG 80 MG                                    n 80 MG           

 

     Metoprolol Metoprolol           No             1{table QD   Metoprolol     

      



     Succinate Succinate                          t}        Succinate           



     ER 25 MG ER 25 MG                                    ER 25 MG           

 

     NovoFine NovoFine           No                       NovoFine           



     Plus 32G X Plus 32G X                                    Plus 32G X        

   



     4 MM 4 MM                                    4 MM           

 

     Clopidogrel Clopidogrel           No             1{table QD   Clopidogre   

        



     Bisulfate Bisulfate                          t}        l              



     75 MG 75 MG                                    Bisulfate           



                                                  75 MG           

 

     Mirtazapine Mirtazapine           No             1{table QD   Mirtazapin   

        



     7.5 MG 7.5 MG                          t_at_be      e 7.5 MG           



                                        dtime}                     

 

     Trimethopri Trimethopri           No             1{table QD   Trimethopr   

        



     m 100 MG m 100 MG                          t}        im 100 MG           

 

     Furosemide Furosemide           No                       Furosemide        

   



     40 MG 40 MG                                    40 MG           

 

     Cetirizine Cetirizine           No                       Cetirizine        

   



     HCl 10 MG HCl 10 MG                                    HCl 10 MG           

 

     Estradiol Estradiol           No                       Estradiol           



     0.1 MG/GM 0.1 MG/GM                                    0.1 MG/GM           

 

     BD Pen BD Pen           No                       BD Pen           



     Needle Needle                                    Needle           



     Short U/F Short U/F                                    Short U/F           



     31G X 8 MM 31G X 8 MM                                    31G X 8 MM        

   

 

     NovoFine NovoFine           No                       NovoFine           



     Plus 32G X Plus 32G X                                    Plus 32G X        

   



     4 MM 4 MM                                    4 MM           

 

     Ferrous Ferrous           No             1{table TID  Ferrous           



     Sulfate 325 Sulfate 325                          t}        Sulfate         

  



     (65 Fe) MG (65 Fe) MG                                    325 (65           



                                                  Fe) MG           

 

     Premarin Premarin           No                       Premarin           



     0.625 MG/GM 0.625 MG/GM                                    0.625           



                                                  MG/GM           

 

     Citalopram Citalopram           No                       Citalopram        

   



     Hydrobromid Hydrobromid                                    Hydrobromi      

     



     e 20 MG e 20 MG                                    de 20 MG           

 

     Aspir-81 81 Aspir-81 81           No             1{table QD   Aspir-81     

      



     MG   MG                            t}        81 MG           

 

     Nitroglycer Nitroglycer           No                       Nitroglyce      

     



     in 0.4 MG in 0.4 MG                                    rin 0.4 MG          

 

 

     Isosorbide Isosorbide           No             1{table QD   Isosorbide     

      



     Dinitrate Dinitrate                          t}        Dinitrate           



     30 MG 30 MG                                    30 MG           

 

     Cetirizine Cetirizine           No             1{table      Cetirizine     

      



     HCl 10 MG HCl 10 MG                          t}        HCl 10 MG           

 

     Furosemide Furosemide           No             1{table QD   Furosemide     

      



     40 MG 40 MG                          t}        40 MG           

 

     Cozaar 25 Cozaar 25           No             1{table QD   Cozaar 25        

   



     MG   MG                            t}        MG             

 

     Omeprazole Omeprazole           No             1{capsu QD   Omeprazole     

      



     40 MG 40 MG                          le}       40 MG           

 

     Flonase 50 Flonase 50           No             1{spray QD   Flonase 50     

      



     MCG/ACT MCG/ACT                          _in_eac      MCG/ACT           



                                        h_nostr                     



                                        il}                      

 

     Zocor 80 MG Zocor 80 MG           No                       Zocor 80        

   



                                                  MG             

 

     Atorvastati Atorvastati           No             1{table QD   Atorvastat   

        



     n Calcium n Calcium                          t}        in Calcium          

 



     40 MG 40 MG                                    40 MG           

 

     Ferrous Ferrous           No             1{table QD   Ferrous           



     Sulfate 325 Sulfate 325                          t}        Sulfate         

  



     (65 Fe) MG (65 Fe) MG                                    325 (65           



                                                  Fe) MG           

 

     Metoprolol Metoprolol           No             1{table BID  Metoprolol     

      



     Tartrate 25 Tartrate 25                          t_with_      Tartrate     

      



     MG   MG                            food}      25 MG           

 

     Cephalexin Cephalexin           No             1{capsu QD   Cephalexin     

      



     250  MG                          le}       250 MG           

 

     Cephalexin Cephalexin           No                       Cephalexin        

   



     250  MG                                    250 MG           

 

     Isosorbide Isosorbide           No                       Isosorbide        

   



     Dinitrate Dinitrate                                    Dinitrate           



     30 MG 30 MG                                    30 MG           

 

     Eliquis 2.5 Eliquis 2.5           No                       Eliquis         

  



     mg 2.5 mg mg 2.5 mg                                    2.5 mg 2.5          

 



                                                  mg             

 

     Atorvastati Atorvastati           No                       Atorvastat      

     



     n Calcium n Calcium                                    in Calcium          

 



     40 MG 40 MG                                    40 MG           

 

     Promethazin Promethazin           No             1{table QID  Promethazi   

        



     e HCl 25 MG e HCl 25 MG                          t_as_ne      ne HCl 25    

       



                                        eded}      MG             

 

     Citalopram Citalopram           No             1{table QD   Citalopram     

      



     Hydrobromid Hydrobromid                          t}        Hydrobromi      

     



     e 20 MG e 20 MG                                    de 20 MG           

 

     NovoLOG NovoLOG           No                       NovoLOG           



     FlexPen 100 FlexPen 100                                    FlexPen         

  



     UNIT/ML UNIT/ML                                    100            



                                                  UNIT/ML           

 

     Tradjenta 5 Tradjenta 5           No                       Tradjenta       

    



     MG   MG                                      5 MG           

 

     Famotidine Famotidine           No             1{table QD   Famotidine     

      



     40 MG 40 MG                          t_as_ne      40 MG           



                                        eded}                     

 

     Levemir 100 Levemir 100           No                  QD   Levemir         

  



     UNIT/ML UNIT/ML                                    100            



                                                  UNIT/ML           

 

     Levemir Levemir           No                       Levemir           



     FlexTouch FlexTouch                                    FlexTouch           



     100 UNIT/ UNIT/ML                                    100            



                                                  UNIT/ML           

 

     Simvastatin Simvastatin           No                       Simvastati      

     



     80 MG 80 MG                                    n 80 MG           

 

     Metoprolol Metoprolol           No             1{table QD   Metoprolol     

      



     Succinate Succinate                          t}        Succinate           



     ER 25 MG ER 25 MG                                    ER 25 MG           

 

     NovoFine NovoFine           No                       NovoFine           



     Plus 32G X Plus 32G X                                    Plus 32G X        

   



     4 MM 4 MM                                    4 MM           

 

     Clopidogrel Clopidogrel           No             1{table QD   Clopidogre   

        



     Bisulfate Bisulfate                          t}        l              



     75 MG 75 MG                                    Bisulfate           



                                                  75 MG           

 

     Mirtazapine Mirtazapine           No             1{table QD   Mirtazapin   

        



     7.5 MG 7.5 MG                          t_at_be      e 7.5 MG           



                                        dtime}                     

 

     Trimethopri Trimethopri           No             1{table QD   Trimethopr   

        



     m 100 MG m 100 MG                          t}        im 100 MG           

 

     Furosemide Furosemide           No                       Furosemide        

   



     40 MG 40 MG                                    40 MG           

 

     Cetirizine Cetirizine           No                       Cetirizine        

   



     HCl 10 MG HCl 10 MG                                    HCl 10 MG           

 

     Estradiol Estradiol           No                       Estradiol           



     0.1 MG/GM 0.1 MG/GM                                    0.1 MG/GM           

 

     BD Pen BD Pen           No                       BD Pen           



     Needle Needle                                    Needle           



     Short U/F Short U/F                                    Short U/F           



     31G X 8 MM 31G X 8 MM                                    31G X 8 MM        

   

 

     NovoFine NovoFine           No                       NovoFine           



     Plus 32G X Plus 32G X                                    Plus 32G X        

   



     4 MM 4 MM                                    4 MM           

 

     Ferrous Ferrous           No             1{table TID  Ferrous           



     Sulfate 325 Sulfate 325                          t}        Sulfate         

  



     (65 Fe) MG (65 Fe) MG                                    325 (65           



                                                  Fe) MG           

 

     Premarin Premarin           No                       Premarin           



     0.625 MG/GM 0.625 MG/GM                                    0.625           



                                                  MG/GM           

 

     Citalopram Citalopram           No                       Citalopram        

   



     Hydrobromid Hydrobromid                                    Hydrobromi      

     



     e 20 MG e 20 MG                                    de 20 MG           

 

     Aspir-81 81 Aspir-81 81           No             1{table QD   Aspir-81     

      



     MG   MG                            t}        81 MG           

 

     Nitroglycer Nitroglycer           No                       Nitroglyce      

     



     in 0.4 MG in 0.4 MG                                    rin 0.4 MG          

 

 

     Isosorbide Isosorbide           No             1{table QD   Isosorbide     

      



     Dinitrate Dinitrate                          t}        Dinitrate           



     30 MG 30 MG                                    30 MG           

 

     Cetirizine Cetirizine           No             1{table      Cetirizine     

      



     HCl 10 MG HCl 10 MG                          t}        HCl 10 MG           

 

     Furosemide Furosemide           No             1{table QD   Furosemide     

      



     40 MG 40 MG                          t}        40 MG           

 

     Cozaar 25 Cozaar 25           No             1{table QD   Cozaar 25        

   



     MG   MG                            t}        MG             

 

     Omeprazole Omeprazole           No             1{capsu QD   Omeprazole     

      



     40 MG 40 MG                          le}       40 MG           

 

     Flonase 50 Flonase 50           No             1{spray QD   Flonase 50     

      



     MCG/ACT MCG/ACT                          _in_eac      MCG/ACT           



                                        h_nostr                     



                                        il}                      

 

     Zocor 80 MG Zocor 80 MG           No                       Zocor 80        

   



                                                  MG             

 

     Atorvastati Atorvastati           No             1{table QD   Atorvastat   

        



     n Calcium n Calcium                          t}        in Calcium          

 



     40 MG 40 MG                                    40 MG           

 

     Ferrous Ferrous           No             1{table QD   Ferrous           



     Sulfate 325 Sulfate 325                          t}        Sulfate         

  



     (65 Fe) MG (65 Fe) MG                                    325 (65           



                                                  Fe) MG           

 

     Metoprolol Metoprolol           No             1{table BID  Metoprolol     

      



     Tartrate 25 Tartrate 25                          t_with_      Tartrate     

      



     MG   MG                            food}      25 MG           

 

     Cephalexin Cephalexin           No             1{capsu QD   Cephalexin     

      



     250  MG                          le}       250 MG           

 

     Cephalexin Cephalexin           No                       Cephalexin        

   



     250  MG                                    250 MG           

 

     Isosorbide Isosorbide           No                       Isosorbide        

   



     Dinitrate Dinitrate                                    Dinitrate           



     30 MG 30 MG                                    30 MG           

 

     Eliquis 2.5 Eliquis 2.5           No                       Eliquis         

  



     mg 2.5 mg mg 2.5 mg                                    2.5 mg 2.5          

 



                                                  mg             

 

     Cetirizine Cetirizine           No             1{table      Cetirizine     

      



     HCl 10 MG HCl 10 MG                          t}        HCl 10 MG           

 

     Cephalexin Cephalexin           No             1{capsu QD   Cephalexin     

      



     250  MG                          le}       250 MG           

 

     Zocor 80 MG Zocor 80 MG           No                       Zocor 80        

   



                                                  MG             

 

     Mirtazapine Mirtazapine           No             1{table QD   Mirtazapin   

        



     7.5 MG 7.5 MG                          t_at_be      e 7.5 MG           



                                        dtime}                     

 

     Trimethopri Trimethopri           No             1{table QD   Trimethopr   

        



     m 100 MG m 100 MG                          t}        im 100 MG           

 

     Aspir-81 81 Aspir-81 81           No             1{table QD   Aspir-81     

      



     MG   MG                            t}        81 MG           

 

     Clopidogrel Clopidogrel           No             1{table QD   Clopidogre   

        



     Bisulfate Bisulfate                          t}        l              



     75 MG 75 MG                                    Bisulfate           



                                                  75 MG           

 

     Promethazin Promethazin           No             1{table QID  Promethazi   

        



     e HCl 25 MG e HCl 25 MG                          t_as_ne      ne HCl 25    

       



                                        eded}      MG             

 

     Cozaar 25 Cozaar 25           No             1{table QD   Cozaar 25        

   



     MG   MG                            t}        MG             

 

     Atorvastati Atorvastati           No                       Atorvastat      

     



     n Calcium n Calcium                                    in Calcium          

 



     40 MG 40 MG                                    40 MG           

 

     Levemir Levemir           No                       Levemir           



     FlexTouch FlexTouch                                    FlexTouch           



     100 UNIT/ UNIT/ML                                    100            



                                                  UNIT/ML           

 

     NovoFine NovoFine           No                       NovoFine           



     Plus 32G X Plus 32G X                                    Plus 32G X        

   



     4 MM 4 MM                                    4 MM           

 

     Eliquis 2.5 Eliquis 2.5           No                       Eliquis         

  



     mg 2.5 mg mg 2.5 mg                                    2.5 mg 2.5          

 



                                                  mg             

 

     Nitroglycer Nitroglycer           No                       Nitroglyce      

     



     in 0.4 MG in 0.4 MG                                    rin 0.4 MG          

 

 

     NovoFine NovoFine           No                       NovoFine           



     Plus 32G X Plus 32G X                                    Plus 32G X        

   



     4 MM 4 MM                                    4 MM           

 

     Cetirizine Cetirizine           No                       Cetirizine        

   



     HCl 10 MG HCl 10 MG                                    HCl 10 MG           

 

     Metoprolol Metoprolol           No             1{table QD   Metoprolol     

      



     Succinate Succinate                          t}        Succinate           



     ER 25 MG ER 25 MG                                    ER 25 MG           

 

     Isosorbide Isosorbide           No                       Isosorbide        

   



     Dinitrate Dinitrate                                    Dinitrate           



     30 MG 30 MG                                    30 MG           

 

     Citalopram Citalopram           No                       Citalopram        

   



     Hydrobromid Hydrobromid                                    Hydrobromi      

     



     e 20 MG e 20 MG                                    de 20 MG           

 

     Famotidine Famotidine           No                       Famotidine        

   



     40 MG 40 MG                                    40 MG           

 

     Furosemide Furosemide           No                       Furosemide        

   



     40 MG 40 MG                                    40 MG           

 

     Simvastatin Simvastatin           No                       Simvastati      

     



     80 MG 80 MG                                    n 80 MG           

 

     Levemir 100 Levemir 100           No                  QD   Levemir         

  



     UNIT/ML UNIT/ML                                    100            



                                                  UNIT/ML           

 

     Citalopram Citalopram           No             1{table QD   Citalopram     

      



     Hydrobromid Hydrobromid                          t}        Hydrobromi      

     



     e 20 MG e 20 MG                                    de 20 MG           

 

     Furosemide Furosemide           No             1{table QD   Furosemide     

      



     40 MG 40 MG                          t}        40 MG           

 

     Flonase 50 Flonase 50           No             1{spray QD   Flonase 50     

      



     MCG/ACT MCG/ACT                          _in_eac      MCG/ACT           



                                        h_nostr                     



                                        il}                      

 

     Metoprolol Metoprolol           No             1{table BID  Metoprolol     

      



     Tartrate 25 Tartrate 25                          t_with_      Tartrate     

      



     MG   MG                            food}      25 MG           

 

     Ferrous Ferrous           No             1{table TID  Ferrous           



     Sulfate 325 Sulfate 325                          t}        Sulfate         

  



     (65 Fe) MG (65 Fe) MG                                    325 (65           



                                                  Fe) MG           

 

     Mupirocin 2 Mupirocin 2           No                  QD   Mupirocin       

    



     %    %                                       2 %            

 

     Atorvastati Atorvastati           No             1{table QD   Atorvastat   

        



     n Calcium n Calcium                          t}        in Calcium          

 



     40 MG 40 MG                                    40 MG           

 

     Estradiol Estradiol           No                       Estradiol           



     0.1 MG/GM 0.1 MG/GM                                    0.1 MG/GM           

 

     Cephalexin Cephalexin           No                       Cephalexin        

   



     250  MG                                    250 MG           

 

     NovoLOG NovoLOG           No                       NovoLOG           



     FlexPen 100 FlexPen 100                                    FlexPen         

  



     UNIT/ML UNIT/ML                                    100            



                                                  UNIT/ML           

 

     Omeprazole Omeprazole           No             1{capsu QD   Omeprazole     

      



     40 MG 40 MG                          le}       40 MG           

 

     Tradjenta 5 Tradjenta 5           No                       Tradjenta       

    



     MG   MG                                      5 MG           

 

     BD Pen BD Pen           No                       BD Pen           



     Needle Needle                                    Needle           



     Short U/F Short U/F                                    Short U/F           



     31G X 8 MM 31G X 8 MM                                    31G X 8 MM        

   

 

     Isosorbide Isosorbide           No             1{table QD   Isosorbide     

      



     Dinitrate Dinitrate                          t}        Dinitrate           



     30 MG 30 MG                                    30 MG           

 

     Premarin Premarin           No                       Premarin           



     0.625 MG/GM 0.625 MG/GM                                    0.625           



                                                  MG/GM           

 

     Cetirizine Cetirizine           No             1{table      Cetirizine     

      



     HCl 10 MG HCl 10 MG                          t}        HCl 10 MG           

 

     Cephalexin Cephalexin           No             1{capsu QD   Cephalexin     

      



     250  MG                          le}       250 MG           

 

     Zocor 80 MG Zocor 80 MG           No                       Zocor 80        

   



                                                  MG             

 

     Mirtazapine Mirtazapine           No             1{table QD   Mirtazapin   

        



     7.5 MG 7.5 MG                          t_at_be      e 7.5 MG           



                                        dtime}                     

 

     Trimethopri Trimethopri           No             1{table QD   Trimethopr   

        



     m 100 MG m 100 MG                          t}        im 100 MG           

 

     Aspir-81 81 Aspir-81 81           No             1{table QD   Aspir-81     

      



     MG   MG                            t}        81 MG           

 

     Clopidogrel Clopidogrel           No             1{table QD   Clopidogre   

        



     Bisulfate Bisulfate                          t}        l              



     75 MG 75 MG                                    Bisulfate           



                                                  75 MG           

 

     Promethazin Promethazin           No             1{table QID  Promethazi   

        



     e HCl 25 MG e HCl 25 MG                          t_as_ne      ne HCl 25    

       



                                        eded}      MG             

 

     Cozaar 25 Cozaar 25           No             1{table QD   Cozaar 25        

   



     MG   MG                            t}        MG             

 

     Atorvastati Atorvastati           No                       Atorvastat      

     



     n Calcium n Calcium                                    in Calcium          

 



     40 MG 40 MG                                    40 MG           

 

     Levemir Levemir           No                       Levemir           



     FlexTouch FlexTouch                                    FlexTouch           



     100 UNIT/ UNIT/ML                                    100            



                                                  UNIT/ML           

 

     NovoFine NovoFine           No                       NovoFine           



     Plus 32G X Plus 32G X                                    Plus 32G X        

   



     4 MM 4 MM                                    4 MM           

 

     Eliquis 2.5 Eliquis 2.5           No                       Eliquis         

  



     mg 2.5 mg mg 2.5 mg                                    2.5 mg 2.5          

 



                                                  mg             

 

     Nitroglycer Nitroglycer           No                       Nitroglyce      

     



     in 0.4 MG in 0.4 MG                                    rin 0.4 MG          

 

 

     NovoFine NovoFine           No                       NovoFine           



     Plus 32G X Plus 32G X                                    Plus 32G X        

   



     4 MM 4 MM                                    4 MM           

 

     Cetirizine Cetirizine           No                       Cetirizine        

   



     HCl 10 MG HCl 10 MG                                    HCl 10 MG           

 

     Metoprolol Metoprolol           No             1{table QD   Metoprolol     

      



     Succinate Succinate                          t}        Succinate           



     ER 25 MG ER 25 MG                                    ER 25 MG           

 

     Isosorbide Isosorbide           No                       Isosorbide        

   



     Dinitrate Dinitrate                                    Dinitrate           



     30 MG 30 MG                                    30 MG           

 

     Citalopram Citalopram           No                       Citalopram        

   



     Hydrobromid Hydrobromid                                    Hydrobromi      

     



     e 20 MG e 20 MG                                    de 20 MG           

 

     Famotidine Famotidine           No                       Famotidine        

   



     40 MG 40 MG                                    40 MG           

 

     Furosemide Furosemide           No                       Furosemide        

   



     40 MG 40 MG                                    40 MG           

 

     Simvastatin Simvastatin           No                       Simvastati      

     



     80 MG 80 MG                                    n 80 MG           

 

     Levemir 100 Levemir 100           No                  QD   Levemir         

  



     UNIT/ML UNIT/ML                                    100            



                                                  UNIT/ML           

 

     Citalopram Citalopram           No             1{table QD   Citalopram     

      



     Hydrobromid Hydrobromid                          t}        Hydrobromi      

     



     e 20 MG e 20 MG                                    de 20 MG           

 

     Furosemide Furosemide           No             1{table QD   Furosemide     

      



     40 MG 40 MG                          t}        40 MG           

 

     Flonase 50 Flonase 50           No             1{spray QD   Flonase 50     

      



     MCG/ACT MCG/ACT                          _in_eac      MCG/ACT           



                                        h_nostr                     



                                        il}                      

 

     Metoprolol Metoprolol           No             1{table BID  Metoprolol     

      



     Tartrate 25 Tartrate 25                          t_with_      Tartrate     

      



     MG   MG                            food}      25 MG           

 

     Ferrous Ferrous           No             1{table TID  Ferrous           



     Sulfate 325 Sulfate 325                          t}        Sulfate         

  



     (65 Fe) MG (65 Fe) MG                                    325 (65           



                                                  Fe) MG           

 

     Mupirocin 2 Mupirocin 2           No                  QD   Mupirocin       

    



     %    %                                       2 %            

 

     Atorvastati Atorvastati           No             1{table QD   Atorvastat   

        



     n Calcium n Calcium                          t}        in Calcium          

 



     40 MG 40 MG                                    40 MG           

 

     Estradiol Estradiol           No                       Estradiol           



     0.1 MG/GM 0.1 MG/GM                                    0.1 MG/GM           

 

     Cephalexin Cephalexin           No                       Cephalexin        

   



     250  MG                                    250 MG           

 

     NovoLOG NovoLOG           No                       NovoLOG           



     FlexPen 100 FlexPen 100                                    FlexPen         

  



     UNIT/ML UNIT/ML                                    100            



                                                  UNIT/ML           

 

     Omeprazole Omeprazole           No             1{capsu QD   Omeprazole     

      



     40 MG 40 MG                          le}       40 MG           

 

     Tradjenta 5 Tradjenta 5           No                       Tradjenta       

    



     MG   MG                                      5 MG           

 

     BD Pen BD Pen           No                       BD Pen           



     Needle Needle                                    Needle           



     Short U/F Short U/F                                    Short U/F           



     31G X 8 MM 31G X 8 MM                                    31G X 8 MM        

   

 

     Isosorbide Isosorbide           No             1{table QD   Isosorbide     

      



     Dinitrate Dinitrate                          t}        Dinitrate           



     30 MG 30 MG                                    30 MG           

 

     Premarin Premarin           No                       Premarin           



     0.625 MG/GM 0.625 MG/GM                                    0.625           



                                                  MG/GM           

 

     Cetirizine Cetirizine           No             1{table      Cetirizine     

      



     HCl 10 MG HCl 10 MG                          t}        HCl 10 MG           

 

     Cephalexin Cephalexin           No             1{capsu QD   Cephalexin     

      



     250  MG                          le}       250 MG           

 

     Zocor 80 MG Zocor 80 MG           No                       Zocor 80        

   



                                                  MG             

 

     Mirtazapine Mirtazapine           No             1{table QD   Mirtazapin   

        



     7.5 MG 7.5 MG                          t_at_be      e 7.5 MG           



                                        dtime}                     

 

     Trimethopri Trimethopri           No             1{table QD   Trimethopr   

        



     m 100 MG m 100 MG                          t}        im 100 MG           

 

     Aspir-81 81 Aspir-81 81           No             1{table QD   Aspir-81     

      



     MG   MG                            t}        81 MG           

 

     Clopidogrel Clopidogrel           No             1{table QD   Clopidogre   

        



     Bisulfate Bisulfate                          t}        l              



     75 MG 75 MG                                    Bisulfate           



                                                  75 MG           

 

     Promethazin Promethazin           No             1{table QID  Promethazi   

        



     e HCl 25 MG e HCl 25 MG                          t_as_ne      ne HCl 25    

       



                                        eded}      MG             

 

     Cozaar 25 Cozaar 25           No             1{table QD   Cozaar 25        

   



     MG   MG                            t}        MG             

 

     Atorvastati Atorvastati           No                       Atorvastat      

     



     n Calcium n Calcium                                    in Calcium          

 



     40 MG 40 MG                                    40 MG           

 

     Levemir Levemir           No                       Levemir           



     FlexTouch FlexTouch                                    FlexTouch           



     100 UNIT/ UNIT/ML                                    100            



                                                  UNIT/ML           

 

     NovoFine NovoFine           No                       NovoFine           



     Plus 32G X Plus 32G X                                    Plus 32G X        

   



     4 MM 4 MM                                    4 MM           

 

     Eliquis 2.5 Eliquis 2.5           No                       Eliquis         

  



     mg 2.5 mg mg 2.5 mg                                    2.5 mg 2.5          

 



                                                  mg             

 

     Nitroglycer Nitroglycer           No                       Nitroglyce      

     



     in 0.4 MG in 0.4 MG                                    rin 0.4 MG          

 

 

     NovoFine NovoFine           No                       NovoFine           



     Plus 32G X Plus 32G X                                    Plus 32G X        

   



     4 MM 4 MM                                    4 MM           

 

     Cetirizine Cetirizine           No                       Cetirizine        

   



     HCl 10 MG HCl 10 MG                                    HCl 10 MG           

 

     Metoprolol Metoprolol           No             1{table QD   Metoprolol     

      



     Succinate Succinate                          t}        Succinate           



     ER 25 MG ER 25 MG                                    ER 25 MG           

 

     Isosorbide Isosorbide           No                       Isosorbide        

   



     Dinitrate Dinitrate                                    Dinitrate           



     30 MG 30 MG                                    30 MG           

 

     Citalopram Citalopram           No                       Citalopram        

   



     Hydrobromid Hydrobromid                                    Hydrobromi      

     



     e 20 MG e 20 MG                                    de 20 MG           

 

     Famotidine Famotidine           No                       Famotidine        

   



     40 MG 40 MG                                    40 MG           

 

     Furosemide Furosemide           No                       Furosemide        

   



     40 MG 40 MG                                    40 MG           

 

     Simvastatin Simvastatin           No                       Simvastati      

     



     80 MG 80 MG                                    n 80 MG           

 

     Levemir 100 Levemir 100           No                  QD   Levemir         

  



     UNIT/ML UNIT/ML                                    100            



                                                  UNIT/ML           

 

     Citalopram Citalopram           No             1{table QD   Citalopram     

      



     Hydrobromid Hydrobromid                          t}        Hydrobromi      

     



     e 20 MG e 20 MG                                    de 20 MG           

 

     Furosemide Furosemide           No             1{table QD   Furosemide     

      



     40 MG 40 MG                          t}        40 MG           

 

     Flonase 50 Flonase 50           No             1{spray QD   Flonase 50     

      



     MCG/ACT MCG/ACT                          _in_eac      MCG/ACT           



                                        h_nostr                     



                                        il}                      

 

     Metoprolol Metoprolol           No             1{table BID  Metoprolol     

      



     Tartrate 25 Tartrate 25                          t_with_      Tartrate     

      



     MG   MG                            food}      25 MG           

 

     Ferrous Ferrous           No             1{table TID  Ferrous           



     Sulfate 325 Sulfate 325                          t}        Sulfate         

  



     (65 Fe) MG (65 Fe) MG                                    325 (65           



                                                  Fe) MG           

 

     Mupirocin 2 Mupirocin 2           No                  QD   Mupirocin       

    



     %    %                                       2 %            

 

     Atorvastati Atorvastati           No             1{table QD   Atorvastat   

        



     n Calcium n Calcium                          t}        in Calcium          

 



     40 MG 40 MG                                    40 MG           

 

     Estradiol Estradiol           No                       Estradiol           



     0.1 MG/GM 0.1 MG/GM                                    0.1 MG/GM           

 

     Cephalexin Cephalexin           No                       Cephalexin        

   



     250  MG                                    250 MG           

 

     NovoLOG NovoLOG           No                       NovoLOG           



     FlexPen 100 FlexPen 100                                    FlexPen         

  



     UNIT/ML UNIT/ML                                    100            



                                                  UNIT/ML           

 

     Omeprazole Omeprazole           No             1{capsu QD   Omeprazole     

      



     40 MG 40 MG                          le}       40 MG           

 

     Tradjenta 5 Tradjenta 5           No                       Tradjenta       

    



     MG   MG                                      5 MG           

 

     BD Pen BD Pen           No                       BD Pen           



     Needle Needle                                    Needle           



     Short U/F Short U/F                                    Short U/F           



     31G X 8 MM 31G X 8 MM                                    31G X 8 MM        

   

 

     Isosorbide Isosorbide           No             1{table QD   Isosorbide     

      



     Dinitrate Dinitrate                          t}        Dinitrate           



     30 MG 30 MG                                    30 MG           

 

     Premarin Premarin           No                       Premarin           



     0.625 MG/GM 0.625 MG/GM                                    0.625           



                                                  MG/GM           

 

     Famotidine Famotidine           No                       Famotidine        

   



     40 MG 40 MG                                    40 MG           

 

     Flonase 50 Flonase 50           No             1{spray QD   Flonase 50     

      



     MCG/ACT MCG/ACT                          _in_eac      MCG/ACT           



                                        h_nostr                     



                                        il}                      

 

     Ferrous Ferrous           No             1{table QD   Ferrous           



     Sulfate 325 Sulfate 325                          t}        Sulfate         

  



     (65 Fe) MG (65 Fe) MG                                    325 (65           



                                                  Fe) MG           

 

     BD Pen BD Pen           No                       BD Pen           



     Needle Needle                                    Needle           



     Short U/F Short U/F                                    Short U/F           



     31G X 8 MM 31G X 8 MM                                    31G X 8 MM        

   

 

     Simvastatin Simvastatin           No                       Simvastati      

     



     80 MG 80 MG                                    n 80 MG           

 

     Trimethopri Trimethopri           No             1{table QD   Trimethopr   

        



     m 100 MG m 100 MG                          t}        im 100 MG           

 

     Omeprazole Omeprazole           No             1{capsu QD   Omeprazole     

      



     40 MG 40 MG                          le}       40 MG           

 

     Estradiol Estradiol           No                       Estradiol           



     0.1 MG/GM 0.1 MG/GM                                    0.1 MG/GM           

 

     Furosemide Furosemide           No             1{table QD   Furosemide     

      



     40 MG 40 MG                          t}        40 MG           

 

     Mirtazapine Mirtazapine           No             1{table QD   Mirtazapin   

        



     7.5 MG 7.5 MG                          t_at_be      e 7.5 MG           



                                        dtime}                     

 

     Promethazin Promethazin           No             1{table QID  Promethazi   

        



     e HCl 25 MG e HCl 25 MG                          t_as_ne      ne HCl 25    

       



                                        eded}      MG             

 

     Furosemide Furosemide           No                       Furosemide        

   



     40 MG 40 MG                                    40 MG           

 

     Levemir Levemir           No                       Levemir           



     FlexTouch FlexTouch                                    FlexTouch           



     100 UNIT/ UNIT/ML                                    100            



                                                  UNIT/ML           

 

     Premarin Premarin           No                       Premarin           



     0.625 MG/GM 0.625 MG/GM                                    0.625           



                                                  MG/GM           

 

     Atorvastati Atorvastati           No             1{table QD   Atorvastat   

        



     n Calcium n Calcium                          t}        in Calcium          

 



     40 MG 40 MG                                    40 MG           

 

     Ferrous Ferrous           No             1{table TID  Ferrous           



     Sulfate 325 Sulfate 325                          t}        Sulfate         

  



     (65 Fe) MG (65 Fe) MG                                    325 (65           



                                                  Fe) MG           

 

     Isosorbide Isosorbide           No                       Isosorbide        

   



     Dinitrate Dinitrate                                    Dinitrate           



     30 MG 30 MG                                    30 MG           

 

     Cephalexin Cephalexin           No             1{capsu QD   Cephalexin     

      



     250  MG                          le}       250 MG           

 

     Citalopram Citalopram           No             1{table QD   Citalopram     

      



     Hydrobromid Hydrobromid                          t}        Hydrobromi      

     



     e 20 MG e 20 MG                                    de 20 MG           

 

     Metoprolol Metoprolol           No             1{table BID  Metoprolol     

      



     Tartrate 25 Tartrate 25                          t_with_      Tartrate     

      



     MG   MG                            food}      25 MG           

 

     NovoFine NovoFine           No                       NovoFine           



     Plus 32G X Plus 32G X                                    Plus 32G X        

   



     4 MM 4 MM                                    4 MM           

 

     Aspir-81 81 Aspir-81 81           No             1{table QD   Aspir-81     

      



     MG   MG                            t}        81 MG           

 

     Levemir 100 Levemir 100           No                  QD   Levemir         

  



     UNIT/ML UNIT/ML                                    100            



                                                  UNIT/ML           

 

     Isosorbide Isosorbide           No                       Isosorbide        

   



     Dinitrate Dinitrate                                    Dinitrate           



     30 MG 30 MG                                    30 MG           

 

     Zocor 80 MG Zocor 80 MG           No                       Zocor 80        

   



                                                  MG             

 

     NovoLOG NovoLOG           No                       NovoLOG           



     FlexPen 100 FlexPen 100                                    FlexPen         

  



     UNIT/ML UNIT/ML                                    100            



                                                  UNIT/ML           

 

     Metoprolol Metoprolol           No             1{table QD   Metoprolol     

      



     Succinate Succinate                          t}        Succinate           



     ER 25 MG ER 25 MG                                    ER 25 MG           

 

     Cetirizine Cetirizine           No                       Cetirizine        

   



     HCl 10 MG HCl 10 MG                                    HCl 10 MG           

 

     Nitroglycer Nitroglycer           No                       Nitroglyce      

     



     in 0.4 MG in 0.4 MG                                    rin 0.4 MG          

 

 

     Cetirizine Cetirizine           No             1{table      Cetirizine     

      



     HCl 10 MG HCl 10 MG                          t}        HCl 10 MG           

 

     Eliquis 2.5 Eliquis 2.5           No                       Eliquis         

  



     mg 2.5 mg mg 2.5 mg                                    2.5 mg 2.5          

 



                                                  mg             

 

     Mupirocin 2 Mupirocin 2           No                  QD   Mupirocin       

    



     %    %                                       2 %            

 

     Tradjenta 5 Tradjenta 5           No                       Tradjenta       

    



     MG   MG                                      5 MG           

 

     Citalopram Citalopram           No                       Citalopram        

   



     Hydrobromid Hydrobromid                                    Hydrobromi      

     



     e 20 MG e 20 MG                                    de 20 MG           

 

     Atorvastati Atorvastati           No                       Atorvastat      

     



     n Calcium n Calcium                                    in Calcium          

 



     40 MG 40 MG                                    40 MG           

 

     NovoFine NovoFine           No                       NovoFine           



     Plus 32G X Plus 32G X                                    Plus 32G X        

   



     4 MM 4 MM                                    4 MM           

 

     Clopidogrel Clopidogrel           No             1{table QD   Clopidogre   

        



     Bisulfate Bisulfate                          t}        l              



     75 MG 75 MG                                    Bisulfate           



                                                  75 MG           

 

     Cephalexin Cephalexin           No                       Cephalexin        

   



     250  MG                                    250 MG           

 

     Cozaar 25 Cozaar 25           No             1{table QD   Cozaar 25        

   



     MG   MG                            t}        MG             







Immunizations







           Ordered Immunization Filled Immunization Date       Status     Commen

ts   Source



           Name       Name                                        

 

           FluAD      FluAD      2021-10-14 Completed             [facility]



                                 10:32:00                         - [facility]

 

           FluAD      FluAD      2021-10-14 Completed             [facility]



                                 10:32:00                         - [facility]

 

           FluAD      FluAD      2021-10-14 Completed             [facility]



                                 10:32:00                         - [facility]

 

           FluAD      FluAD      2021-10-14 Completed             [facility]



                                 10:32:00                         - [facility]

 

           FluAD      FluAD      2021-10-14 Completed             [facility]



                                 10:32:00                         - [facility]

 

           FluAD      FluAD      2021-10-14 Completed             [facility]



                                 10:32:00                         - [facility]

 

           FluAD      FluAD      2021-10-14 Completed             [facility]



                                 10:32:00                         - [facility]

 

           FluAD      FluAD      2021-10-14 Completed             [facility]



                                 10:32:00                         - [facility]

 

           FluAD      FluAD      2021-10-14 Completed             [facility]



                                 10:32:00                         - [facility]

 

           FluAD      FluAD      2021-10-14 Completed             [facility]



                                 10:32:00                         - [facility]

 

           FluAD      FluAD      2021-10-14 Completed             [facility]



                                 10:32:00                         - [facility]

 

           FluAD      FluAD      2021-10-14 Completed             [facility]



                                 10:32:00                         - [facility]

 

           FluAD      FluAD      2021-10-14 Completed             [facility]



                                 10:32:00                         - [facility]

 

           FluAD      FluAD      2021-10-14 Completed             [facility]



                                 10:32:00                         - [facility]

 

           FluAD      FluAD      2021-10-14 Completed             [facility]



                                 10:32:00                         - [facility]

 

           Moderna COVID-19 Moderna COVID-19 2021-09-10 Completed             [facility]



           Vaccine    Vaccine    14:39:00                         - [facility]

 

           Moderna COVID-19 Moderna COVID-19 2021-09-10 Completed             [facility]

[facility]



           Vaccine    Vaccine    14:39:00                         - [facility]

 

           Moderna COVID-19 Moderna COVID-19 2021-09-10 Completed             [facility]

[facility]



           Vaccine    Vaccine    14:39:00                         - [facility]

 

           Moderna COVID-19 Moderna COVID-19 2021-09-10 Completed             [facility]

[facility]



           Vaccine    Vaccine    14:39:00                         - [facility]

 

           Moderna COVID-19 Moderna COVID-19 2021-09-10 Completed             [facility]

[facility]



           Vaccine    Vaccine    14:39:00                         - [facility]

 

           Moderna COVID-19 Moderna COVID-19 2021-09-10 Completed             [facility]

[facility]



           Vaccine    Vaccine    14:39:00                         - [facility]

 

           Moderna COVID-19 Moderna COVID-19 2021-09-10 Completed             [facility]

[facility]



           Vaccine    Vaccine    14:39:00                         - [facility]

 

           Moderna COVID-19 Moderna COVID-19 2021-09-10 Completed             [facility]

[facility]



           Vaccine    Vaccine    14:39:00                         - [facility]

 

           Moderna COVID-19 Moderna COVID-19 2021-09-10 Completed             [facility]

[facility]



           Vaccine    Vaccine    14:39:00                         - [facility]

 

           Moderna COVID-19 Moderna COVID-19 2021-09-10 Completed             [facility]

[facility]



           Vaccine    Vaccine    14:39:00                         - [facility]

 

           Moderna COVID-19 Moderna COVID-19 2021-09-10 Completed             [facility]

[facility]



           Vaccine    Vaccine    14:39:00                         - [facility]

 

           Moderna COVID-19 Moderna COVID-19 2021-09-10 Completed             [facility]

[facility]



           Vaccine    Vaccine    14:39:00                         - [facility]

 

           Moderna COVID-19 Moderna COVID-19 2021-09-10 Completed             [facility]

[facility]



           Vaccine    Vaccine    14:39:00                         - [facility]

 

           Moderna COVID-19 Moderna COVID-19 2021-09-10 Completed             [facility]

[facility]



           Vaccine    Vaccine    14:39:00                         - [facility]

 

           Moderna COVID-19 Moderna COVID-19 2021-09-10 Completed             [facility]

[facility]



           Vaccine    Vaccine    14:39:00                         - [facility]

 

           Moderna COVID-19 Moderna COVID-19 2021-09-10 Completed             [facility]

[facility]



           Vaccine    Vaccine    14:39:00                         - [facility]

 

           Moderna COVID-19 Moderna COVID-19 2021 Completed             [facility]

[facility]



           Vaccine    Vaccine    11:04:00                         - [facility]

 

           Moderna COVID-19 Moderna COVID-19 2021 Completed             [facility]

[facility]



           Vaccine    Vaccine    11:04:00                         - [facility]

 

           Moderna COVID-19 Moderna COVID-19 2021 Completed             [facility]

[facility]



           Vaccine    Vaccine    11:04:00                         [facility]

 

           Moderna COVID-19 Moderna COVID-19 2021 Completed             [facility]

[facility]



           Vaccine    Vaccine    11:04:00                         - [facility]

 

           Moderna COVID-19 Moderna COVID-19 2021 Completed             [facility]

[facility]



           Vaccine    Vaccine    11:04:00                         - [facility]

 

           Moderna COVID-19 Moderna COVID-19 2021 Completed             [facility]

[facility]



           Vaccine    Vaccine    11:04:00                         - [facility]

 

           Moderna COVID-19 Moderna COVID-19 2021 Completed             [facility]

[facility]



           Vaccine    Vaccine    11:04:00                         - [facility]

 

           Moderna COVID-19 Moderna COVID-19 2021 Completed             [facility]

[facility]



           Vaccine    Vaccine    11:04:00                         - [facility]

 

           Moderna COVID-19 Moderna COVID-19 2021 Completed             [facility]

[facility]



           Vaccine    Vaccine    11:04:00                         - [facility]

 

           Moderna COVID-19 Moderna COVID-19 2021 Completed             [facility]

[facility]



           Vaccine    Vaccine    11:04:00                         - [facility]

 

           Moderna COVID-19 Moderna COVID-19 2021 Completed             [facility]

[facility]



           Vaccine    Vaccine    11:04:00                         - [facility]

 

           Moderna COVID-19 Moderna COVID-19 2021 Completed             [facility]

[facility]



           Vaccine    Vaccine    11:04:00                         - [facility]

 

           Moderna COVID-19 Moderna COVID-19 2021 Completed             [facility]

[facility]



           Vaccine    Vaccine    11:04:00                         - [facility]

 

           Moderna COVID-19 Moderna COVID-19 2021 Completed             [facility]

[facility]



           Vaccine    Vaccine    11:04:00                         - [facility]

 

           Moderna COVID-19 Moderna COVID-19 2021 Completed             [facility]

[facility]



           Vaccine    Vaccine    11:04:00                         - [facility]

 

           Moderna COVID-19 Moderna COVID-19 2021 Completed             [facility]

[facility]



           Vaccine    Vaccine    11:04:00                         - [facility]

 

           FluAD      FluAD      2019-12-10 Completed             [facility]



                                 11:18:00                         - [facility]

 

           FluAD      FluAD      2019-12-10 Completed             [facility]



                                 11:18:00                         [facility]

 

           FluAD      FluAD      2019-12-10 Completed             [facility]



                                 11:18:00                         - [facility]

 

           FluAD      FluAD      2019-12-10 Completed             [facility]



                                 11:18:00                         - [facility]

 

           FluAD      FluAD      2019-12-10 Completed             [facility]



                                 11:18:00                         - [facility]

 

           FluAD      FluAD      2019-12-10 Completed             [facility]



                                 11:18:00                         - [facility]

 

           FluAD      FluAD      2019-12-10 Completed             [facility]



                                 11:18:00                         - [facility]

 

           FluAD      FluAD      2019-12-10 Completed             [facility]



                                 11:18:00                         - [facility]

 

           FluAD      FluAD      2019-12-10 Completed             [facility]



                                 11:18:00                         - [facility]

 

           FluAD      FluAD      2019-12-10 Completed             [facility]



                                 11:18:00                         - [facility]

 

           FluAD      FluAD      2019-12-10 Completed             [facility]



                                 11:18:00                         - [facility]

 

           FluAD      FluAD      2019-12-10 Completed             [facility]



                                 11:18:00                         - [facility]

 

           FluAD      FluAD      2019-12-10 Completed             [facility]



                                 11:18:00                         - [facility]

 

           FluAD      FluAD      2019-12-10 Completed             [facility]



                                 11:18:00                         - [facility]

 

           FluAD      FluAD      2019-12-10 Completed             [facility]



                                 11:18:00                         - [facility]

 

           FluAD      FluAD      2019-12-10 Completed             [facility]



                                 11:18:00                         - [facility]

 

           FluAD      FluAD      2019-12-10 Completed             [facility]



                                 00:00:00                         - [facility]

 

           FluAD      FluAD      2018 Completed             [facility]



                                 12:03:00                         - [facility]

 

           FluAD      FluAD      2018 Completed             [facility]



                                 12:03:00                         - [facility]

 

           FluAD      FluAD      2018 Completed             [facility]



                                 12:03:00                         - [facility]

 

           FluAD      FluAD      2018 Completed             [facility]



                                 12:03:00                         - [facility]

 

           FluAD      FluAD      2018 Completed             [facility]



                                 12:03:00                         - [facility]

 

           FluAD      FluAD      2018 Completed             [facility]



                                 12:03:00                         - [facility]

 

           FluAD      FluAD      2018 Completed             [facility]



                                 12:03:00                         - [facility]

 

           FluAD      FluAD      2018 Completed             [facility]



                                 12:03:00                         - [facility]

 

           FluAD      FluAD      2018 Completed             [facility]



                                 12:03:00                         - [facility]

 

           FluAD      FluAD      2018 Completed             [facility]



                                 12:03:00                         - [facility]

 

           FluAD      FluAD      2018 Completed             [facility]



                                 12:03:00                         - [facility]

 

           FluAD      FluAD      2018 Completed             [facility]



                                 12:03:00                         - [facility]

 

           FluAD      FluAD      2018 Completed             [facility]



                                 12:03:00                         - [facility]

 

           FluAD      FluAD      2018 Completed             [facility]



                                 12:03:00                         - [facility]

 

           FluAD      Flu      2018 Completed             [facility]



                                 [facility]:03[facility]00                         [facility]      [facility]      2018 Completed             [facility]



                                 12:03:00                         - [facility]







Vital Signs







             Vital Name   Observation Time Observation Value Comments     Source

 

             height       2022 11:20:00 67 [in_i]                 [facility]

 

             weight       2022 11:20:00 165 [lb_av]               [facility]

 

             bmi          2022 11:20:00 25.84 kg/m2               [facility]

 

             height       2022 15:40:00 67 [in_i]                 [facility]

 

             weight       2022 15:40:00 169 [lb_av]               [facility]

 

             bmi          2022 15:40:00 26.47 kg/m2               [facility]

 

             temperature  2022 11:20:00 97.2 [degF]               [facility]

 

             bmi          2022 11:20:00 25.37 kg/m2               [facility]

 

             oximetry     2022 11:20:00 98 %                      [facility]

 

             respiratory rate 2022 11:20:00 18 /min                   Comm

on [facility]

 

             blood pressure 2022 11:20:00 134 mm[Hg]                [facility]

 [facility] -



             systolic                                            [facility]

 

             blood pressure 2022 11:20:00 50 mm[Hg]                 [facility]

 [facility] -



             diastolic                                           [facility]

 

             height       2022 11:20:00 67.00 [in_i]              [facility]

 

             weight       2022 11:20:00 162.0 [lb_av]              [facility]

 

             height       2022 11:20:00 67.00 [in_i]              [facility]

 

             weight       2022 11:20:00 177.6 [lb_av]              [facility]

 

             temperature  2022 11:20:00 97.3 [degF]               [facility]

 

             bmi          2022 11:20:00 27.81 kg/m2               [facility]

 

             oximetry     2022 11:20:00 100 %                     [facility]

 

             respiratory rate 2022 11:20:00 16 /min                   Comm

on [facility]

 

             blood pressure 2022 11:20:00 160 mm[Hg]                [facility]



             systolic                                            [facility]

 

             blood pressure 2022 11:20:00 79 mm[Hg]                 [facility]



             diastolic                                           [facility]

 

             height       2021 09:40:00 67.00 [in_i]              [facility]

 

             weight       2021 09:40:00 170 [lb_av]               [facility]

 

             temperature  2021 09:40:00 99.7 [degF]               [facility]

 

             bmi          2021 09:40:00 26.62 kg/m2               [facility]

 

             height       2021-10-19 09:40:00 67.00 [in_i]              [facility]

 

             weight       2021-10-19 09:40:00 170.6 [lb_av]              [facility]

 

             temperature  2021-10-19 09:40:00 97.8 [degF]               [facility]

 

             bmi          2021-10-19 09:40:00 26.72 kg/m2               [facility]

 

             oximetry     2021-10-19 09:40:00 93 %                      [facility]

 

             blood pressure 2021-10-19 09:40:00 149 mm[Hg]                [facility]

 [facility]



             systolic                                            [facility]

 

             blood pressure 2021-10-19 09:40:00 63 mm[Hg]                 [facility] -



             diastolic                                           [facility]

 

             height       2021-10-14 09:00:00 67.00 [in_i]              [facility]

 

             weight       2021-10-14 09:00:00 170 [lb_av]               [facility]

 

             temperature  2021-10-14 09:00:00 97.2 [degF]               [facility]

 

             bmi          2021-10-14 09:00:00 26.62 kg/m2               [facility]

 

             oximetry     2021-10-14 09:00:00 97 %                      [facility]

 

             respiratory rate 2021-10-14 09:00:00 20 /min                   Comm

on [facility]

 

             blood pressure 2021-10-14 09:00:00 163 mm[Hg]                [facility]



             systolic                                            [facility]

 

             blood pressure 2021-10-14 09:00:00 71 mm[Hg]                 [facility]



             diastolic                                           [facility]







Procedures

This patient has no known procedures.



Encounters







        Start   End     Encounter Admission Attending Care    Care    Encounter 

Source



        Date/Time Date/Time Type    Type    Clinicians Facility Department ID   

   

 

        2022         [facility]  [id number]

2 [facility]



        09:17:00                                                    [facility]

 

        2022         Outpatient         Jessica, Na [facility]  [facility]  [id number]

2 [facility]



        10:50:00                                                 [id number]   [facility]

 

        2022-08-10         Outpatient         Lopez, Na [facility]  [facility]  [id number]

2 [facility]



        11:52:00                                                    [facility]

 

        2022         Outpatient         Jessica, Na [facility]  [facility]  [id number]

2 Common



        16:04:00                                                    [facility]

 

        2022         Outpatient         Lopez, Na [facility]  STL[facility]  [id number]

2 Common



        14:34:32                                                    [facility]

 

        2022         Outpatient         Lopez, Na [facility]  STL[facility]C  [id number]

2 Common



        14:27:41                                                    [facility]

 

        2022         Outpatient         Lopez, Na [facility]  STL[facility]C  [id number]

2 Common



        14:21:08                                                 [id number]   [facility]

 

        2022         Outpatient         Lopez, Na [facility]  STL[facility]C  [id number]

2 Common



        14:01:03                                                 [id number]   [facility]

 

        2022         Outpatient         Lopez, Na [facility]  STL[facility]C  [id number]

2 Common



        13:59:36                                                 [id number]   [facility]

 

        2022         Outpatient         Lopez, Na [facility]  STL[facility]C  [id number]

2 Common



        13:53:51                                                 [id number]   [facility]

 

        2022         Outpatient         Lopez, Na [facility]  STL[facility]C  [id number]

2 Common



        13:38:12                                                 [id number]   [facility]

 

        2022         Outpatient         Lopez, Na [facility]  STL[facility]C  [id number]

2 Common



        13:37:50                                                 [id number]   [facility]

 

        2022         Outpatient         Lopez, Na [facility]  STL[facility]C  [id number]

2 Common



        13:22:23                                                 [id number]   [facility]

 

        2022         Outpatient         Lopez, Na [facility]  STL[facility]C  [id number]

2 Common



        12:54:21                                                 [id number]   [facility]

 

        2022         Outpatient         Lopez, Na [facility]  STL[facility]C  [id number]

2 Common



        12:50:26                                                 [id number]   [facility]

 

        2022         Outpatient         Lopez, Na [facility]  STLMLC  [id number]

2 Common



        12:17:30                                                 [id number]   [facility]

 

        2022         Outpatient         Lopez, Na [facility]  STL[facility]C  [id number]

2 Common



        12:16:14                                                 [id number]   [facility]

 

        2022         Outpatient         Lopez, Na STL[facility]  STLMLC  [id number]

2 Common



        11:59:36                                                 [id number]   [facility]

 

        2022         Outpatient         Lopez, Na STL[facility]C  STLMLC  [id number]

2 Common



        11:58:00                                                 [id number]   [facility]

 

        2022         Outpatient         Lopez, Na STL[facility]  STLMLC  [id number]

2 Common



        11:45:30                                                 [id number]   [facility]

 

        2022         Outpatient         Lopez, Na [facility]  STLMLC  [id number]

2 Common



        11:23:16                                                 [id number]   [facility]

 

        2022         Outpatient         Lopez, Na STLMLC  STLMLC  [id number]

2 Common



        11:17:39                                                 [id number]   [facility]

 

        2021-10-09         Outpatient         RASLAN, [facility]    Surgery [id number]

 [facility]



        15:26:56                         [facility]                          

 

        2021-10-09         Outpatient         RASLAN, [facility]    Surgery [id number]

 [facility]



        12:31:01                         [facility]                          

 

        2021-10-09         Inpatient         RASLAN, SLE    Surgery [id number] 

[facility]



        11:38:46                         [facility]                          

 

        2021-10-09         Inpatient UR      NASSER, [facility]   Cardiology [id number]

[address] [facility] [address]



        11:38:20                         [facility]

 

        2021         Inpatient         Raslan, [facility]   [facility]   [id number] 

[facility]



        13:58:38                         Chaim                  23      [facility]

 

        2022 OFFICE                  [facility]  [facility]  [id number] Co

mmon



        00:00:00 00:00:00 VISIT EST                                         [facility]

it



                        PT LEVEL 3                                         [facility]

 

        2022 (TEL)                   STLMLC  STL[facility]C  [id number] Co

mmon



        00:00:00 00:00:00                                                 [facility]

 

        2022 (TEL)                   STLMLC  STLMLC  [id number] Co

mmon



        00:00:00 00:00:00                                                 [facility]

 

        2022 OFFICE                  STLMLC  STLMLC  [id number] Co

mmon



        00:00:00 00:00:00 VISIT                                           [facility]



                        [facility]AB PT                                         - [facility]



                        LEVEL 4                                         [facility]

 

        2022 OFFICE                  STLMLC  STLMLC  [id number] Co

mmon



        00:00:00 00:00:00 VISIT                                           [facility]



                        ESTAB PT                                         - [facility]



                        LEVEL 4                                         [facility]

 

        2022 (TEL)                   STLMLC  STLMLC  [id number] Co

mmon



        00:00:00 00:00:00                                                 [facility]



                                                                        [facility]

 

        2022 (TEL)                   ST[facility]C  STL[facility]C  [id number] Co

mmon



        00:00:00 00:00:00                                                 [facility]

 

        2022 OFFICE                  STL[facility]C  STLMLC  [id number] Co

mmon



        00:00:00 00:00:00 VISIT                                           [facility]



                        ESTAB PT                                         - [facility]



                        LEVEL 4                                         [facility]

 

        2022 (TEL)                   STL[facility]C  STLMLC  [id number] Co

mmon



        00:00:00 00:00:00                                                 [facility]



                                                                        [facility]

 

        2022 OFFICE                  STLMLC  STLMLC  [id number] Co

mmon



        00:00:00 00:00:00 VISIT                                           [facility]



                        ESTAB PT                                         - [facility]



                        LEVEL 4                                         [facility]

 

        2022 (TEL)                   STLMLC  STLMLC  [id number] Co

mmon



        00:00:00 00:00:00                                                 [facility]

 

        2021 (TEL)                   STLMLC  STLMLC  [id number] Co

mmon



        00:00:00 00:00:00                                                 [facility]

 

        2021 OFFICE                  STLMLC  STLMLC  [id number] Co

mmon



        00:00:00 00:00:00 VISIT EST                                         [facility]

it



                        PT LEVEL 3                                         - [facility]



                                                                        [facility]

 

        2021-10-19 2021-10-19 OFFICE                  STLMLC  STLMLC  [id number] Co

mmon



        00:00:00 00:00:00 VISIT EST                                         S[facility]

it



                        PT LEVEL 3                                         - [facility]

 

        2021-10-14 2021-10-14 SUB ANNUAL                 STLMLC  STLMLC  [id number]

 Common



        00:00:00 00:00:00 MCR                                             [facility]



                        WELLNESS                                         - [facility]



                        VISIT                                           [facility]

 

        2021-09-10 2021-09-10 (COVID                  STLMLC  STLMLC  [id number] Co

mmon



        00:00:00 00:00:00 Inj) COVID                                         S[facility]

rit



                        Injection                                         [facility]

 

        2021 Outpatient                 STL[facility]C  STL[facility]C  [id number]

 [facility]



        00:00:00 00:00:00                                                 [facility]

 

        2021 Outpatient                 STL[facility]C  STLMLC  [id number]

 [facility]



        00:00:00 00:00:00                                                 [facility]

 

        2021 Outpatient                 [facility]  STL[facility]C  [id number]

 [facility]



        00:00:00 00:00:00                                                 [facility]

 

        2021 Outpatient                 [facility]  STLMLC  [id number]

 [facility]



        00:00:00 00:00:00                                                 [facility]

 

        2021 Outpatient                 [facility]C  STLMLC  [id number]

 [facility]



        00:00:00 00:00:00                                                 [facility]

 

        2021 Outpatient                 STLMLC  STLMLC  [id number]

 [facility]



        00:00:00 00:00:00                                                 [facility]

 

        2021 Outpatient                 STLMLC  STLMLC  [id number]

 Common



        00:00:00 00:00:00                                                 [facility]

 

        2021 Outpatient                 STL[facility]C  STLMLC  [id number]

 Common



        00:00:00 00:00:00                                                 [facility]

 

        2021 Outpatient                 STLMLC  STLMLC  [id number]

 Common



        00:00:00 00:00:00                                                 [facility]

 

        2021 Outpatient                 STLMLC  STLMLC  [id number]

 Common



        00:00:00 00:00:00                                                 [facility]

 

        2021 Outpatient                 STL[facility]C  STLMLC  [id number]

 Common



        00:00:00 00:00:00                                                 [facility]

 

        2021 Outpatient                 STL[facility]C  STLMLC  [id number]

 Common



        00:00:00 00:00:00                                                 [facility]

 

        2021 Outpatient                 STLMLC  STLMLC  [id number]

 [facility]



        00:00:00 00:00:00                                                 [facility]

 

        2020 Outpatient                 STL[facility]C  STLMLC  [id number]

 Common



        00:00:00 00:00:00                                                 [facility]

 

        2020 Outpatient                 STL[facility]C  STLMLC  [id number]

 Common



        00:00:00 00:00:00                                                 [facility]

 

        2020-10-29 2020-10-29 Outpatient                 STLMLC  STLMLC  [id number]

 [facility]



        00:00:00 00:00:00                                                 [facility]

 

        2020-10-23 2020-10-23 Outpatient                 STL[facility]C  STLMLC  [id number]

 [facility]



        00:00:00 00:00:00                                                 [facility]

 

        2020 Outpatient                 STLMLC  STLMLC  [id number]

 Common



        00:00:00 00:00:00                                                 [facility]

 

        2020 Outpatient                 [id number]

[id number] Common



        09:40:00 09:40:00                         [place]   [address]         Spir

it



                                                Drive   [facility]

 

        2020 Outpatient                 [facility] [id number]

[id number] Common



        09:40:00 09:40:00                         [place]   [address]         Spir

it



                                                Drive   [facility]

 

        2020 Outpatient                 [facility] [address]

[id number] Common



        14:40:00 14:40:00                         [place]   [address]         Spir

it



                                                Drive   [facility]

 

        2020 Outpatient                 [facility] [address]

[id number] Common



        11:00:00 11:00:00                         [place]   [address]         Spir

it



                                                Drive   [facility]

 

        2020 Outpatient                 [facility]

[id number] Common



        14:00:00 14:00:00                         t       Specialty/U         Sp

jose miguel



                                                Specialty rology          - [facility]



                                                [facility]          [facility]

 

        2020 Outpatient                 [facility] [address]

[id number] Common



        08:53:00 08:53:00                         [place]  [address]         Spir

it



                                                Road    [facility]

 

        2020 Outpatient                 [facility] [address]

[id number] Common



        09:31:00 09:31:00                         [place]   [address]         Spir

it



                                                Drive   [facility]

 

        2020 Outpatient                 [facility] [address]

[id number] Common



        09:20:00 09:20:00                         [place]   [address]         Spir

it



                                                Drive   [facility]

 

        2020 Outpatient                 [facility] [id number]

[id number] Common



        08:40:00 08:40:00                         [place]   [address]         Spir

it



                                                Drive   [facility]

 

        2020 Outpatient                 [facility] [address]

[id number] Common



        16:55:00 16:55:00                         [place]   [address]         Spir

it



                                                Drive   [facility]

 

        2020 Outpatient                 [facility]

[id number] Common



        11:00:00 11:00:00                         t       Specialty/U         Sp

jose miguel



                                                Specialty rology          - [facility]



                                                /Urology Clinic          [facility]

 

        2019 Outpatient                 [facility]

[id number] Common



        15:51:00 15:51:00                         [place]   [address]         Spir

it



                                                Drive   [facility]

 

        2019-12-10 2019-12-10 Outpatient                 [facility] [address]

[id number] Common



        09:40:00 09:40:00                         [place]   [address]         Spir

it



                                                Drive   [facility]

 

        2019 Outpatient                 [facility] [address]

[id number] Common



        16:28:00 16:28:00                         t [employer]         Spir

it



                                                Drive   [facility]

 

        2019 Outpatient                 [facility]

[id number] Common



        09:52:00 09:52:00                         t       Specialty/U         Sp

jose miguel



                                                Specialty rology          - [facility]



                                                [facility]          [facility]

 

        2019 Outpatient                 [facility]

[id number] Common



        14:46:00 14:46:00                         t       Specialty/U         Sp

jose miguel



                                                Specialty rology          - [facility]



                                                [facility]          [facility]

 

        2019 Outpatient                 [facility]

[id number] Common



        10:25:00 10:25:00                         [facility]



                                                [facility]

 

        2019 Outpatient                 [facility]

[id number] Common



        09:15:00 09:15:00                         t       Specialty/U         Sp

jose miguel



                                                Specialty rology          - [facility]



                                                [facility]          [facility]

 

        2019-10-03 2019-10-03 Outpatient                 [facility]

[id number] Common



        09:15:00 09:15:00                         t       Specialty/U         Sp

jose miguel



                                                Specialty rology          - [facility]



                                                /[facility]rology Clinic          [facility]

 

        2019-09-10 2019-09-10 Outpatient                 [facility]

[id number] Common



        08:40:00 08:40:00                         t Log[employer]         \[facility] [place] [facility]\""

it



                                                Drive   [facility]

 

        2019 Outpatient                 [facility] [address]

[id number] Common



        13:44:00 13:44:00                         t       Specialty/U         Sp

jose miguel



                                                Specialty rology          - [facility]



                                                /[facility]rology [facility]          [facility]

 

        2019-07-15 2019-07-15 Outpatient                 [facility]

[id number] Common



        13:45:00 13:45:00                         t       Specialty/U         Sp

jose miguel



                                                Specialty rology          - [facility]



                                                /[facility]          [facility]

 

        2019 Outpatient                 [facility]

[id number] Common



        10:15:00 10:15:00                         t       Specialty/U         Sp

jose miguel



                                                Specialty rology          - [facility]



                                                /[facility]          [facility]

 

        2019 Outpatient                 [facility]

[id number] Common



        15:07:00 15:07:00                         t [address]   [address]         Spir

it



                                                Drive   [facility]

 

        2019 Outpatient                 [facility]

[id number] Common



        11:20:00 11:20:00                         t [place]   [address]         Spir

it



                                                Drive   [facility]

 

        2019-05-15 2019-05-15 Outpatient                 [facility]

[id number] Common



        11:38:00 11:38:00                         t Oak   [address]         Spir

it



                                                Drive   [facility]

 

        2019 Outpatient                 [facility]

[id number] Common



        11:00:00 11:00:00                         t       Specialty/U         Sp

jose miguel



                                                Specialty rology          - [facility]



                                                /Urology Clinic          [facility]

 

        2019 Outpatient                 [facility]

[id number] Common



        10:28:00 10:28:00                         t       Specialty/U         Sp

jose miguel



                                                Specialty rology          - [facility]



                                                [facility]          [facility]

 

        2019 Outpatient                 [facility]

[id number] Common



        13:50:00 13:50:00                         t       Specialty/U         Sp

jose miguel



                                                Specialty rology          - [facility]



                                                /[facility]          [facility]

 

        2019 Outpatient                 [facility]

[id number] Common



        09:00:00 09:00:00                         t       Specialty/U         Sp

jose miguel



                                                Specialty rology          - [facility]



                                                /[facility]          [facility]

 

        2019 Outpatient                 [facility]

[id number] Common



        18:12:00 18:12:00                         t [address]   [address]         Spir

it



                                                Drive   [facility]

 

        2019 Outpatient                 [facility]

[id number] Common



        09:45:00 09:45:00                         t [place]   [address]         Spir

it



                                                Drive   [facility]

 

        2019 Outpatient                 [facility]

[id number] Common



        09:09:00 09:09:00                         t       Specialty/U         Sp

jose miguel



                                                Specialty rology          - [facility]



                                                /[facility]          [facility]

 

        2019 Outpatient                 [facility]

[id number] Common



        10:22:00 10:22:00                         t [address]   [address]         Spir

it



                                                Drive   [facility]

 

        2019 Outpatient                 [facility]

[id number] Common



        11:03:00 11:03:00                         t Oak   [address]         Spir

it



                                                Drive   [facility]

 

        2019 Outpatient                 [facility]

[id number] Common



        11:30:00 11:30:00                         t       Specialty/U         Sp

jose miguel



                                                Specialty rology          - [facility]



                                                /Urology Clinic          [facility]

 

        2019 Outpatient                 [facility]

[id number] Common



        09:20:00 09:20:00                         t [address]   [address]         Spir

it



                                                Drive   [facility]

 

        2019 Outpatient                 [facility]

[id number] Common



        09:30:00 09:30:00                         t Oak   [address]         Spir

it



                                                Drive   [facility]

 

        2018 Outpatient                 [facility]

[id number] Common



        12:15:00 12:15:00                         t Oak   [address]         Spir

it



                                                Drive   [facility]

 

        2018 Outpatient                 [facility]

[id number] Common



        09:30:00 09:30:00                         t Oak   [address]         Spir

it



                                                Drive   [facility]

 

        2018 Outpatient                 [facility]

[id number] Common



        11:15:00 11:15:00                         t Oak   [address]         Spir

it



                                                Drive   [facility]

 

        2018 Outpatient                 [facility]

[id number] Common



        11:30:00 11:30:00                         t Oak   [address]         Spir

it



                                                Drive   [facility]

 

        2018 Outpatient                 [facility]

[id number] Common



        14:47:00 14:47:00                         t Oak   [address]         Spir

it



                                                Drive   [facility]

 

        2018 Outpatient                 [facility]

[id number] Common



        09:21:00 09:21:00                         t Oak   [address]         Spir

it



                                                Drive   [facility]

 

        2018 Outpatient                 [facility]

[id number] Common



        15:15:00 15:15:00                         t Oak   [address]         Spir

it



                                                Drive   [facility]

 

        2018 Outpatient                 [facility]

[id number] Common



        13:52:00 13:52:00                         t Oak   [address]         Spir

it



                                                Drive   [facility]

 

        2018 Outpatient                 [facility]

[id number] Common



        13:42:00 13:42:00                         t Oak   [address]         Spir

it



                                                Drive   [facility]

 

        2018 Outpatient                 [facility]

[id number] Common



        10:15:00 10:15:00                         t Oak   [address]         Spir

it



                                                Drive   [facility]

 

        2018 Outpatient                 [facility]

[id number] Common



        10:15:00 10:15:00                         t Oak   [address]         Spir

it



                                                Drive   [facility]

 

        2018 Outpatient                 [facility]

[id number] Common



        14:44:00 14:44:00                         t Oak   [address]         Spir

it



                                                Drive   [facility]







Results







           Test Description Test Time  Test Comments Results    Result Comments 

Source









                    Urine Culture,Comprehensive 2022 00:00:00 









                      Test Item  Value      Reference Range Interpretation Comme

nts









             Urine Culture,Comprehensive (test code = 630-4) Final report       

       A            



[id number] 14:05:00





             Test Item    Value        Reference Range Interpretation Comments

 

             ACT-ISTAT (test code 301 SEC             H            Perform

ed by certified



             = ACTI)                                              at [facility]



[id number] 13:45:00





             Test Item    Value        Reference Range Interpretation Comments

 

             ACT-ISTAT (test code 263 SEC             H            Perform

ed by certified



             = ACTI)                                              at [facility]



[id number] 09:37:00





             Test Item    Value        Reference Range Interpretation Comments

 

             GLUBED (test code = 111 MG/DL           H            Performe

d by certified



             GLUBED)                                              at [facility]



- XR CHEST 2 -78-30 11:55:00
********************************[facility] 1935 Sex: 
F************************************************************ FAX: Chaim Lynne [phone] [place]:  St: 
PRE------------------------------------------------
------------------------------- Name: KELLY SCRUGGS [facility] : 
1935 Age/S: 85/F [address] [place] [address] Unit #: [id number] Loc: [place] [address] Phys: Chaim Santizo MD Acct: [id number] Dis Date: Status: 
PRE SDC  PHONE #: [phone] Exam Date: 2021 1145 FAX #: [phone] 
Reason: [facility] EXAMS:  CPT CODE: [id number] XR CHEST 2 V 19676 PROCEDURE: CHEST TWO 
VIEW INDICATION: LHC; abnormal stress test; Preoperative assessment COMPARISON: 
There are no previous relevant studies available for correlation. FINDINGS: 
Clothing artifact noted. There is mild bronchial wall thickening. There is no 
consolidation. No pneumothorax or pleural effusion. Cardiac silhouette is mildly
 enlarged. Calcified plaque thoracic aorta. Coronary arterial calcifications and
 metallic stents. The pulmonary vasculature is normal. The tracheal air shadow 
is midline. The bones appear osteopenic. Thereis a compression deformity 
thoracolumbar junction with approximately 70% loss of vertebral body height. No 
retropulsion or malalignment. Previous median sternotomy. Mediastinal clips 
compatible with prior CABG. IMPRESSION: 1. Mild cardiomegaly. Coronary arterial 
disease with prior stenting and CABG. 2. Mild bronchial wall thickening. Mild 
interstitial edema and inflammation in the differential. Otherwise, no overt 
failure at this time. 3. Osteopenia. Compression fracture thoracolumbar 
junction, age indeterminate.  SL: [id number] ** Electronically Signed by PATO Mims ** ** on t 1155 ** Reported and signed by: Armando Mims M.D. CC: Chaim Santizo MD Technologist: RT Darion(BRONSON) Trnscrd 
Date/Time/By: 2021 (3484) : By: [email or web address] Orig Print D/T: S: 2021 (1
159) PAGE 1  Signed ReportBASIC METABOLIC [id number] 11:33:00





             Test Item    Value        Reference Range Interpretation Comments

 

             SODIUM (test code = NA) 141 mEq/L    134-147      N            

 

             POTASSIUM (test code = 4.9 mEq/L    3.4-5.0      N            



             K)                                                  

 

             CHLORIDE (test code = 107 mEq/L    100-108      N            



             CL)                                                 

 

             CARBON DIOXIDE (test 29 mEq/l     21-33        N            



             code = CO2)                                         

 

             ANION GAP (test code = 10           0-20         N            



             GAP)                                                

 

             GLUCOSE (test code = 54 mg/dL            L            



             GLU)                                                

 

             BLOOD UREA NITROGEN 30 mg/dL     7-18         H            



             (test code = BUN)                                        

 

             GLOMERULAR FILTRATION 38.9         70-80        L            Units 

of measure =



             RATE (test code = GFR)                                        ml/mi

n/1.73 m2

 

             CREATININE (test code = 1.3 mg/dL    0.6-1.3      N            



             CREAT)                                              

 

             CALCIUM (test code = 9.4 mg/dL    8.0-10.5     N            



             CA)                                                 



PROTHROMBIN [id number] 11:22:00





             Test Item    Value        Reference Range Interpretation Comments

 

             PROTHROMBIN TIME 15.0 SECONDS 9.3-12.9     H            



             PATIENT (test code =                                        



             PTP)                                                

 

             INTERNATIONAL NORMAL 1.4          0.8-1.2      H             TARGET

 INR BY



             RATIO (test code =                                        INDICATIO

N Indication



             INR)                                                INR1. Prophylax

is of



                                                                 venous thrombos

is 2.0



                                                                 - 3.0 (orthoped

ic



                                                                 surgery), Proph

ylaxis



                                                                 of venous throm

bosis



                                                                 (other than hig

h-risk



                                                                 surgery), Treat

ment of



                                                                 Deep Vein



                                                                 Thrombosis/Pulm

onary



                                                                 Embolism, Preve

ntion



                                                                 of systemic emb

olism -



                                                                 Tissue heart va

lves,



                                                                 Acute Myocardia

l



                                                                 Infarction (to 

prevent



                                                                 systemic emboli

sm),



                                                                 Valvular heart



                                                                 disease, Atrial



                                                                 Fibrillation,



                                                                 Bileaflet mecha

nical



                                                                 valve in aortic



                                                                 position.2. Mec

hanical



                                                                 prosthetic valv

es



                                                                 (high risk), 2.

5 - 3.5



                                                                 Presence of Lup

us



                                                                 Anticoagulant o

r



                                                                 Antiphospholipi

d



                                                                 Antibodies, Pre

vention



                                                                 of  systemic em

bolism



                                                                 - Acute Myocard

ial



                                                                 Infarction (to 

prevent



                                                                 recurrent infar

ct).



CBC W/AUTO [id number] 11:14:00





             Test Item    Value        Reference Range Interpretation Comments

 

             WHITE BLOOD CELL (test code = 5.0 x10 3/uL 4.5-11.0     N          

  



             WBC)                                                

 

             RED BLOOD CELL (test code = 2.84 x10 6/uL 3.54-5.02    L           

 



             RBC)                                                

 

             HEMOGLOBIN (test code = HGB) 9.0 g/dL     11.0-15.0    L           

 

 

             HEMATOCRIT (test code = HCT) 29.0 %       33.0-45.0    L           

 

 

             MEAN CELL VOLUME (test code = 102.1 fL     81.0-99.0    H          

  



             MCV)                                                

 

             MEAN CELL HGB (test code = MCH) 31.7 pg      27.0-33.0    N        

    

 

             MEAN CELL HGB CONCETRATION 31.0 g/dL    33.0-37.0    L            



             (test code = MCHC)                                        

 

             RED CELL DISTRIBUTION WIDTH CV 12.1 %       11.5-14.5    N         

   



             (test code = RDW)                                        

 

             RED CELL DISTRIBUTION WIDTH SD 45.1 fL      37.0-54.0    N         

   



             (test code = RDW-SD)                                        

 

             PLATELET COUNT (test code = 100 x10 3/uL 150-400      L            



             PLT)                                                

 

             MEAN PLATELET VOLUME (test code 12.2 fL      7.0-9.0      H        

    



             = MPV)                                              

 

             NEUTROPHIL % (test code = NT%) 71.4 %       56.0-77.0    N         

   

 

             IMMATURE GRANULOCYTE % (test 0.2 %        0.0-2.0      N           

 



             code = IG%)                                         

 

             LYMPHOCYTE % (test code = LY%) 18.6 %       14.0-32.0    N         

   

 

             MONOCYTE % (test code = MO%) 7.8 %        4.8-9.0      N           

 

 

             EOSINOPHIL % (test code = EO%) 1.6 %        0.3-3.7      N         

   

 

             BASOPHIL % (test code = BA%) 0.4 %        0.0-2.0      N           

 

 

             NUCLEATED RBC % (test code = 0.0 %        0-0          N           

 



             NRBC%)                                              

 

             NEUTROPHIL # (test code = NT#) 3.57 x10 3/uL 2.0-7.6      N        

    

 

             IMMATURE GRANULOCYTE # (test 0.01 x10 3/uL 0.00-0.03    N          

  



             code = IG#)                                         

 

             LYMPHOCYTE # (test code = LY#) 0.93 x10 3/uL 1.0-3.8      L        

    

 

             MONOCYTE # (test code = MO#) 0.39 x10 3/uL 0.1-0.8      N          

  

 

             EOSINOPHIL # (test code = EO#) 0.08 x10 3/uL 0.0-0.2      N        

    

 

             BASOPHIL # (test code = BA#) 0.02 x10 3/uL 0.0-0.2      N          

  

 

             NUCLEATED RBC # (test code = 0.00 x10 3/uL 0.0-0.1      N          

  



             NRBC#)                                              

 

             MANUAL DIFF REQUIRED (test code NO                                 

    



             = MDIFF)

## 2023-01-10 NOTE — ER
Nurse's Notes                                                                                     

 [facility]                                                                 

Name: Cecilia Lancaster                                                                                  

Age: 87 yrs                                                                                       

Sex: Female                                                                                       

: 1935                                                                                   

MRN: [id number]                                                                                   

Arrival Date: 01/10/2023                                                                          

Time: 10:05                                                                                       

Account#: [id number]                                                                            

Bed 4                                                                                             

Private MD:                                                                                       

Diagnosis: Fracture of other parts of pelvis                                                      

                                                                                                  

Presentation:                                                                                     

01/10                                                                                             

10:35 Chief complaint: Patient states: Fell 22. Has had R hip/R groin pain since.       ll1 

      Trouble walking since. Coronavirus screen: Vaccine status: Patient reports receiving        

      the 2nd dose of the covid vaccine. Client denies travel out of the [place] in the last 14      

      days. At this time, the client does not indicate any symptoms associated with               

      coronavirus-19. Ebola Screen: Patient denies travel to an Ebola-affected area in the 21     

      days before illness onset. No acute neurological deficit is noted. Initial Sepsis           

      Screen: Does the patient meet any 2 criteria? No. Patient's initial sepsis screen is        

      negative. Does the patient have a suspected source of infection? Yes: Bone or joint         

      infection. Risk Assessment: Do you want to hurt yourself or someone else? Patient           

      reports no desire to harm self or others. Onset of symptoms was 2022.          

10:35 Method Of Arrival: Wheelchair                                                           ll1 

10:35 Acuity: RICK 3                                                                           ll1 

                                                                                                  

Stroke Activation: Symptom onset > 6 hours                                                        

 Physician: Stroke Attending; Name: ; Notified At: ; Arrived At:                                  

 Physician: Chief Stroke Resident; Name: ; Notified At: ; Arrived At:                             

 Physician: Stroke Resident; Name: ; Notified At: ; Arrived At:                                   

 Physician: ED Attending; Name: ; Notified At: ; Arrived At:                                      

 Physician: ED Resident; Name: ; Notified At: ; Arrived At:                                       

                                                                                                  

Historical:                                                                                       

- Allergies:                                                                                      

10:28 NKA;                                                                                    ll1 

- PMHx:                                                                                           

10:28 Depression; stage 3 renal disease; Myocardial infarction; Hypertension; Hyperlipidemia; ll1 

      Diabetes - IDDM;                                                                            

- PSHx:                                                                                           

10:28 CABG;                                                                                   ll1 

                                                                                                  

                                                                                                  

                                                                                                  

Screening:                                                                                        

10:49 [facility] ED Fall Risk Assessment (Adult) History of falling in the last 3 months,       iw  

      including since admission Yes- single mechanical fall (1 pt). Abuse screen: Denies          

      threats or abuse. Denies injuries from another. Nutritional screening: No deficits          

      noted. Tuberculosis screening: No symptoms or risk factors identified.                      

                                                                                                  

Assessment:                                                                                       

10:49 General: Appears in no apparent distress. Behavior is calm, cooperative. Pain:          iw  

      Complains of pain in right leg and left leg. Neuro: Level of Consciousness is awake,        

      alert, obeys commands, Oriented to person, place, time, situation, Moves all                

      extremities. Full function. Cardiovascular: Patient's skin is warm and dry.                 

      Respiratory: Respiratory effort is even, unlabored, Respiratory pattern is regular,         

      symmetrical. Derm: Skin is intact, is healthy with good turgor. Musculoskeletal: Range      

      of motion: intact in all extremities, Reports pain in right hip and right leg.              

                                                                                                  

Vital Signs:                                                                                      

10:35  / 62; Pulse 88; Resp 17; Temp 97.7; Pulse Ox 97% on R/A; Weight 79.38 kg; Pain   ll1 

      10/10;                                                                                      

                                                                                                  

ED Course:                                                                                        

10:05 Patient arrived in ED.                                                                  rg4 

10:17 Kalie Reddy MD is Attending Physician.                                                sp3 

10:28 Arm band placed on Patient placed in an exam room, on a stretcher.                      ll1 

10:36 Triage completed.                                                                       ll1 

10:38 Karen Farah, RN is Primary Nurse.                                                   iw  

10:49 Initial lab(s) drawn, by me, sent to lab. Inserted saline lock: 22 gauge in right       iw  

      antecubital area, using aseptic technique. Blood collected.                                 

10:57 CT Abd/Pelvis - Without Contrast In Process Unspecified.                                EDMS

12:41 Ashish Winn MD is Referral Physician.                                            sp3 

13:09 No provider procedures requiring assistance completed. IV discontinued, intact,         ss  

      bleeding controlled, No redness/swelling at site. Pressure dressing applied.                

                                                                                                  

Administered Medications:                                                                         

11:22 Drug: TORadol - (ketorolac) 15 mg Route: IVP; Site: left antecubital;                   iw  

                                                                                                  

                                                                                                  

Medication:                                                                                       

10:49 VIS not applicable for this client.                                                     iw  

                                                                                                  

Outcome:                                                                                          

12:41 Discharge ordered by MD.                                                                sp3 

13:09 Discharged to home via wheelchair, with family.                                         ss  

13:09 Condition: good                                                                             

13:09 Discharge instructions given to patient, family, Instructed on discharge instructions,      

      follow up and referral plans. medication usage, Demonstrated understanding of               

      instructions, follow-up care, medications, Prescriptions given X 1.                         

13:10 Patient left the ED.                                                                    ss  

                                                                                                  

Signatures:                                                                                       

Dispatcher [employer]                           EDMS                                                 

Karen Farah RN RN   Felipa Valentin, RN                      RN   ss                                                   

Shreya Stephens                                 rg4                                                  

Janice Martinez, RN                       RN   ll1                                                  

Kalie Reddy MD MD   sp3                                                  

                                                                                                  

**************************************************************************************************

## 2023-01-10 NOTE — RAD REPORT
EXAM DESCRIPTION:  CT - Abdomen   Pelvis Wo Contrast - 1/10/2023 10:55 am

 

CLINICAL HISTORY:  Abdominal  pain

 

COMPARISON:  December 2022

 

TECHNIQUE:  Computed axial tomography of the abdomen and pelvis was obtained. IV and oral contrast we
re not requested.

 

All CT scans are performed using dose optimization technique as appropriate and may include automated
 exposure control or mA/KV adjustment according to patient size.

 

FINDINGS:   The evaluation of solid organs, vessels and bowel is limited secondary to the lack of con
trast administration.

 

Nondisplaced fracture right inferior pubic ramus. Mildly to moderately displaced fracture superior ri
ght pubic ramus

 

No dislocation

 

Liver, spleen, pancreas, adrenals, kidneys and bladder do not demonstrate a traumatic injury.

 

Air is present within the bladder.

 

Mild edema within the anterior subcutaneous tissues. Atherosclerotic disease.

 

Varices within the upper abdomen. Marked old compression fracture L1 vertebral body

 

IMPRESSION:  Right pubic rami fractures

## 2023-01-10 NOTE — EDPHYS
Physician Documentation                                                                           

 [facility]                                                                 

Name: Cecilia Lancaster                                                                                  

Age: 87 yrs                                                                                       

Sex: Female                                                                                       

: 1935                                                                                   

MRN: [id number]                                                                                   

Arrival Date: 01/10/2023                                                                          

Time: 10:05                                                                                       

Account#: [id number]                                                                            

Bed 4                                                                                             

Private MD:                                                                                       

ED Physician Kalie Reddy                                                                         

HPI:                                                                                              

01/10                                                                                             

10:40 This 87 yrs old  Female presents to ER via Wheelchair with complaints of        sp3 

      Trouble Walking, Pain From Fall.                                                            

10:40 87-year-old female with a history of stage III renal disease, prior MI, hypertension    sp3 

      presents with right hip and right lower quadrant abdominal pain status post                 

      ground-level mechanical fall that she sustained on 2022. She has resisted coming      

      to the ED for evaluation per family and today due to her continued symptoms she was         

      finally convinced to come. She is able to ambulate but states that it hurts. No other       

      symptoms including headache, neck pain, chest pain, shortness of breath, back pain, or      

      abdominal pain, changes in bowel pattern, fever, weakness, tingling syncope, near           

      syncope, or any other symptoms on ROS at this time. No prior injury or procedures in        

      the past..                                                                                  

                                                                                                  

Historical:                                                                                       

- Allergies:                                                                                      

10:28 NKA;                                                                                    ll1 

- PMHx:                                                                                           

10:28 Depression; stage 3 renal disease; Myocardial infarction; Hypertension; Hyperlipidemia; ll1 

      Diabetes - IDDM;                                                                            

- PSHx:                                                                                           

10:28 CABG;                                                                                   ll1 

                                                                                                  

                                                                                                  

                                                                                                  

ROS:                                                                                              

10:42 Constitutional: Negative for fever, chills, and weight loss, Eyes: Negative for injury, sp3 

      pain, redness, and discharge, Neck: Negative for injury, pain, and swelling,                

      Cardiovascular: Negative for chest pain, palpitations, and edema, Respiratory: Negative     

      for shortness of breath, cough, wheezing, and pleuritic chest pain, Skin: Negative for      

      injury, rash, and discoloration, Neuro: Negative for headache, weakness, numbness,          

      tingling, and seizure.                                                                      

10:42 All other systems are negative.                                                             

                                                                                                  

Exam:                                                                                             

10:43 Constitutional:  This is a well developed, well nourished patient who is awake, alert,  sp3 

      and in no acute distress. Head/Face:  Normocephalic, atraumatic. Eyes:  Pupils equal        

      round and reactive to light, extra-ocular motions intact.  Lids and lashes normal.          

      Conjunctiva and sclera are non-icteric and not injected.  Cornea within normal limits.      

      Periorbital areas with no swelling, redness, or edema. Neck:  Trachea midline, no           

      thyromegaly or masses palpated, and no cervical lymphadenopathy.  Supple, full range of     

      motion without nuchal rigidity, or vertebral point tenderness.  No Meningismus.             

      Chest/axilla:  Normal chest wall appearance and motion.  Nontender with no deformity.       

      No lesions are appreciated. Cardiovascular:  Regular rate and rhythm with a normal S1       

      and S2.  No gallops, murmurs, or rubs.  Normal PMI, no JVD.  No pulse deficits.             

      Respiratory:  Lungs have equal breath sounds bilaterally, clear to auscultation and         

      percussion.  No rales, rhonchi or wheezes noted.  No increased work of breathing, no        

      retractions or nasal flaring. Abdomen/GI:  Soft, non-tender, with normal bowel sounds.      

      No distension or tympany.  No guarding or rebound.  No evidence of tenderness               

      throughout. Back:  No spinal tenderness.  No costovertebral tenderness.  Full range of      

      motion. Skin:  Warm, dry with normal turgor.  Normal color with no rashes, no lesions,      

      and no evidence of cellulitis.                                                              

10:43 Musculoskeletal/extremity: He has no pain on axial load of the right lower extremity.       

      Full range of motion is present but is painful in the muscular area in the right groin.     

       No swelling or other deformity, abnormality, or change in skin color noted.  Distal        

      pulses and neurovascular exam are normal.  Currently patient has no abdominal pain,         

      peritoneal signs, flank pain/CVA tenderness, back pain on the spinous processes or any      

      other abnormalities noted..                                                                 

                                                                                                  

Vital Signs:                                                                                      

10:35  / 62; Pulse 88; Resp 17; Temp 97.7; Pulse Ox 97% on R/A; Weight 79.38 kg; Pain   ll1 

      10/10;                                                                                      

                                                                                                  

MDM:                                                                                              

10:33 Patient medically screened.                                                             sp3 

10:44 Data reviewed: vital signs, nurses notes. ED course: 87-year-old female with multiple   sp3 

      medical problems now with right hip/inguinal pain secondary to fall 11 days ago.            

      Differential diagnosis includes hip contusion, inguinal muscle strain, abdominal wall       

      injury, and to a much lesser likelihood intra-abdominal injury, hip fracture, vascular      

      derangement. Vital signs are normal and I am not suspecting critical illness. Will          

      assess with laboratory values, CT scan of the abdomen and pelvis noncontrast given her      

      renal history, and we will treat with ketorolac 15 mg IV for pain control..                 

12:36 ED course: CT demonstrates superior and inferior rami fracture nondisplaced on the      sp3 

      right side without evidence of organ damage. Laboratory values are normal with              

      exception of a hemoglobin of 10. Patient does state that over the last 10 days her          

      symptoms have gotten better but just have not gone away which is why she presented to       

      the ED. Had extensive conversation with patient in joint decision-making with her           

      family acting as , we have to do stat being discharges her best option with       

      orthopedic follow-up in the office. I stressed the importance of this to have this          

      appointment to evaluate potential surgical options other likely conservative approach       

      is the best scenario. They understand that they may return here at any time if symptoms     

      worsen. I have also educated them on being nonweightbearing and laying and reclining        

      whenever possible. I have also educated them on signs and symptoms of DVT and PE and        

      importance of leg muscle squeezing exercises to prevent this. Given patient's renal         

      insufficiency, tramadol will be given for pain control. I have also educated them on        

      stool softeners and the importance of fiber in the diet to prevent constipation.            

      Orthopedic follow-up will be given and patient will be discharged..                         

                                                                                                  

01/10                                                                                             

10:38 Order name: CBC with Diff; Complete Time: 12:09                                         sp3 

01/10                                                                                             

10:38 Order name: CMP; Complete Time: 12:09                                                   sp3 

01/10                                                                                             

10:38 Order name: Urine Microscopic Only; Complete Time: 12:09                                sp3 

01/10                                                                                             

10:38 Order name: CT Abd/Pelvis - Without Contrast; Complete Time: 12:09                      sp3 

01/10                                                                                             

11:25 Order name: Urine Dipstick-Ancillary; Complete Time: 12:09                              EDMS

01/10                                                                                             

10:38 Order name: IV Saline Lock; Complete Time: 10:49                                        sp3 

01/10                                                                                             

10:38 Order name: Labs collected and sent; Complete Time: 10:49                               sp3 

01/10                                                                                             

10:38 Order name: Urine Dipstick-Ancillary (obtain specimen); Complete Time: 11:22            sp3 

                                                                                                  

Administered Medications:                                                                         

11:22 Drug: TORadol - (ketorolac) 15 mg Route: IVP; Site: left antecubital;                   iw  

                                                                                                  

                                                                                                  

Disposition Summary:                                                                              

01/10/23 12:41                                                                                    

Discharge Ordered                                                                                 

      Location: Home                                                                          sp3 

      Condition: Stable                                                                       sp3 

      Diagnosis                                                                                   

        - Fracture of other parts of pelvis                                                   sp3 

      Followup:                                                                               sp3 

        - With: Ashish Winn MD                                                              

        - When: Upon discharge from the Emergency Department                                       

        - Reason: Continuance of care                                                              

      Discharge Instructions:                                                                     

        - Discharge Summary Sheet                                                             sp3 

        - Simple Pelvic Fracture, Adult                                                       sp3 

      Forms:                                                                                      

        - Medication Reconciliation Form                                                      sp3 

        - Thank You Letter                                                                    sp3 

        - Antibiotic Education                                                                sp3 

        - Prescription Opioid Use                                                             sp3 

      Prescriptions:                                                                              

        - Tramadol 50 mg Oral Tablet                                                               

            - take 1 tablet by ORAL route every 8 hours as needed; 12 tablet; Refills: 0,     sp3 

      Product Selection Permitted                                                                 

Signatures:                                                                                       

Dispatcher [employer]                           EDMS                                                 

Karen Farah RN RN   iw                                                   

Janice Martinez RN                       RN   ll1                                                  

Kalie Reddy MD MD   sp3                                                  

                                                                                                  

Corrections: (The following items were deleted from the chart)                                    

10:46 10:44 ED course: 87-year-old female with multiple medical problems now with right       sp3 

      hip/inguinal pain secondary to fall 11 days ago. Diagnosis includes hip contusion,          

      inguinal muscle strain, abdominal wall injury, and to a much lesser likelihood              

      intra-abdominal injury, hip fracture, vascular derangement. Vital signs are normal and      

      I am not suspecting critical illness. Will assess with laboratory values, CT scan of        

      the abdomen and pelvis noncontrast given her renal history, and we will treat with          

      ketorolac 15 mg IV for pain control.. sp3                                                   

                                                                                                  

**************************************************************************************************